# Patient Record
Sex: FEMALE | Race: WHITE | Employment: PART TIME | URBAN - METROPOLITAN AREA
[De-identification: names, ages, dates, MRNs, and addresses within clinical notes are randomized per-mention and may not be internally consistent; named-entity substitution may affect disease eponyms.]

---

## 2017-11-30 ENCOUNTER — OFFICE VISIT (OUTPATIENT)
Dept: INTERNAL MEDICINE CLINIC | Age: 22
End: 2017-11-30

## 2017-11-30 VITALS
OXYGEN SATURATION: 97 % | RESPIRATION RATE: 16 BRPM | HEART RATE: 65 BPM | WEIGHT: 160.7 LBS | BODY MASS INDEX: 30.34 KG/M2 | DIASTOLIC BLOOD PRESSURE: 69 MMHG | HEIGHT: 61 IN | TEMPERATURE: 97.7 F | SYSTOLIC BLOOD PRESSURE: 104 MMHG

## 2017-11-30 DIAGNOSIS — Z13.0 SCREENING, ANEMIA, DEFICIENCY, IRON: ICD-10-CM

## 2017-11-30 DIAGNOSIS — Z30.49 ENCOUNTER FOR INITIAL MANAGEMENT OF NUVARING: ICD-10-CM

## 2017-11-30 DIAGNOSIS — Z12.4 SCREENING FOR CERVICAL CANCER: ICD-10-CM

## 2017-11-30 DIAGNOSIS — Z23 ENCOUNTER FOR IMMUNIZATION: ICD-10-CM

## 2017-11-30 DIAGNOSIS — Z11.3 SCREEN FOR STD (SEXUALLY TRANSMITTED DISEASE): ICD-10-CM

## 2017-11-30 DIAGNOSIS — Z13.29 SCREENING FOR THYROID DISORDER: ICD-10-CM

## 2017-11-30 DIAGNOSIS — Z01.419 GYNECOLOGIC EXAM NORMAL: ICD-10-CM

## 2017-11-30 DIAGNOSIS — Z13.1 SCREENING FOR DIABETES MELLITUS: ICD-10-CM

## 2017-11-30 DIAGNOSIS — Z01.419 WELL WOMAN EXAM: Primary | ICD-10-CM

## 2017-11-30 DIAGNOSIS — L70.0 ACNE VULGARIS: ICD-10-CM

## 2017-11-30 DIAGNOSIS — Z98.82 HX OF BREAST IMPLANTS, BILATERAL: ICD-10-CM

## 2017-11-30 DIAGNOSIS — Z13.220 SCREENING FOR HYPERLIPIDEMIA: ICD-10-CM

## 2017-11-30 LAB
HCG URINE, QL. (POC): NEGATIVE
VALID INTERNAL CONTROL?: YES

## 2017-11-30 RX ORDER — ETONOGESTREL AND ETHINYL ESTRADIOL 11.7; 2.7 MG/1; MG/1
INSERT, EXTENDED RELEASE VAGINAL
Qty: 9 DEVICE | Refills: 4 | Status: SHIPPED | OUTPATIENT
Start: 2017-11-30 | End: 2017-12-04 | Stop reason: ALTCHOICE

## 2017-11-30 NOTE — MR AVS SNAPSHOT
Visit Information Date & Time Provider Department Dept. Phone Encounter #  
 11/30/2017  1:45 PM Searcy Koyanagi, MD [facility] Medicine and [place] [address] [id number] Follow-up Instructions Return in about 3 months (around 2/28/2018) for weight check,. Follow-up and Disposition History Upcoming Health Maintenance Date Due  
 HPV AGE 9Y-34Y (1 of 3 - Female 3 Dose Series) 10/11/2005 PAP AKA CERVICAL CYTOLOGY 10/11/2015 Influenza Age 5 to Adult 8/1/2017 DTaP/Tdap/Td series (2 - Td) 1/29/2026 Allergies as of 11/30/2017  Review Complete On: 11/30/2017 By: Dustin Kuo No Known Allergies Current Immunizations  Reviewed on 6/9/2016 Name Date Influenza Vaccine (Quad) PF 11/30/2017 Tdap 1/29/2016 Not reviewed this visit You Were Diagnosed With   
  
 Codes Comments Well woman exam    -  Primary ICD-10-CM: B79.000 ICD-9-CM: V72.31 Encounter for immunization     ICD-10-CM: V22 ICD-9-CM: V03.89 Gynecologic exam normal     ICD-10-CM: R54.181 ICD-9-CM: V72.31 Hx of breast implants, bilateral     ICD-10-CM: H87.38 
ICD-9-CM: V43.82 Screening, anemia, deficiency, iron     ICD-10-CM: Z13.0 ICD-9-CM: V78.0 Screening for diabetes mellitus     ICD-10-CM: Z13.1 ICD-9-CM: V77.1 Screening for thyroid disorder     ICD-10-CM: Z13.29 ICD-9-CM: V77.0 Screening for hyperlipidemia     ICD-10-CM: Z13.220 ICD-9-CM: V77.91 Encounter for initial management of nuvaring     ICD-10-CM: Z30.49 ICD-9-CM: V25.02 Acne vulgaris     ICD-10-CM: L70.0 ICD-9-CM: 706.1 Vitals BP Pulse Temp Resp Height(growth percentile) Weight(growth percentile) 104/69 (BP 1 Location: Left arm, BP Patient Position: Sitting) 65 97.7 °F (36.5 °C) (Oral) 16 5' 1\" (1.549 m) 160 lb 11.2 oz (72.9 kg) LMP SpO2 BMI OB Status Smoking Status 11/10/2017 (Approximate) 97% 30.36 kg/m2 Having regular periods Never Smoker BMI and BSA Data Body Mass Index Body Surface Area  
 30.36 kg/m 2 1.77 m 2 Preferred Pharmacy Pharmacy Name Phone [id number] [place] - [address] [phone] Your Updated Medication List  
  
   
This list is accurate as of: 11/30/17  2:35 PM.  Always use your most recent med list.  
  
  
  
  
 ethinyl estradiol-etonogestrel 0.12-0.015 mg/24 hr vaginal ring Commonly known as:  Aaron Danjuan Per instructions Prescriptions Sent to Pharmacy Refills  
 ethinyl estradiol-etonogestrel (NUVARING) 0.12-0.015 mg/24 hr vaginal ring 4 Sig: Per instructions Class: Normal  
 Pharmacy: [facility] [address] [facility] [address]  #: [phone] We Performed the Following AMB POC URINE PREGNANCY TEST, VISUAL COLOR COMPARISON [49484 CPT(R)] CBC WITH AUTOMATED DIFF [04413 CPT(R)] INFLUENZA VIRUS VAC QUAD,SPLIT,PRESV FREE SYRINGE IM [id number] CPT(R)] LIPID PANEL [92919 CPT(R)] METABOLIC PANEL, COMPREHENSIVE [01607 CPT(R)] MO COLLECTION VENOUS BLOOD,VENIPUNCTURE [id number] CPT(R)] MO IMMUNIZ ADMIN,1 SINGLE/COMB VAC/TOXOID [id number] CPT(R)] REFERRAL TO DERMATOLOGY [REF19 Custom] TSH 3RD GENERATION [87155 CPT(R)] VITAMIN D, 25 HYDROXY [id number] CPT(R)] Follow-up Instructions Return in about 3 months (around 2/28/2018) for weight check,. Referral Information Referral ID Referred By Referred To  
  
 [id number] [address] [facility], [place] [address] Visits Status Start Date End Date 1 New Request 11/30/17 11/30/18 If your referral has a status of pending review or denied, additional information will be sent to support the outcome of this decision. Introducing [facility] & HEALTH SERVICES!    
 Kimi Bean introduces [employer] patient portal. Now you can access parts of your medical record, email your doctor's office, and request medication refills online. 1. In your internet browser, go to [email or web address] pte 2. Click on the First Time User? Click Here link in the Sign In box. You will see the New Member Sign Up page. 3. Enter your [employer] Access Code exactly as it appears below. You will not need to use this code after youve completed the sign-up process. If you do not sign up before the expiration date, you must request a new code. · [employer] Access Code: [email or web address] Expires: 2/28/2018  2:35 PM 
 
4. Enter the last four digits of your Social Security Number (xxxx) and Date of Birth (mm/dd/yyyy) as indicated and click Submit. You will be taken to the next sign-up page. 5. Create a [employer] ID. This will be your [employer] login ID and cannot be changed, so think of one that is secure and easy to remember. 6. Create a [employer] password. You can change your password at any time. 7. Enter your Password Reset Question and Answer. This can be used at a later time if you forget your password. 8. Enter your e-mail address. You will receive e-mail notification when new information is available in [address] 9. Click Sign Up. You can now view and download portions of your medical record. 10. Click the Download Summary menu link to download a portable copy of your medical information. If you have questions, please visit the Frequently Asked Questions section of the [employer] website. Remember, [employer] is NOT to be used for urgent needs. For medical emergencies, dial 911. Now available from your iPhone and Android! Please provide this summary of care documentation to your next provider. Your primary care clinician is listed as Tylor Mendez. If you have any questions after today's visit, please call [phone].

## 2017-11-30 NOTE — PROGRESS NOTES
SUBJECTIVE:   20 y.o. female for annual routine Pap and checkup. Current Outpatient Prescriptions   Medication Sig Dispense Refill    ethinyl estradiol-etonogestrel (NUVARING) 0.12-0.015 mg/24 hr vaginal ring Per instructions 9 Device 4     Allergies: Review of patient's allergies indicates no known allergies. Patient's last menstrual period was 11/10/2017 (approximate). ROS:  Feeling well. No dyspnea or chest pain on exertion. No abdominal pain, change in bowel habits, black or bloody stools. No urinary tract symptoms. GYN ROS: normal menses, no abnormal bleeding, pelvic pain or discharge, no breast pain or new or enlarging lumps on self exam. No neurological complaints. OBJECTIVE:   The patient appears well, alert, oriented x 3, in no distress. Visit Vitals    /69 (BP 1 Location: Left arm, BP Patient Position: Sitting)    Pulse 65    Temp 97.7 °F (36.5 °C) (Oral)    Resp 16    Ht 5' 1\" (1.549 m)    Wt 160 lb 11.2 oz (72.9 kg)    LMP 11/10/2017 (Approximate)    SpO2 97%    BMI 30.36 kg/m2     ENT normal.  Neck supple. No adenopathy or thyromegaly. JAMEY. Lungs are clear, good air entry, no wheezes, rhonchi or rales. S1 and S2 normal, no murmurs, regular rate and rhythm. Abdomen soft without tenderness, guarding, mass or organomegaly. Extremities show no edema, normal peripheral pulses. Neurological is normal, no focal findings. BREAST EXAM: breasts appear normal, no suspicious masses, no skin or nipple changes or axillary nodes    PELVIC EXAM: normal external genitalia, vulva, vagina, cervix, uterus and adnexa    ASSESSMENT:   Normal exam  Overweight, on plan, hx of bulemia. Better body image now. well woman  no contraindication to begin hormonal therapy    PLAN:   pap smear  counseled on breast self exam, use and side effects of OCP's and family planning choices  return annually or prn    ICD-10-CM ICD-9-CM    1.  Well woman exam Z01.419 V72.31 ethinyl estradiol-etonogestrel (NUVARING) 0.12-0.015 mg/24 hr vaginal ring      CBC WITH AUTOMATED DIFF      LIPID PANEL      METABOLIC PANEL, COMPREHENSIVE      RI COLLECTION VENOUS BLOOD,VENIPUNCTURE      TSH 3RD GENERATION      VITAMIN D, 25 HYDROXY   2. Encounter for immunization Z23 V03.89 RI IMMUNIZ ADMIN,1 SINGLE/COMB VAC/TOXOID      INFLUENZA VIRUS VAC QUAD,SPLIT,PRESV FREE SYRINGE IM   3. Gynecologic exam normal Z01.419 V72.31    4. Hx of breast implants, bilateral Z98.82 V43.82    5. Screening, anemia, deficiency, iron Z13.0 V78.0 CBC WITH AUTOMATED DIFF   6. Screening for diabetes mellitus Z13.1 V77.1 LIPID PANEL   7. Screening for thyroid disorder Z13.29 V77.0 TSH 3RD GENERATION   8. Screening for hyperlipidemia Z13.220 V77.91 LIPID PANEL   9.  Encounter for initial management of nuvaring Z30.49 V25.02 ethinyl estradiol-etonogestrel (NUVARING) 0.12-0.015 mg/24 hr vaginal ring     Follow-up Disposition: Not on Marine MD Mark

## 2017-11-30 NOTE — PROGRESS NOTES
Chief Complaint   Patient presents with    Well Woman     rm 4 with pap and breast exam, patient would like birth control and acne medication      1. Have you been to the ER, urgent care clinic since your last visit? Hospitalized since your last visit? No    2. Have you seen or consulted any other health care providers outside of the [address] [place] [address] since your last visit? Include any pap smears or colon screening.  No

## 2017-12-01 LAB
25(OH)D3+25(OH)D2 SERPL-MCNC: 42.3 NG/ML (ref 30–100)
ALBUMIN SERPL-MCNC: 4.1 G/DL (ref 3.5–5.5)
ALBUMIN/GLOB SERPL: 1.6 {RATIO} (ref 1.2–2.2)
ALP SERPL-CCNC: 56 IU/L (ref 39–117)
ALT SERPL-CCNC: 12 IU/L (ref 0–32)
AST SERPL-CCNC: 17 IU/L (ref 0–40)
BASOPHILS # BLD AUTO: 0 X10E3/UL (ref 0–0.2)
BASOPHILS NFR BLD AUTO: 0 %
BILIRUB SERPL-MCNC: 0.4 MG/DL (ref 0–1.2)
BUN SERPL-MCNC: 14 MG/DL (ref 6–20)
BUN/CREAT SERPL: 17 (ref 9–23)
CALCIUM SERPL-MCNC: 9 MG/DL (ref 8.7–10.2)
CHLORIDE SERPL-SCNC: 102 MMOL/L (ref 96–106)
CHOLEST SERPL-MCNC: 117 MG/DL (ref 100–199)
CO2 SERPL-SCNC: 27 MMOL/L (ref 18–29)
CREAT SERPL-MCNC: 0.81 MG/DL (ref 0.57–1)
EOSINOPHIL # BLD AUTO: 0.1 X10E3/UL (ref 0–0.4)
EOSINOPHIL NFR BLD AUTO: 1 %
ERYTHROCYTE [DISTWIDTH] IN BLOOD BY AUTOMATED COUNT: 13 % (ref 12.3–15.4)
GFR SERPLBLD CREATININE-BSD FMLA CKD-EPI: 103 ML/MIN/1.73
GFR SERPLBLD CREATININE-BSD FMLA CKD-EPI: 118 ML/MIN/1.73
GLOBULIN SER CALC-MCNC: 2.6 G/DL (ref 1.5–4.5)
GLUCOSE SERPL-MCNC: 82 MG/DL (ref 65–99)
HCT VFR BLD AUTO: 39.2 % (ref 34–46.6)
HDLC SERPL-MCNC: 55 MG/DL
HGB BLD-MCNC: 12.9 G/DL (ref 11.1–15.9)
IMM GRANULOCYTES # BLD: 0 X10E3/UL (ref 0–0.1)
IMM GRANULOCYTES NFR BLD: 0 %
LDLC SERPL CALC-MCNC: 46 MG/DL (ref 0–99)
LYMPHOCYTES # BLD AUTO: 2.3 X10E3/UL (ref 0.7–3.1)
LYMPHOCYTES NFR BLD AUTO: 29 %
MCH RBC QN AUTO: 29.3 PG (ref 26.6–33)
MCHC RBC AUTO-ENTMCNC: 32.9 G/DL (ref 31.5–35.7)
MCV RBC AUTO: 89 FL (ref 79–97)
MONOCYTES # BLD AUTO: 0.6 X10E3/UL (ref 0.1–0.9)
MONOCYTES NFR BLD AUTO: 7 %
NEUTROPHILS # BLD AUTO: 5.1 X10E3/UL (ref 1.4–7)
NEUTROPHILS NFR BLD AUTO: 63 %
PLATELET # BLD AUTO: 272 X10E3/UL (ref 150–379)
POTASSIUM SERPL-SCNC: 4.1 MMOL/L (ref 3.5–5.2)
PROT SERPL-MCNC: 6.7 G/DL (ref 6–8.5)
RBC # BLD AUTO: 4.41 X10E6/UL (ref 3.77–5.28)
SODIUM SERPL-SCNC: 142 MMOL/L (ref 134–144)
TRIGL SERPL-MCNC: 81 MG/DL (ref 0–149)
TSH SERPL DL<=0.005 MIU/L-ACNC: 0.86 UIU/ML (ref 0.45–4.5)
VLDLC SERPL CALC-MCNC: 16 MG/DL (ref 5–40)
WBC # BLD AUTO: 8.1 X10E3/UL (ref 3.4–10.8)

## 2017-12-04 ENCOUNTER — OFFICE VISIT (OUTPATIENT)
Dept: INTERNAL MEDICINE CLINIC | Age: 22
End: 2017-12-04

## 2017-12-04 VITALS
RESPIRATION RATE: 18 BRPM | DIASTOLIC BLOOD PRESSURE: 78 MMHG | TEMPERATURE: 97.8 F | SYSTOLIC BLOOD PRESSURE: 114 MMHG | WEIGHT: 161.1 LBS | HEART RATE: 86 BPM | HEIGHT: 61 IN | BODY MASS INDEX: 30.41 KG/M2 | OXYGEN SATURATION: 98 %

## 2017-12-04 DIAGNOSIS — R14.0 BLOATING SYMPTOM: Primary | ICD-10-CM

## 2017-12-04 DIAGNOSIS — Z91.018 FOOD ALLERGY: ICD-10-CM

## 2017-12-04 DIAGNOSIS — Z30.09 FAMILY PLANNING INITIATION: ICD-10-CM

## 2017-12-04 NOTE — PROGRESS NOTES
Chief Complaint   Patient presents with    Allergies     rm 5 patient would like to have allergy testing specifically gluten, has been working out and has not seen any change      1. Have you been to the ER, urgent care clinic since your last visit? Hospitalized since your last visit? No    2. Have you seen or consulted any other health care providers outside of the [address] [place] [address] since your last visit? Include any pap smears or colon screening.  No

## 2017-12-04 NOTE — PROGRESS NOTES
Ms. Otoniel Bright is a 20y.o. year old female who had concerns including Allergies. HPI:  Chief Complaint   Patient presents with    Allergies     rm 5 patient would like to have allergy testing specifically gluten, has been working out and has not seen any change       Trouble with weight. Feels bloated and constipated, uncomfortable eating. nuvaring was $72 instead of free. Past Medical History:   Diagnosis Date    Anxiety     Borderline personality disorder     Degenerative disc disease at L5-S1 level     Depression      Current Outpatient Prescriptions   Medication Sig Dispense    [START ON 12/9/2017] norelgestromin-ethinyl estradiol (ORTHO EVRA) 150-35 mcg/24 hr 1 Patch by TransDERmal route Every Saturday. 12 Patch     No current facility-administered medications for this visit. Reviewed PmHx, RxHx, FmHx, SocHx, AllgHx and updated and dated in the chart. ROS: Negative except for BOLD  General: fever, chills, fatigue  Respiratory: cough, SOB, wheezing  Cardiovascular:  CP, palpitation, SAHU, edema   Gastrointestinal: N/V/D, bleeding  Genito-Urinary: dysuria, hematuria  Musculoskeletal: muscle weakness, pain, swelling    OBJECTIVE:   Visit Vitals    /78 (BP 1 Location: Right arm, BP Patient Position: Sitting)    Pulse 86    Temp 97.8 °F (36.6 °C) (Oral)    Resp 18    Ht 5' 1\" (1.549 m)    Wt 161 lb 1.6 oz (73.1 kg)    LMP 11/10/2017 (Approximate)    SpO2 98%    BMI 30.44 kg/m2     GEN: The patient appears well, alert, oriented x 3, in no distress. Lungs: clear bilaterally, good air entry, no wheezes, rhonchi or rales. Cardiovascular: regular rate and rhythm. S1 and S2 normal, no murmurs,  Abdomen: + BS, soft without tenderness, guarding, rebound, mass or organomegaly.        Results for orders placed or performed in visit on 11/30/17   CBC WITH AUTOMATED DIFF   Result Value Ref Range    WBC 8.1 3.4 - 10.8 x10E3/uL    RBC 4.41 3.77 - 5.28 x10E6/uL    HGB 12.9 11.1 - 15.9 g/dL    HCT 39.2 34.0 - 46.6 %    MCV 89 79 - 97 fL    MCH 29.3 26.6 - 33.0 pg    MCHC 32.9 31.5 - 35.7 g/dL    RDW 13.0 12.3 - 15.4 %    PLATELET 887 250 - 794 x10E3/uL    NEUTROPHILS 63 Not Estab. %    Lymphocytes 29 Not Estab. %    MONOCYTES 7 Not Estab. %    EOSINOPHILS 1 Not Estab. %    BASOPHILS 0 Not Estab. %    ABS. NEUTROPHILS 5.1 1.4 - 7.0 x10E3/uL    Abs Lymphocytes 2.3 0.7 - 3.1 x10E3/uL    ABS. MONOCYTES 0.6 0.1 - 0.9 x10E3/uL    ABS. EOSINOPHILS 0.1 0.0 - 0.4 x10E3/uL    ABS. BASOPHILS 0.0 0.0 - 0.2 x10E3/uL    IMMATURE GRANULOCYTES 0 Not Estab. %    ABS. IMM. GRANS. 0.0 0.0 - 0.1 x10E3/uL   LIPID PANEL   Result Value Ref Range    Cholesterol, total 117 100 - 199 mg/dL    Triglyceride 81 0 - 149 mg/dL    HDL Cholesterol 55 >39 mg/dL    VLDL, calculated 16 5 - 40 mg/dL    LDL, calculated 46 0 - 99 mg/dL   METABOLIC PANEL, COMPREHENSIVE   Result Value Ref Range    Glucose 82 65 - 99 mg/dL    BUN 14 6 - 20 mg/dL    Creatinine 0.81 0.57 - 1.00 mg/dL    GFR est non- >59 mL/min/1.73    GFR est  >59 mL/min/1.73    BUN/Creatinine ratio 17 9 - 23    Sodium 142 134 - 144 mmol/L    Potassium 4.1 3.5 - 5.2 mmol/L    Chloride 102 96 - 106 mmol/L    CO2 27 18 - 29 mmol/L    Calcium 9.0 8.7 - 10.2 mg/dL    Protein, total 6.7 6.0 - 8.5 g/dL    Albumin 4.1 3.5 - 5.5 g/dL    GLOBULIN, TOTAL 2.6 1.5 - 4.5 g/dL    A-G Ratio 1.6 1.2 - 2.2    Bilirubin, total 0.4 0.0 - 1.2 mg/dL    Alk.  phosphatase 56 39 - 117 IU/L    AST (SGOT) 17 0 - 40 IU/L    ALT (SGPT) 12 0 - 32 IU/L   TSH 3RD GENERATION   Result Value Ref Range    TSH 0.864 0.450 - 4.500 uIU/mL   VITAMIN D, 25 HYDROXY   Result Value Ref Range    VITAMIN D, 25-HYDROXY 42.3 30.0 - 100.0 ng/mL   AMB POC URINE PREGNANCY TEST, VISUAL COLOR COMPARISON   Result Value Ref Range    VALID INTERNAL CONTROL POC Yes     HCG urine, Ql. (POC) Negative Negative         Assessment/ Plan:       ICD-10-CM ICD-9-CM    1. Bloating symptom R14.0 787.3 ALLERGEN PANEL, FOOD, BASIC      ALLERGEN, GLUTEN AB, IGE, QT   2. Food allergy Z91.018 V15.05 ALLERGEN PANEL, FOOD, BASIC      ALLERGEN, GLUTEN AB, IGE, QT   3. Family planning initiation Z30.09 V25.09 norelgestromin-ethinyl estradiol (ORTHO EVRA) 150-35 mcg/24 hr       Screen for food allergies    I have discussed the diagnosis with the patient and the intended plan as seen in the above orders. The patient has received an after-visit summary and questions were answered concerning future plans. Medication Side Effects and Warnings were discussed with patient.     Follow-up Disposition: Not on R Radha Eisenberg MD

## 2017-12-04 NOTE — MR AVS SNAPSHOT
Visit Information Date & Time Provider Department Dept. Phone Encounter #  
 12/4/2017  3:00 PM Rita Cabello MD [facility] Medicine and [place] [address] [id number] Your Appointments 3/5/2018  1:30 PM  
Any with Rita Cabello MD  
[address] [place] [address] and Primary Care [facility]) Appt Note: 3 month f/u  
 [address] [facility]  
  
   
 [phone] Upcoming Health Maintenance Date Due  
 HPV AGE 9Y-34Y (1 of 3 - Female 3 Dose Series) 10/11/2005 PAP AKA CERVICAL CYTOLOGY 3/4/2018* DTaP/Tdap/Td series (2 - Td) 1/29/2026 *Topic was postponed. The date shown is not the original due date. Allergies as of 12/4/2017  Review Complete On: 12/4/2017 By: Rita Cabello MD  
 No Known Allergies Current Immunizations  Reviewed on 6/9/2016 Name Date Influenza Vaccine (Quad) PF 11/30/2017 Tdap 1/29/2016 Not reviewed this visit You Were Diagnosed With   
  
 Codes Comments Bloating symptom    -  Primary ICD-10-CM: R14.0 ICD-9-CM: 806. 3 Food allergy     ICD-10-CM: U70.810 
ICD-9-CM: V15.05 Family planning initiation     ICD-10-CM: Z30.09 
ICD-9-CM: V25.09 Vitals BP Pulse Temp Resp Height(growth percentile) Weight(growth percentile) 114/78 (BP 1 Location: Right arm, BP Patient Position: Sitting) 86 97.8 °F (36.6 °C) (Oral) 18 5' 1\" (1.549 m) 161 lb 1.6 oz (73.1 kg) LMP SpO2 BMI OB Status Smoking Status 11/10/2017 (Approximate) 98% 30.44 kg/m2 Having regular periods Never Smoker BMI and BSA Data Body Mass Index Body Surface Area  
 30.44 kg/m 2 1.77 m 2 Preferred Pharmacy Pharmacy Name Phone [address] [facility] [phone] Your Updated Medication List  
  
   
 This list is accurate as of: 17  4:13 PM.  Always use your most recent med list.  
  
  
  
  
 norelgestromin-ethinyl estradiol 150-35 mcg/24 hr  
Commonly known as:  ORTHO EVRA  
1 Patch by TransDERmal route Every Saturday. Start taking on:  2017 Prescriptions Sent to Pharmacy Refills  
 norelgestromin-ethinyl estradiol (ORTHO EVRA) 150-35 mcg/24 hr 4 Starting on: 2017 Si Patch by TransDERmal route Every Saturday. Class: Normal  
 Pharmacy: [address] [place] [address] RD AT [address] [facility] #: [phone] Route: TransDERmal  
  
We Performed the Following ALLERGEN PANEL, FOOD, BASIC [id number] CPT(R)] ALLERGEN, GLUTEN AB, IGE, QT [[id number] Custom] Patient Instructions Learning About Birth Control: The Patch What is the patch? The patch is used to prevent pregnancy. It looks like a bandage and is put on the skin of your belly, rear end (buttocks), upper arm, or upper body (but not on a breast). The patch releases a regular dose of the hormones estrogen and progestin. These hormones prevent pregnancy in three ways. They thicken the mucus in the cervix. This makes it hard for sperm to travel into the uterus. They thin the lining of the uterus, which makes it harder for a fertilized egg to attach to the uterus. The hormones also can stop the ovaries from releasing an egg each month (ovulation). The patch provides birth control for 1 month at a time. You change the patch once a week for 3 weeks and then go without a patch for 1 week. During this week, you have your period. Your period may be very light. How well does it work? In the first year of use: · When the patch is used exactly as directed, fewer than 1 woman out of 100 has an unplanned pregnancy. · When the patch is not used exactly as directed, 9 women out of 100 have an unplanned pregnancy. Be sure to tell your doctor about any health problems you have or medicines you take. He or she can help you choose the birth control method that is right for you. What are the advantages of the patch? · The patch is more effective for preventing pregnancy than barrier methods of birth control, such as the condom or diaphragm. · It may reduce acne, heavy bleeding and cramping, and symptoms of premenstrual syndrome. · It's convenient. You put it on only 3 times each month. You do not have to interrupt sex to protect against pregnancy. · It's easy to check to see if you forgot to put one on. What are the disadvantages of the patch? · The patch doesn't protect against sexually transmitted infections (STIs), such as herpes or HIV/AIDS. If you aren't sure if your sex partner might have an STI, use a condom to protect against disease. · The patch may cause changes in your period. You may have little bleeding, skipped periods, or spotting. · It may cause mood changes, less interest in sex, or weight gain. · The patch contains estrogen. It may not be right for you if you have certain health problems or concerns. · It may increase your risk of blood clots. · It may be less effective in women who are overweight. · You must remember to change the patch on schedule. Where can you learn more? Go to http://maureen-diana.info/. Enter C094 in the search box to learn more about \"Learning About Birth Control: The Patch. \" 
Current as of: March 16, 2017 Content Version: 11.4 © 8809-5079 Healthwise, Incorporated. Care instructions adapted under license by [employer] (which disclaims liability or warranty for this information). If you have questions about a medical condition or this instruction, always ask your healthcare professional. [address] any warranty or liability for your use of this information. Introducing hospitals & HEALTH SERVICES! [employer] introduces [employer] patient portal. Now you can access parts of your medical record, email your doctor's office, and request medication refills online. 1. In your internet browser, go to [email or web address] 2. Click on the First Time User? Click Here link in the Sign In box. You will see the New Member Sign Up page. 3. Enter your [employer] Access Code exactly as it appears below. You will not need to use this code after youve completed the sign-up process. If you do not sign up before the expiration date, you must request a new code. · [employer] Access Code: [email or web address] Expires: 2/28/2018  2:35 PM 
 
4. Enter the last four digits of your Social Security Number (xxxx) and Date of Birth (mm/dd/yyyy) as indicated and click Submit. You will be taken to the next sign-up page. 5. Create a [employer] ID. This will be your [employer] login ID and cannot be changed, so think of one that is secure and easy to remember. 6. Create a [employer] password. You can change your password at any time. 7. Enter your Password Reset Question and Answer. This can be used at a later time if you forget your password. 8. Enter your e-mail address. You will receive e-mail notification when new information is available in [address] 9. Click Sign Up. You can now view and download portions of your medical record. 10. Click the Download Summary menu link to download a portable copy of your medical information. If you have questions, please visit the Frequently Asked Questions section of the [employer] website. Remember, [employer] is NOT to be used for urgent needs. For medical emergencies, dial 911. Now available from your iPhone and Android! Please provide this summary of care documentation to your next provider. Your primary care clinician is listed as Michelle Soni. If you have any questions after today's visit, please call [phone].

## 2017-12-04 NOTE — PATIENT INSTRUCTIONS
Learning About Birth Control: The Patch  What is the patch? The patch is used to prevent pregnancy. It looks like a bandage and is put on the skin of your belly, rear end (buttocks), upper arm, or upper body (but not on a breast). The patch releases a regular dose of the hormones estrogen and progestin. These hormones prevent pregnancy in three ways. They thicken the mucus in the cervix. This makes it hard for sperm to travel into the uterus. They thin the lining of the uterus, which makes it harder for a fertilized egg to attach to the uterus. The hormones also can stop the ovaries from releasing an egg each month (ovulation). The patch provides birth control for 1 month at a time. You change the patch once a week for 3 weeks and then go without a patch for 1 week. During this week, you have your period. Your period may be very light. How well does it work? In the first year of use:  · When the patch is used exactly as directed, fewer than 1 woman out of 100 has an unplanned pregnancy. · When the patch is not used exactly as directed, 9 women out of 100 have an unplanned pregnancy. Be sure to tell your doctor about any health problems you have or medicines you take. He or she can help you choose the birth control method that is right for you. What are the advantages of the patch? · The patch is more effective for preventing pregnancy than barrier methods of birth control, such as the condom or diaphragm. · It may reduce acne, heavy bleeding and cramping, and symptoms of premenstrual syndrome. · It's convenient. You put it on only 3 times each month. You do not have to interrupt sex to protect against pregnancy. · It's easy to check to see if you forgot to put one on. What are the disadvantages of the patch? · The patch doesn't protect against sexually transmitted infections (STIs), such as herpes or HIV/AIDS.  If you aren't sure if your sex partner might have an STI, use a condom to protect against disease. · The patch may cause changes in your period. You may have little bleeding, skipped periods, or spotting. · It may cause mood changes, less interest in sex, or weight gain. · The patch contains estrogen. It may not be right for you if you have certain health problems or concerns. · It may increase your risk of blood clots. · It may be less effective in women who are overweight. · You must remember to change the patch on schedule. Where can you learn more? Go to http://maureen-diana.info/. Enter W982 in the search box to learn more about \"Learning About Birth Control: The Patch. \"  Current as of: March 16, 2017  Content Version: 11.4  © 4793-9242 [employer]. Care instructions adapted under license by [employer] (which disclaims liability or warranty for this information). If you have questions about a medical condition or this instruction, always ask your healthcare professional. [place] [address] any warranty or liability for your use of this information.

## 2017-12-05 LAB
A VAGINAE DNA VAG QL NAA+PROBE: NORMAL SCORE
BVAB2 DNA VAG QL NAA+PROBE: NORMAL SCORE
C ALBICANS DNA VAG QL NAA+PROBE: NEGATIVE
C GLABRATA DNA VAG QL NAA+PROBE: NEGATIVE
C TRACH RRNA SPEC QL NAA+PROBE: NEGATIVE
MEGA1 DNA VAG QL NAA+PROBE: NORMAL SCORE
N GONORRHOEA RRNA SPEC QL NAA+PROBE: NEGATIVE
T VAGINALIS RRNA SPEC QL NAA+PROBE: NEGATIVE

## 2017-12-07 LAB
CODFISH IGE QN: <0.1 KU/L
COW MILK IGE QN: <0.1 KU/L
CYTOLOGIST CVX/VAG CYTO: NORMAL
CYTOLOGY CVX/VAG DOC THIN PREP: NORMAL
DX ICD CODE: NORMAL
EGG WHITE IGE QN: <0.1 KU/L
GLUTEN IGE QN: <0.1 KU/L
LABCORP, 190119: NORMAL
Lab: NORMAL
OTHER STN SPEC: NORMAL
PATH REPORT.FINAL DX SPEC: NORMAL
PEANUT IGE QN: <0.1 KU/L
SOYBEAN IGE QN: <0.1 KU/L
STAT OF ADQ CVX/VAG CYTO-IMP: NORMAL
WHEAT IGE QN: <0.1 KU/L

## 2018-04-24 ENCOUNTER — OFFICE VISIT (OUTPATIENT)
Dept: INTERNAL MEDICINE CLINIC | Age: 23
End: 2018-04-24

## 2018-04-24 VITALS
HEIGHT: 61 IN | TEMPERATURE: 97.9 F | WEIGHT: 164.6 LBS | HEART RATE: 86 BPM | OXYGEN SATURATION: 97 % | DIASTOLIC BLOOD PRESSURE: 70 MMHG | BODY MASS INDEX: 31.08 KG/M2 | RESPIRATION RATE: 16 BRPM | SYSTOLIC BLOOD PRESSURE: 100 MMHG

## 2018-04-24 DIAGNOSIS — E28.2 PCOS (POLYCYSTIC OVARIAN SYNDROME): Primary | ICD-10-CM

## 2018-04-24 DIAGNOSIS — F60.3 BORDERLINE PERSONALITY DISORDER (HCC): ICD-10-CM

## 2018-04-24 RX ORDER — VENLAFAXINE 25 MG/1
TABLET ORAL
COMMUNITY
End: 2018-04-24

## 2018-04-24 RX ORDER — VENLAFAXINE HYDROCHLORIDE 37.5 MG/1
37.5 CAPSULE, EXTENDED RELEASE ORAL DAILY
Qty: 30 CAP | Refills: 0 | Status: SHIPPED | OUTPATIENT
Start: 2018-04-24 | End: 2019-04-29 | Stop reason: ALTCHOICE

## 2018-04-24 RX ORDER — NICOTINE POLACRILEX 2 MG
GUM BUCCAL
Status: ON HOLD | COMMUNITY
End: 2019-02-11 | Stop reason: CLARIF

## 2018-04-24 RX ORDER — METFORMIN HYDROCHLORIDE 500 MG/1
500 TABLET ORAL 2 TIMES DAILY WITH MEALS
Qty: 60 TAB | Refills: 1 | Status: ON HOLD | OUTPATIENT
Start: 2018-04-24 | End: 2019-02-11 | Stop reason: CLARIF

## 2018-04-24 NOTE — MR AVS SNAPSHOT
[address] [address] 
[phone] Patient: Jada Lui MRN: [id number] :1995 Visit Information Date & Time Provider Department Dept. Phone Encounter #  
 2018  8:00 AM Daria Johnson, [address] [facility] Internal Medicine [phone] [id number] Upcoming Health Maintenance Date Due  
 HPV Age 9Y-34Y (1 of 3 - Female 3 Dose Series) 10/11/2005 PAP AKA CERVICAL CYTOLOGY 2020 DTaP/Tdap/Td series (2 - Td) 2026 Allergies as of 2018  Review Complete On: 2018 By: Daria Johnson MD  
 No Known Allergies Current Immunizations  Reviewed on 2016 Name Date Influenza Vaccine (Quad) PF 2017 Tdap 2016 Not reviewed this visit You Were Diagnosed With   
  
 Codes Comments PCOS (polycystic ovarian syndrome)    -  Primary ICD-10-CM: E28.2 ICD-9-CM: 256.4 Borderline personality disorder     ICD-10-CM: F60.3 ICD-9-CM: 480.74 Vitals BP Pulse Temp Resp Height(growth percentile) Weight(growth percentile) 100/70 (BP 1 Location: Left arm, BP Patient Position: Sitting) 86 97.9 °F (36.6 °C) (Oral) 16 5' 1\" (1.549 m) 164 lb 9.6 oz (74.7 kg) LMP SpO2 BMI OB Status Smoking Status 04/15/2018 (Approximate) 97% 31.1 kg/m2 Having regular periods Never Smoker BMI and BSA Data Body Mass Index Body Surface Area  
 31.1 kg/m 2 1.79 m 2 Preferred Pharmacy Pharmacy Name Phone [address] [place] [address] [phone] Your Updated Medication List  
  
   
This list is accurate as of 18  9:04 AM.  Always use your most recent med list.  
  
  
  
  
 ADULT MULTIVITAMIN GUMMIES 200 mcg Chew Generic drug:  multivit with min-folic acid Take  by mouth.  
  
 biotin 1 mg Cap Take  by mouth.  
  
 metFORMIN 500 mg tablet Commonly known as:  GLUCOPHAGE  
 Take 1 Tab by mouth two (2) times daily (with meals). norelgestromin-ethinyl estradiol 150-35 mcg/24 hr  
Commonly known as:  ORTHO EVRA  
1 Patch by TransDERmal route Every Saturday. venlafaxine-SR 37.5 mg capsule Commonly known as:  EFFEXOR-XR Take 1 Cap by mouth daily. Prescriptions Sent to Pharmacy Refills  
 metFORMIN (GLUCOPHAGE) 500 mg tablet 1 Sig: Take 1 Tab by mouth two (2) times daily (with meals). Class: Normal  
 Pharmacy: [address] [place] [address], [address] [place] [address] Ph #: [phone] Route: Oral  
 venlafaxine-SR (EFFEXOR-XR) 37.5 mg capsule 0 Sig: Take 1 Cap by mouth daily. Class: Normal  
 Pharmacy: [address] [place] [address], [address] [place] [address] Ph #: [phone] Route: Oral  
  
Patient Instructions Polycystic Ovary Syndrome: Care Instructions Your Care Instructions Polycystic ovary syndrome, or PCOS, means a woman's hormones are out of balance. It can cause problems with your periods and make it hard to get pregnant. Doctors don't know for sure what causes PCOS, but it seems to run in families. It also seems to be linked to obesity and a risk for diabetes. If you have PCOS, your sisters and daughters have a higher chance of getting it too. You may have other symptoms. These include weight gain, acne, too much hair growth on the face or body, high blood pressure, and high blood sugar. Your ovaries may have cysts on them. These cysts are growths filled with fluid. Keep in mind that although you may not have regular periods, you can still get pregnant. Talk to your doctor about birth control if you do not want to get pregnant. Sometimes the hormone changes with PCOS can also make it hard for some women to get pregnant. If this is a concern, talk to your doctor about treatment for this problem. Women who have PCOS can go for months or longer with no period.  Your doctor may recommend medicines that can help get your cycles back to normal. 
Follow-up care is a key part of your treatment and safety. Be sure to make and go to all appointments, and call your doctor if you are having problems. It's also a good idea to know your test results and keep a list of the medicines you take. How can you care for yourself at home? · Take your medicines exactly as prescribed. Call your doctor if you think you are having a problem with your medicine. · Eat a healthy diet. Include fruits, vegetables, beans, and whole grains in your diet each day. · If you are overweight, losing weight can help with many of the symptoms of PCOS. Talk to your doctor about safe ways to lose weight. · Get at least 30 minutes of exercise on most days of the week. Walking is a good choice. Or you can run, swim, cycle, or play tennis or team sports. · For hair growth you don't want, try bleaching, plucking, electrolysis, or laser therapy. · Acne can be treated with over-the-counter medicines. Look for ones that have benzoyl peroxide or salicylic acid in them. When should you call for help? Call your doctor now or seek immediate medical care if: 
? · You have severe vaginal bleeding. ? · You have new or worse belly or pelvic pain. ? Watch closely for changes in your health, and be sure to contact your doctor if: 
? · You do not get better as expected. ? · You have unusual vaginal bleeding. Where can you learn more? Go to http://maureen-diana.info/. Enter R858 in the search box to learn more about \"Polycystic Ovary Syndrome: Care Instructions. \" Current as of: October 13, 2016 Content Version: 11.4 © 7059-5602 [employer]. Care instructions adapted under license by [employer] (which disclaims liability or warranty for this information).  If you have questions about a medical condition or this instruction, always ask your healthcare professional. [place] disclaims any warranty or liability for your use of this information. Introducing [facility] & HEALTH [facility]! Dear Nely Reading: Thank you for requesting a inEarth account. Our records indicate that you already have an active inEarth account. You can access your account anytime at [email or web address] Did you know that you can access your hospital and ER discharge instructions at any time in inEarth? You can also review all of your test results from your hospital stay or ER visit. Additional Information If you have questions, please visit the Frequently Asked Questions section of the inEarth website at [email or web address] Remember, inEarth is NOT to be used for urgent needs. For medical emergencies, dial 911. Now available from your iPhone and Android! Please provide this summary of care documentation to your next provider. Your primary care clinician is listed as [employer] If you have any questions after today's visit, please call [phone].

## 2018-04-24 NOTE — PROGRESS NOTES
Chief Complaint   Patient presents with    PCOS    Other     medication discussion     Patient states she is here for PCOS and would like to be started on Metformin because she is not ovulating.

## 2018-04-24 NOTE — PROGRESS NOTES
HPI:  Chris Bustamante is a 20y.o. year old female who is a new patient and is here to establish care. She was previous followed by Dr Anita King and most recently had lab work and evaluation at another clinic and will request records. The following sections were reviewed & updated as appropriate: PMH, PL, PSH, and SH. Will request records. Active medical concerns are as follows: She is on BCP and last menses 4/15/18. He states menses are regular on her birth control pill. She is requesting metformin for PCOS. She states recent lab testing showed elevated testosterone and she has acne and excess facial hair growth. She states she waxes her face to remove the facial hair. Her DHEA and 17 hydroxy were normal and testosterone was elevated she is also concerned about her weight and would like metformin to help her with weight loss. On review of recent lab work. She is working out 5x a week and is eating a healthy diet and has not felt able to lose wt. She has been on oral contraceptive since age 14 yr. She states she also has a borderline personality disorder. She just restarted her effexor xr that she had left over from when she was taking it previously a year ago. She had stopped it a year ago. She recently lost her job and felt she needed the Effexor XR but denies depression or anxiety this time. She is seeing a psychologist at [facility]. She also notes Hx of ovarian cysts as a teenager. Current Outpatient Prescriptions   Medication Sig Dispense Refill    venlafaxine (EFFEXOR) 25 mg tablet Take  by mouth.  multivit with min-folic acid (ADULT MULTIVITAMIN GUMMIES) 200 mcg chew Take  by mouth.  biotin 1 mg cap Take  by mouth.        No Known Allergies  Past Medical History:   Diagnosis Date    Anxiety     Borderline personality disorder     Degenerative disc disease at L5-S1 level     Depression      Past Surgical History:   Procedure Laterality Date    HX BREAST AUGMENTATION       Family History   Problem Relation Age of Onset    Cancer Mother      skin,thyroid,ovarian   [employer] Mother     Depression Mother     Anxiety Mother     Bipolar Disorder Mother     Other Father      prediabetes    No Known Problems Sister     Other Brother      jayne     Social History   Substance Use Topics    Smoking status: Never Smoker    Smokeless tobacco: Never Used    Alcohol use No      Comment: socially             Review of Systems   Constitutional: Negative for chills, fever and malaise/fatigue. HENT: Negative for congestion and sore throat. Respiratory: Negative for shortness of breath. Cardiovascular: Negative for chest pain and leg swelling. Gastrointestinal: Negative for abdominal pain. Physical Exam   Constitutional: She appears well-developed. No distress. /70 (BP 1 Location: Left arm, BP Patient Position: Sitting)  Pulse 86  Temp 97.9 °F (36.6 °C) (Oral)   Resp 16  Ht 5' 1\" (1.549 m)  Wt 164 lb 9.6 oz (74.7 kg)  LMP 04/15/2018 (Approximate)  SpO2 97%  BMI 31.1 kg/m2   HENT:   Head: Normocephalic and atraumatic. Nose: Nose normal.   Eyes: Conjunctivae and EOM are normal. Pupils are equal, round, and reactive to light. Neck: No thyromegaly present. Cardiovascular: Normal rate, regular rhythm, normal heart sounds and intact distal pulses. No murmur heard. Pulmonary/Chest: Effort normal and breath sounds normal. She has no wheezes. Abdominal: Soft. Bowel sounds are normal. She exhibits no distension and no mass. There is no tenderness. Musculoskeletal: She exhibits no edema. Skin:   Acne on face. No significant facial hair today   Psychiatric: She has a normal mood and affect. Vitals reviewed. Assessment & Plan:    ICD-10-CM ICD-9-CM    1. PCOS (polycystic ovarian syndrome)  After reviewing lab work and history it is likely that she has PCO S. Will try metformin 500 mg twice daily which may also help with her obesity.    E28.2 256.4    2. Borderline personality disorder  She will continue seeing her counselor. Will restart a low-dose of Effexor XR 37.5 mg daily as she feels this is helped her personality disorder in the past and she states she is unable to see psychiatry due to loss of her insurance. She is counseled regarding side effects of medication and not to discontinue it abruptly and risk of withdrawal side effects. She is counseled if she develops any depression or suicidal thoughts to contact me or go to the emergency room immediately. F60.3 301.83    3. Obesity:     Counseled regarding elevated BMI and current weight goals. Reviewed weight loss strategies including dietary changes and exercise. Follow-up Disposition:  Return in about 4 weeks (around 5/22/2018). Advised her to call back or return to office if symptoms worsen/change/persist.  Discussed expected course/resolution/complications of diagnosis in detail with patient. Medication risks/benefits/costs/interactions/alternatives discussed with patient. She was given an after visit summary which includes diagnoses, current medications, & vitals. She expressed understanding with the diagnosis and plan.

## 2018-04-24 NOTE — PATIENT INSTRUCTIONS
Polycystic Ovary Syndrome: Care Instructions  Your Care Instructions  Polycystic ovary syndrome, or PCOS, means a woman's hormones are out of balance. It can cause problems with your periods and make it hard to get pregnant. Doctors don't know for sure what causes PCOS, but it seems to run in families. It also seems to be linked to obesity and a risk for diabetes. If you have PCOS, your sisters and daughters have a higher chance of getting it too. You may have other symptoms. These include weight gain, acne, too much hair growth on the face or body, high blood pressure, and high blood sugar. Your ovaries may have cysts on them. These cysts are growths filled with fluid. Keep in mind that although you may not have regular periods, you can still get pregnant. Talk to your doctor about birth control if you do not want to get pregnant. Sometimes the hormone changes with PCOS can also make it hard for some women to get pregnant. If this is a concern, talk to your doctor about treatment for this problem. Women who have PCOS can go for months or longer with no period. Your doctor may recommend medicines that can help get your cycles back to normal.  Follow-up care is a key part of your treatment and safety. Be sure to make and go to all appointments, and call your doctor if you are having problems. It's also a good idea to know your test results and keep a list of the medicines you take. How can you care for yourself at home? · Take your medicines exactly as prescribed. Call your doctor if you think you are having a problem with your medicine. · Eat a healthy diet. Include fruits, vegetables, beans, and whole grains in your diet each day. · If you are overweight, losing weight can help with many of the symptoms of PCOS. Talk to your doctor about safe ways to lose weight. · Get at least 30 minutes of exercise on most days of the week. Walking is a good choice.  Or you can run, swim, cycle, or play tennis or team sports. · For hair growth you don't want, try bleaching, plucking, electrolysis, or laser therapy. · Acne can be treated with over-the-counter medicines. Look for ones that have benzoyl peroxide or salicylic acid in them. When should you call for help? Call your doctor now or seek immediate medical care if:  ? · You have severe vaginal bleeding. ? · You have new or worse belly or pelvic pain. ? Watch closely for changes in your health, and be sure to contact your doctor if:  ? · You do not get better as expected. ? · You have unusual vaginal bleeding. Where can you learn more? Go to http://maureen-diana.info/. Enter J766 in the search box to learn more about \"Polycystic Ovary Syndrome: Care Instructions. \"  Current as of: October 13, 2016  Content Version: 11.4  © 2551-5405 [employer]. Care instructions adapted under license by [employer] (which disclaims liability or warranty for this information). If you have questions about a medical condition or this instruction, always ask your healthcare professional. [address] any warranty or liability for your use of this information.

## 2018-04-25 PROBLEM — E28.2 PCOS (POLYCYSTIC OVARIAN SYNDROME): Status: ACTIVE | Noted: 2018-04-25

## 2018-05-14 ENCOUNTER — TELEPHONE (OUTPATIENT)
Dept: INTERNAL MEDICINE CLINIC | Age: 23
End: 2018-05-14

## 2018-07-31 LAB
CHLAMYDIA, EXTERNAL: NEGATIVE
N. GONORRHEA, EXTERNAL: NEGATIVE

## 2018-08-15 LAB
ANTIBODY SCREEN, EXTERNAL: NEGATIVE
HBSAG, EXTERNAL: NEGATIVE
HCT, EXTERNAL: 40
HGB EVAL, EXTERNAL: NORMAL
HGB, EXTERNAL: 13.4
HIV, EXTERNAL: NON REACTIVE
PLATELET CNT,   EXTERNAL: 207
RPR, EXTERNAL: NON REACTIVE
RUBELLA, EXTERNAL: NORMAL
TYPE, ABO & RH, EXTERNAL: NORMAL

## 2018-09-05 LAB — AFPT, MATERNAL, EXTERNAL: NEGATIVE

## 2018-10-26 ENCOUNTER — OFFICE VISIT (OUTPATIENT)
Dept: OBGYN CLINIC | Age: 23
End: 2018-10-26

## 2018-10-26 VITALS
HEIGHT: 61 IN | WEIGHT: 192 LBS | DIASTOLIC BLOOD PRESSURE: 76 MMHG | SYSTOLIC BLOOD PRESSURE: 114 MMHG | BODY MASS INDEX: 36.25 KG/M2

## 2018-10-26 DIAGNOSIS — Z34.90 PREGNANCY, UNSPECIFIED GESTATIONAL AGE: ICD-10-CM
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
## 2018-10-26 NOTE — PATIENT INSTRUCTIONS
Weeks 22 to 26 of Your Pregnancy: Care Instructions Your Care Instructions As you enter your 7th month of pregnancy at week 26, your baby's lungs are growing stronger and getting ready to breathe. You may notice that your baby responds to the sound of your or your partner's voice. You may also notice that your baby does less turning and twisting and more squirming or jerking. Jerking often means that your baby has the hiccups. Hiccups are perfectly normal and are only temporary. You may want to think about attending a childbirth preparation class. This is also a good time to start thinking about whether you want to have pain medicine during labor. Most pregnant women are tested for gestational diabetes between weeks 25 and 28. Gestational diabetes occurs when your blood sugar level gets too high when you're pregnant. The test is important, because you can have gestational diabetes and not know it. But the condition can cause problems for your baby. Follow-up care is a key part of your treatment and safety. Be sure to make and go to all appointments, and call your doctor if you are having problems. It's also a good idea to know your test results and keep a list of the medicines you take. How can you care for yourself at home? Ease discomfort from your baby's kicking · Change your position. Sometimes this will cause your baby to change position too. · Take a deep breath while you raise your arm over your head. Then breathe out while you drop your arm. Do Kegel exercises to prevent urine from leaking · You can do Kegel exercises while you stand or sit. ? Squeeze the same muscles you would use to stop your urine. Your belly and thighs should not move. ? Hold the squeeze for 3 seconds, and then relax for 3 seconds. ? Start with 3 seconds. Then add 1 second each week until you are able to squeeze for 10 seconds. ? Repeat the exercise 10 to 15 times for each session.  Do three or more sessions each day. Ease or reduce swelling in your feet, ankles, hands, and fingers · If your fingers are puffy, take off your rings. · Do not eat high-salt foods, such as potato chips. · Prop up your feet on a stool or couch as much as possible. Sleep with pillows under your feet. · Do not stand for long periods of time or wear tight shoes. · Wear support stockings. Where can you learn more? Go to http://maureen-diana.info/. Enter G264 in the search box to learn more about \"Weeks 22 to 26 of Your Pregnancy: Care Instructions. \" Current as of: November 21, 2017 Content Version: 11.8 © 7306-1298 Healthwise, Incorporated. Care instructions adapted under license by [employer] (which disclaims liability or warranty for this information). If you have questions about a medical condition or this instruction, always ask your healthcare professional. [address] any warranty or liability for your use of this information.

## 2018-11-01 ENCOUNTER — OFFICE VISIT (OUTPATIENT)
Dept: INTERNAL MEDICINE CLINIC | Age: 23
End: 2018-11-01

## 2018-11-01 VITALS
TEMPERATURE: 98.5 F | HEART RATE: 108 BPM | DIASTOLIC BLOOD PRESSURE: 76 MMHG | BODY MASS INDEX: 36.38 KG/M2 | OXYGEN SATURATION: 98 % | SYSTOLIC BLOOD PRESSURE: 108 MMHG | WEIGHT: 192.7 LBS | HEIGHT: 61 IN | RESPIRATION RATE: 16 BRPM

## 2018-11-01 DIAGNOSIS — Z23 ENCOUNTER FOR IMMUNIZATION: ICD-10-CM

## 2018-11-01 DIAGNOSIS — Z34.90 PREGNANCY, UNSPECIFIED GESTATIONAL AGE: Primary | ICD-10-CM

## 2018-11-01 PROBLEM — E66.01 SEVERE OBESITY (HCC): Status: ACTIVE | Noted: 2018-11-01

## 2018-11-01 NOTE — PROGRESS NOTES
Chief Complaint Patient presents with  Routine Prenatal Visit  
 
she is a 24y.o. year old female who presents for evaluation of needing a referral for obgyn. Patient's insurance is requiring a referral for ob. She has been to several of her gynecologist.  Starting off with [facility], then [facility] and currently with [facility]. She would like to stick with them and needs a referral currently. Denies any current nausea vomiting and/or pain. Reviewed and agree with Nurse Note and duplicated in this note. Reviewed PmHx, RxHx, FmHx, SocHx, AllgHx and updated and dated in the chart. Family History Problem Relation Age of Onset  Cancer Mother   
     skin,thyroid,ovarian  Migraines Mother  Depression Mother  Anxiety Mother  Bipolar Disorder Mother  Other Father   
     prediabetes  Heart Disease Father  Hypertension Father  No Known Problems Sister  Other Brother   
     esbergers  Diabetes Paternal Uncle Past Medical History:  
Diagnosis Date  Anxiety  Borderline personality disorder ([facility] [address]  Degenerative disc disease at L5-S1 level  Depression  PCOS (polycystic ovarian syndrome) Social History Socioeconomic History  Marital status:  Spouse name: Not on file  Number of children: Not on file  Years of education: Not on file  Highest education level: Not on file Social Needs  Financial resource strain: Not on file  Food insecurity - worry: Not on file  Food insecurity - inability: Not on file  Transportation needs - medical: Not on file  Transportation needs - non-medical: Not on file Occupational History  Not on file Tobacco Use  Smoking status: Never Smoker  Smokeless tobacco: Never Used Substance and Sexual Activity  Alcohol use: No  
  Alcohol/week: 0.0 oz  
  Comment: socially  Drug use: Yes Types: Marijuana  Sexual activity: Yes  
  Partners: Male Birth control/protection: None Other Topics Concern  Not on file Social History Narrative Partner - Nicholas Carroll Review of Systems - negative except as listed above Objective:  
 
Vitals:  
 11/01/18 1547 BP: 108/76 Pulse: (!) 108 Resp: 16 Temp: 98.5 °F (36.9 °C) TempSrc: Oral  
SpO2: 98% Weight: 192 lb 11.2 oz (87.4 kg) Height: 5' 1\" (1.549 m) Physical Examination: General appearance - alert, well appearing, and in no distress Eyes - pupils equal and reactive, extraocular eye movements intact Ears - bilateral TM's and external ear canals normal 
Nose - normal and patent, no erythema, discharge or polyps Mouth - mucous membranes moist, pharynx normal without lesions Neck - supple, no significant adenopathy Chest - clear to auscultation, no wheezes, rales or rhonchi, symmetric air entry Heart - normal rate, regular rhythm, normal S1, S2, no murmurs, rubs, clicks or gallops Abdomen - soft, nontender, nondistended, no masses or organomegaly Musculoskeletal - no joint tenderness, deformity or swelling Extremities - peripheral pulses normal, no pedal edema, no clubbing or cyanosis Skin - normal coloration and turgor, no rashes, no suspicious skin lesions noted Assessment/ Plan:  
Diagnoses and all orders for this visit: 1. Pregnancy, unspecified gestational age 2. Encounter for immunization 
-     INFLUENZA VIRUS VAC QUAD,SPLIT,PRESV FREE SYRINGE IM 
-     CO IMMUNIZ ADMIN,1 SINGLE/COMB VAC/TOXOID Follow-up Disposition: Not on File I have discussed the diagnosis with the patient and the intended plan as seen in the above orders. The patient has received an after-visit summary and questions were answered concerning future plans. Medication Side Effects and Warnings were discussed with patient, Patient Labs were reviewed and or requested, and 
Patient Past Records were reviewed and or requested  yes Pt agrees to call or return to clinic and/or go to closest ER with any worsening of symptoms. This may include, but not limited to increased fever (>100.4) with NSAIDS or Tylenol, increased edema, confusion, rash, worsening of presenting symptoms.

## 2018-11-21 ENCOUNTER — ROUTINE PRENATAL (OUTPATIENT)
Dept: OBGYN CLINIC | Age: 23
End: 2018-11-21

## 2018-11-21 VITALS — DIASTOLIC BLOOD PRESSURE: 72 MMHG | SYSTOLIC BLOOD PRESSURE: 112 MMHG | WEIGHT: 197.4 LBS | BODY MASS INDEX: 37.3 KG/M2

## 2018-11-21 DIAGNOSIS — Z23 ENCOUNTER FOR IMMUNIZATION: ICD-10-CM

## 2018-11-21 DIAGNOSIS — Z3A.28 28 WEEKS GESTATION OF PREGNANCY: Primary | ICD-10-CM

## 2018-11-21 LAB
GTT, 1 HR, GLUCOLA, EXTERNAL: 87
HCT, EXTERNAL: 33
HGB, EXTERNAL: 10.7
HIV, EXTERNAL: NON REACTIVE
PLATELET CNT,   EXTERNAL: 258
T. PALLIDUM, EXTERNAL: NEGATIVE

## 2018-11-21 NOTE — PROGRESS NOTES
LIMITED OB SCAN- TRANSFER PATIENT  A SINGLE VERTEX 28W4D IUP IS SEEN. FETAL CARDIAC MOTION OBSERVED. LIMITED ANATOMY WAS VISUALIZED AND APPEARS WNL. APPROPRIATE GROWTH 53%ile MEASURED. SIZE = DATES. TENNILLE AND PLACENTA APPEAR WITHIN NORMAL LIMITS. Tdap vaccine, Lot [id number], Exp 4/3/21, given IM in right deltoid without complication per MD order. Consent signed. Pt with some epigastric discomfort after eating large meals. Takes Tums prn for GERD. Feels like she is running out of space. Advised small frequent meals, avoidance of spicy greasy foods, with Pepcid prn. Otherwise doing well. Good Fm, no LOF, no VB.      Tdap and 28 wk labs today    Records again requested from [facility]    RTC: 2 wks

## 2018-11-21 NOTE — PATIENT INSTRUCTIONS

## 2018-11-23 LAB
ERYTHROCYTE [DISTWIDTH] IN BLOOD BY AUTOMATED COUNT: 13 % (ref 12.3–15.4)
GLUCOSE 1H P 50 G GLC PO SERPL-MCNC: 87 MG/DL (ref 65–129)
HCT VFR BLD AUTO: 33 % (ref 34–46.6)
HGB BLD-MCNC: 10.7 G/DL (ref 11.1–15.9)
HIV 1+2 AB+HIV1 P24 AG SERPL QL IA: NON REACTIVE
MCH RBC QN AUTO: 28.1 PG (ref 26.6–33)
MCHC RBC AUTO-ENTMCNC: 32.4 G/DL (ref 31.5–35.7)
MCV RBC AUTO: 87 FL (ref 79–97)
PLATELET # BLD AUTO: 258 X10E3/UL (ref 150–379)
RBC # BLD AUTO: 3.81 X10E6/UL (ref 3.77–5.28)
T PALLIDUM AB SER QL IA: NEGATIVE
WBC # BLD AUTO: 9.9 X10E3/UL (ref 3.4–10.8)

## 2018-11-23 NOTE — PROGRESS NOTES
kellie 86    28 wk labs wnl with exception of mild anemia, Hgb 10.7. Please inform patient, advise iron rich diet, and iron supplementation with ferrous sulfate 325mg daily. Thanks!

## 2018-11-25 ENCOUNTER — TELEPHONE (OUTPATIENT)
Dept: INTERNAL MEDICINE CLINIC | Age: 23
End: 2018-11-25

## 2018-11-25 RX ORDER — OSELTAMIVIR PHOSPHATE 75 MG/1
75 CAPSULE ORAL 2 TIMES DAILY
Qty: 10 CAP | Refills: 0 | Status: SHIPPED | OUTPATIENT
Start: 2018-11-25 | End: 2018-11-30

## 2018-11-25 NOTE — TELEPHONE ENCOUNTER
Patients calls with her  diagnosed with influenza A today. Both she and he have had vaccinations. She is 7 months pregnant. She has fever to 99.9 as well as aches and cough with clear sputum. I advised her to call her OB and check on the ability to use tamiflu. I also reviewed \"Up to Date\" and the consensus is that benefit out weighs risk of the treatment with tamiflu. She called me back and the OB concurred. RX called in and she will use tylenol as needed for fever.

## 2018-12-05 ENCOUNTER — ROUTINE PRENATAL (OUTPATIENT)
Dept: OBGYN CLINIC | Age: 23
End: 2018-12-05

## 2018-12-05 VITALS — BODY MASS INDEX: 37.64 KG/M2 | SYSTOLIC BLOOD PRESSURE: 122 MMHG | DIASTOLIC BLOOD PRESSURE: 78 MMHG | WEIGHT: 199.2 LBS

## 2018-12-05 DIAGNOSIS — Z3A.30 30 WEEKS GESTATION OF PREGNANCY: Primary | ICD-10-CM

## 2018-12-05 NOTE — PATIENT INSTRUCTIONS

## 2018-12-05 NOTE — PROGRESS NOTES
Flu last week. Feeling better now. Good fetal movement. Denies vaginal bleeding, leaking fluid, contractions, swelling. Mood is stable off meds, open to counseling and medication postpartum  Started pregnancy classes. Reviewed 28 wk labs - wnl.     RTC: 2 weeks

## 2018-12-19 ENCOUNTER — ROUTINE PRENATAL (OUTPATIENT)
Dept: OBGYN CLINIC | Age: 23
End: 2018-12-19

## 2018-12-19 VITALS — BODY MASS INDEX: 38.36 KG/M2 | SYSTOLIC BLOOD PRESSURE: 112 MMHG | WEIGHT: 203 LBS | DIASTOLIC BLOOD PRESSURE: 72 MMHG

## 2018-12-19 DIAGNOSIS — Z3A.32 32 WEEKS GESTATION OF PREGNANCY: Primary | ICD-10-CM

## 2018-12-19 DIAGNOSIS — O36.8130 DECREASED FETAL MOVEMENTS IN THIRD TRIMESTER, SINGLE OR UNSPECIFIED FETUS: ICD-10-CM

## 2018-12-19 NOTE — PROGRESS NOTES
Doing well.    No ctx, no LOF, no VB  Reports decreased fetal movement, significantly less movement for the last 1-2 weeks, reports she only feels baby move 3-4 times per day --> NST/BPP today  Reviewed birth plan, discussed doulas          ADDENDUM:  NST: cat I tracing  Indication: decreased FM  FHTs: baseline 130s, moderate variability, +accels, no decels  [place]: no ctx  Time: 20 min    Yunier Navarro MD  12/19/2018  10:58 AM

## 2018-12-19 NOTE — PATIENT INSTRUCTIONS
Weeks 32 to 34 of Your Pregnancy: Care Instructions  Your Care Instructions    During the last few weeks of your pregnancy, you may have more aches and pains. It's important to rest when you can. Your growing baby is putting more pressure on your bladder. So you may need to urinate more often. Hemorrhoids are also common. These are painful, itchy veins in the rectal area. In the 36th week, most women have a test for group B streptococcus (GBS). GBS is a common bacteria that can live in the vagina and rectum. It can make your baby sick after birth. If you test positive, you will get antibiotics during labor. These will keep your baby from getting the bacteria. You may want to talk with your doctor about banking your baby's umbilical cord blood. This is the blood left in the cord after birth. If you want to save this blood, you must arrange it ahead of time. You can't decide at the last minute. If you haven't already had the Tdap shot during this pregnancy, talk to your doctor about getting it. It will help protect your  against pertussis infection. Follow-up care is a key part of your treatment and safety. Be sure to make and go to all appointments, and call your doctor if you are having problems. It's also a good idea to know your test results and keep a list of the medicines you take. How can you care for yourself at home? Ease hemorrhoids  · Get more liquids, fruits, vegetables, and fiber in your diet. This will help keep your stools soft. · Avoid sitting for too long. Lie on your left side several times a day. · Clean yourself with soft, moist toilet paper. Or you can use witch hazel pads or personal hygiene pads. · If you are uncomfortable, try ice packs. Or you can sit in a warm sitz bath. Do these for 20 minutes at a time, as needed. · Use hydrocortisone cream for pain and itching. Two examples are Anusol and Preparation H Hydrocortisone.   · Ask your doctor about taking an over-the-counter stool softener. Consider breastfeeding  · Experts recommend that women breastfeed for 1 year or longer. Breast milk is the perfect food for babies. · Breast milk is easier for babies to digest than formula. And it is always available, just the right temperature, and free. · Breast milk may help protect your child from some health problems.  babies are less likely than formula-fed babies to:  ? Get ear infections, colds, diarrhea, and pneumonia. ? Be obese or get diabetes later in life. · Women who breastfeed have less bleeding after the birth. Their uteruses also shrink back faster. · Some women who breastfeed lose weight faster. Making milk burns calories. · Breastfeeding can lower your risk of breast cancer, ovarian cancer, and osteoporosis. Decide about circumcision for boys  · As you make this decision, it may help to think about your personal, Christian, and family traditions. You get to decide if you will keep your son's penis natural or if he will be circumcised. · If you decide that you would like to have your baby circumcised, talk with your doctor. You can share your concerns about pain. And you can discuss your preferences for anesthesia. Where can you learn more? Go to http://maureen-diana.info/. Enter S105 in the search box to learn more about \"Weeks 32 to 34 of Your Pregnancy: Care Instructions. \"  Current as of: November 21, 2017  Content Version: 11.8  © 1538-2049 Healthwise, Incorporated. Care instructions adapted under license by [employer] (which disclaims liability or warranty for this information). If you have questions about a medical condition or this instruction, always ask your healthcare professional. [place] [address] any warranty or liability for your use of this information.

## 2019-01-02 ENCOUNTER — ROUTINE PRENATAL (OUTPATIENT)
Dept: OBGYN CLINIC | Age: 24
End: 2019-01-02

## 2019-01-02 VITALS — SYSTOLIC BLOOD PRESSURE: 110 MMHG | BODY MASS INDEX: 38.66 KG/M2 | WEIGHT: 204.6 LBS | DIASTOLIC BLOOD PRESSURE: 72 MMHG

## 2019-01-02 DIAGNOSIS — Z34.90 PREGNANCY, UNSPECIFIED GESTATIONAL AGE: ICD-10-CM

## 2019-01-02 NOTE — PATIENT INSTRUCTIONS
Weeks 34 to 36 of Your Pregnancy: Care Instructions  Your Care Instructions    By now, your baby and your belly have grown quite large. It is almost time to give birth. A full-term pregnancy can deliver between 37 and 42 weeks. Your baby's lungs are almost ready to breathe air. The bones in your baby's head are now firm enough to protect it, but soft enough to move down through the birth canal.  You may feel excited, happy, anxious, or scared. You may wonder how you will know if you are in labor or what to expect during labor. Try to be flexible in your expectations of the birth. Because each birth is different, there is no way to know exactly what childbirth will be like for you. This care sheet will help you know what to expect and how to prepare. This may make your childbirth easier. If you haven't already had the Tdap shot during this pregnancy, talk to your doctor about getting it. It will help protect your  against pertussis infection. In the 36th week, most women have a test for group B streptococcus (GBS). GBS is a common bacteria that can live in the vagina and rectum. It can make your baby sick after birth. If you test positive, you will get antibiotics during labor. The medicine will keep your baby from getting the bacteria. Follow-up care is a key part of your treatment and safety. Be sure to make and go to all appointments, and call your doctor if you are having problems. It's also a good idea to know your test results and keep a list of the medicines you take. How can you care for yourself at home? Learn about pain relief choices  · Pain is different for every woman. Talk with your doctor about your feelings about pain. · You can choose from several types of pain relief. These include medicine or breathing techniques, as well as comfort measures. You can use more than one option. · If you choose to have pain medicine during labor, talk to your doctor about your options.  Some medicines lower anxiety and help with some of the pain. Others make your lower body numb so that you won't feel pain. · Be sure to tell your doctor about your pain medicine choice before you start labor or very early in your labor. You may be able to change your mind as labor progresses. · Rarely, a woman is put to sleep by medicine given through a mask or an IV. Labor and delivery  · The first stage of labor has three parts: early, active, and transition. ? Most women have early labor at home. You can stay busy or rest, eat light snacks, drink clear fluids, and start counting contractions. ? When talking during a contraction gets hard, you may be moving to active labor. During active labor, you should head for the hospital if you are not there already. ? You are in active labor when contractions come every 3 to 4 minutes and last about 60 seconds. Your cervix is opening more rapidly. ? If your water breaks, contractions will come faster and stronger. ? During transition, your cervix is stretching, and contractions are coming more rapidly. ? You may want to push, but your cervix might not be ready. Your doctor will tell you when to push. · The second stage starts when your cervix is completely opened and you are ready to push. ? Contractions are very strong to push the baby down the birth canal.  ? You will feel the urge to push. You may feel like you need to have a bowel movement. ? You may be coached to push with contractions. These contractions will be very strong, but you will not have them as often. You can get a little rest between contractions. ? You may be emotional and irritable. You may not be aware of what is going on around you.  ? One last push, and your baby is born. · The third stage is when a few more contractions push out the placenta. This may take 30 minutes or less. · The fourth stage is the welcome recovery. You may feel overwhelmed with emotions and exhausted but alert.  This is a good time to start breastfeeding. Where can you learn more? Go to http://maureen-diana.info/. Enter M041 in the search box to learn more about \"Weeks 34 to 36 of Your Pregnancy: Care Instructions. \"  Current as of: November 21, 2017  Content Version: 11.8  © 2107-3470 Healthwise, [employer]. Care instructions adapted under license by [employer] (which disclaims liability or warranty for this information). If you have questions about a medical condition or this instruction, always ask your healthcare professional. [address] any warranty or liability for your use of this information.

## 2019-01-02 NOTE — PROGRESS NOTES
Doing well. Good fetal movement. No LOF, VB, or ctx. No other concerns today.   Discussed plans for depression management postpartum, will consider restarting SSRI in immediate postpartum period, but currently pt reports mood has overall been very good    RTC: 2 weeks --> GBS and check at that visit

## 2019-01-16 ENCOUNTER — ROUTINE PRENATAL (OUTPATIENT)
Dept: OBGYN CLINIC | Age: 24
End: 2019-01-16

## 2019-01-16 VITALS — WEIGHT: 202.6 LBS | DIASTOLIC BLOOD PRESSURE: 74 MMHG | SYSTOLIC BLOOD PRESSURE: 112 MMHG | BODY MASS INDEX: 38.28 KG/M2

## 2019-01-16 DIAGNOSIS — Z34.90 PREGNANCY, UNSPECIFIED GESTATIONAL AGE: Primary | ICD-10-CM

## 2019-01-16 LAB — GRBS, EXTERNAL: NEGATIVE

## 2019-01-16 NOTE — PATIENT INSTRUCTIONS
Weeks 34 to 36 of Your Pregnancy: Care Instructions  Your Care Instructions    By now, your baby and your belly have grown quite large. It is almost time to give birth. A full-term pregnancy can deliver between 37 and 42 weeks. Your baby's lungs are almost ready to breathe air. The bones in your baby's head are now firm enough to protect it, but soft enough to move down through the birth canal.  You may feel excited, happy, anxious, or scared. You may wonder how you will know if you are in labor or what to expect during labor. Try to be flexible in your expectations of the birth. Because each birth is different, there is no way to know exactly what childbirth will be like for you. This care sheet will help you know what to expect and how to prepare. This may make your childbirth easier. If you haven't already had the Tdap shot during this pregnancy, talk to your doctor about getting it. It will help protect your  against pertussis infection. In the 36th week, most women have a test for group B streptococcus (GBS). GBS is a common bacteria that can live in the vagina and rectum. It can make your baby sick after birth. If you test positive, you will get antibiotics during labor. The medicine will keep your baby from getting the bacteria. Follow-up care is a key part of your treatment and safety. Be sure to make and go to all appointments, and call your doctor if you are having problems. It's also a good idea to know your test results and keep a list of the medicines you take. How can you care for yourself at home? Learn about pain relief choices  · Pain is different for every woman. Talk with your doctor about your feelings about pain. · You can choose from several types of pain relief. These include medicine or breathing techniques, as well as comfort measures. You can use more than one option. · If you choose to have pain medicine during labor, talk to your doctor about your options.  Some medicines lower anxiety and help with some of the pain. Others make your lower body numb so that you won't feel pain. · Be sure to tell your doctor about your pain medicine choice before you start labor or very early in your labor. You may be able to change your mind as labor progresses. · Rarely, a woman is put to sleep by medicine given through a mask or an IV. Labor and delivery  · The first stage of labor has three parts: early, active, and transition. ? Most women have early labor at home. You can stay busy or rest, eat light snacks, drink clear fluids, and start counting contractions. ? When talking during a contraction gets hard, you may be moving to active labor. During active labor, you should head for the hospital if you are not there already. ? You are in active labor when contractions come every 3 to 4 minutes and last about 60 seconds. Your cervix is opening more rapidly. ? If your water breaks, contractions will come faster and stronger. ? During transition, your cervix is stretching, and contractions are coming more rapidly. ? You may want to push, but your cervix might not be ready. Your doctor will tell you when to push. · The second stage starts when your cervix is completely opened and you are ready to push. ? Contractions are very strong to push the baby down the birth canal.  ? You will feel the urge to push. You may feel like you need to have a bowel movement. ? You may be coached to push with contractions. These contractions will be very strong, but you will not have them as often. You can get a little rest between contractions. ? You may be emotional and irritable. You may not be aware of what is going on around you.  ? One last push, and your baby is born. · The third stage is when a few more contractions push out the placenta. This may take 30 minutes or less. · The fourth stage is the welcome recovery. You may feel overwhelmed with emotions and exhausted but alert.  This is a good time to start breastfeeding. Where can you learn more? Go to http://maureen-diana.info/. Enter F744 in the search box to learn more about \"Weeks 34 to 36 of Your Pregnancy: Care Instructions. \"  Current as of: November 21, 2017  Content Version: 11.8  © 9718-2166 Healthwise, [employer]. Care instructions adapted under license by [employer] (which disclaims liability or warranty for this information). If you have questions about a medical condition or this instruction, always ask your healthcare professional. [address] any warranty or liability for your use of this information.

## 2019-01-16 NOTE — PROGRESS NOTES
GBS today  Occasional nausea. Few bhc's. Good fetal movement. Denies vaginal bleeding or leaking fluid.      Anton Boss MD  1/16/2019  10:47 AM

## 2019-01-20 LAB — B-HEM STREP SPEC QL CULT: NEGATIVE

## 2019-01-23 ENCOUNTER — ROUTINE PRENATAL (OUTPATIENT)
Dept: OBGYN CLINIC | Age: 24
End: 2019-01-23

## 2019-01-23 VITALS — WEIGHT: 204.8 LBS | SYSTOLIC BLOOD PRESSURE: 112 MMHG | BODY MASS INDEX: 38.7 KG/M2 | DIASTOLIC BLOOD PRESSURE: 74 MMHG

## 2019-01-23 DIAGNOSIS — Z34.90 PREGNANCY, UNSPECIFIED GESTATIONAL AGE: ICD-10-CM

## 2019-01-23 NOTE — PROGRESS NOTES
Right side pelvic pain and pressure. Denies vaginal bleeding, leaking, or contractions. Good FM.   Cervix 1.5/50/-3, soft, mid-position  Reviewed labor precautions  Discussed pediatricians, and postpartum plans    RTC 1 wk

## 2019-01-23 NOTE — PATIENT INSTRUCTIONS

## 2019-01-30 ENCOUNTER — ROUTINE PRENATAL (OUTPATIENT)
Dept: OBGYN CLINIC | Age: 24
End: 2019-01-30

## 2019-01-30 VITALS — BODY MASS INDEX: 39.15 KG/M2 | SYSTOLIC BLOOD PRESSURE: 118 MMHG | WEIGHT: 207.2 LBS | DIASTOLIC BLOOD PRESSURE: 80 MMHG

## 2019-01-30 DIAGNOSIS — Z3A.38 38 WEEKS GESTATION OF PREGNANCY: Primary | ICD-10-CM

## 2019-01-30 RX ORDER — CALCIUM CARBONATE 200(500)MG
1 TABLET,CHEWABLE ORAL DAILY
COMMUNITY
End: 2021-06-22 | Stop reason: ALTCHOICE

## 2019-01-30 NOTE — PROGRESS NOTES
Doing well. Good fetal movement. Denies vaginal bleeding, leaking fluid, or contractions.    Cervix 2cm dilated, desires membrane sweep next week    RTC: 1 week

## 2019-01-30 NOTE — PATIENT INSTRUCTIONS

## 2019-02-06 ENCOUNTER — ROUTINE PRENATAL (OUTPATIENT)
Dept: OBGYN CLINIC | Age: 24
End: 2019-02-06

## 2019-02-06 VITALS — DIASTOLIC BLOOD PRESSURE: 80 MMHG | BODY MASS INDEX: 39.87 KG/M2 | WEIGHT: 211 LBS | SYSTOLIC BLOOD PRESSURE: 120 MMHG

## 2019-02-06 DIAGNOSIS — Z3A.39 39 WEEKS GESTATION OF PREGNANCY: Primary | ICD-10-CM

## 2019-02-06 NOTE — PROGRESS NOTES
Nausea returning. Chest pain last night, denies shortness of breath. [address] Good fetal movement. Cervix 2-3cm/50/-3, membrane sweep  Labor precautions.     RTC: 1 wk with CNMs

## 2019-02-06 NOTE — PATIENT INSTRUCTIONS
Week 39 of Your Pregnancy: Care Instructions  Your Care Instructions    During these final weeks, you may feel anxious to see your new baby. Vaughn babies often look different from what you see in pictures or movies. Right after birth, their heads may have a strange shape. Their eyes may be puffy. And their genitals may be swollen. They may also have very dry skin, or red marks on the eyelids, nose, or neck. Still, most parents think their babies are beautiful. Follow-up care is a key part of your treatment and safety. Be sure to make and go to all appointments, and call your doctor if you are having problems. It's also a good idea to know your test results and keep a list of the medicines you take. How can you care for yourself at home? Prepare to breastfeed  · If you are breastfeeding, continue to eat healthy foods. · If your health care provider recommends it, keep taking your prenatal vitamins. · Talk to your doctor before you take any medicine or supplement. That's because some medicines can affect your breast milk. · You can help prevent sore nipples if you feed your baby in the correct position. Nurses will help you learn to do this. · Your  will need to be fed about every 1 to 3 hours. Choose the right birth control after your baby is born  · Women who are breastfeeding can still get pregnant. Use birth control if you don't want to get pregnant. · Intrauterine devices (IUDs) work for women who want to wait at least 2 years before getting pregnant again. They are safe to use while you are breastfeeding. · Depo-Provera can be used while you are breastfeeding. It is a shot you get every 3 months. · Birth control pills work well. But you need a different kind of pill while you are breastfeeding. And when you start taking these pills, you need to make sure to use another type of birth control until you start your second pack.   · Diaphragms, cervical caps, tubal implants, and condoms with spermicide work less well after birth. If you have a diaphragm or cervical cap, you will need to have it refitted. · Tubal ligation (tying your tubes) and vasectomy are both permanent. These are good options if you are sure you are done having children. Where can you learn more? Go to http://maureen-diana.info/. Enter P974 in the search box to learn more about \"Week 39 of Your Pregnancy: Care Instructions. \"  Current as of: September 5, 2018  Content Version: 11.9  © 6573-2066 Healthwise, [employer]. Care instructions adapted under license by [employer] (which disclaims liability or warranty for this information). If you have questions about a medical condition or this instruction, always ask your healthcare professional. [address] any warranty or liability for your use of this information.

## 2019-02-10 ENCOUNTER — HOSPITAL ENCOUNTER (EMERGENCY)
Age: 24
Discharge: HOME | End: 2019-02-11
Attending: OBSTETRICS & GYNECOLOGY | Admitting: OBSTETRICS & GYNECOLOGY
Payer: OTHER GOVERNMENT

## 2019-02-11 ENCOUNTER — ROUTINE PRENATAL (OUTPATIENT)
Dept: OBGYN CLINIC | Age: 24
End: 2019-02-11

## 2019-02-11 VITALS
TEMPERATURE: 98.4 F | WEIGHT: 211 LBS | BODY MASS INDEX: 39.84 KG/M2 | HEIGHT: 61 IN | HEART RATE: 85 BPM | SYSTOLIC BLOOD PRESSURE: 126 MMHG | RESPIRATION RATE: 16 BRPM | DIASTOLIC BLOOD PRESSURE: 79 MMHG

## 2019-02-11 VITALS
BODY MASS INDEX: 40.48 KG/M2 | SYSTOLIC BLOOD PRESSURE: 130 MMHG | DIASTOLIC BLOOD PRESSURE: 86 MMHG | HEIGHT: 61 IN | WEIGHT: 214.4 LBS

## 2019-02-11 DIAGNOSIS — Z34.03 ENCOUNTER FOR SUPERVISION OF NORMAL FIRST PREGNANCY IN THIRD TRIMESTER: Primary | ICD-10-CM

## 2019-02-11 PROCEDURE — 99283 EMERGENCY DEPT VISIT LOW MDM: CPT

## 2019-02-11 RX ORDER — UREA 10 %
1 LOTION (ML) TOPICAL
COMMUNITY
End: 2021-06-22 | Stop reason: ALTCHOICE
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
## 2019-02-11 NOTE — DISCHARGE SUMMARY
Antepartum  Discharge Summary     Patient ID:  Román German  [id number]  81 y.o.  1995    Admit date: 2/10/2019    Discharge date: 2/11/2019    Admission Diagnoses:    Patient Active Problem List   Diagnosis Code    Anxiety F41.9    Borderline personality disorder ([facility] [address] F60.3    Recurrent UTI N39.0    Hx of breast implants, bilateral Z98.82    PCOS (polycystic ovarian syndrome) E28.2    Pregnancy Z34.90    Severe obesity ([facility] [address] E66.01     Uterine Contractions  Discharge Diagnoses: Hiro Bhatia Contractions  Patient Active Problem List   Diagnosis Code    Anxiety F41.9    Borderline personality disorder ([facility] [address] F60.3    Recurrent UTI N39.0    Hx of breast implants, bilateral Z98.82    PCOS (polycystic ovarian syndrome) E28.2    Pregnancy Z34.90    Severe obesity ([facility] [address] E66.01       Procedures for this admission: NST    Hospital Course:   Uneventful, REactive NST    Disposition: Home or self care    Discharged Condition: stable    Patient plans to return for changes in her condition or the condition of the baby or for delivery of the baby. Patient Instructions:   Current Discharge Medication List      CONTINUE these medications which have NOT CHANGED    Details   melatonin 1 mg tablet Take 1 mg by mouth.      calcium carbonate (TUMS) 200 mg calcium (500 mg) chew Take 1 Tab by mouth daily. ascorbate calcium (VITAMIN C PO) Take  by mouth.      multivit with min-folic acid (ADULT MULTIVITAMIN GUMMIES) 200 mcg chew Take  by mouth. venlafaxine-SR (EFFEXOR-XR) 37.5 mg capsule Take 1 Cap by mouth daily. Qty: 30 Cap, Refills: 0           Activity: Activity as tolerated  Diet: Regular Diet    Follow-up with   Follow-up Appointments   Procedures    FOLLOW UP VISIT Appointment in: Other ([facility]) Tuesday regularly scheduled appt     Tuesday regularly scheduled appt     Standing Status:   Standing     Number of Occurrences:   1     Order Specific Question:   Appointment in     Answer:    Other (Specify)        Signed:  Violeta Christine NP  2/11/2019  12:44 AM

## 2019-02-11 NOTE — DISCHARGE INSTRUCTIONS
You came to the hospital because you thought you were in labor. This information may help you decide when you need to call your provider and return to the hospital.     Am I in Labor? ?  While each woman may feel contractions differently, these facts may help you decide if you are in true labor. A contraction is a painful tightening of the uterus. It may feel like the baby is sitting up, a bad backache or severe menstrual cramps. Sometimes women have contractions that do not cause cervical change- this is often called \"false labor. \"    In TRUE LABOR contractions: In FALSE LABOR contractions:   * Occur regularly every 5 min or less * Occur irregularly   * Feel stronger and hurt more * Stay the same or space out   * Become stronger with walking * Strength stays the same or they hurt less  when walking   * Pinkish mucus or spotting maybe present          Call your provider if:  Regular, painful contractions every 5 minutes or less for one hour. Time your contractions   * You have a gush of fluid or blood from your vaginal (it is normal to have spotting after a vaginal exam or intercourse). * Your baby is not moving as much as usual- at least 4 fetal movements in 1 hour after drinking and resting on your side for 1 hour. Report one or more of the following: SWELLING (Edema)    Fever 101 or above orally   Avoid standing for long periods of time, keep your legs up when you can.    Vaginal bleeding (bright red, like a period)  {OB ACT/PIH:[id number]}   Uterine Contractions (4 to 6 in 1 hours time) Limit the amount of salty foods you eat   Suspected leakage from your bag of water Try to wear support hose   Decreased fetal movment                      SIGNS AND SYMPTOMS OF LABOR  * Uterine cramping * Low abdominal pressure (pelvic pressure)   * Uterine contractions every 10-15 minutes or more * Dull low backache (intermittent or constant)   * Abdominal cramping with or without diarrhea * Feeling like your baby is pushing down   * Increase or change in vaginal discharge      When these symptoms are experienced. .. STOP what you are doing, lie on your side, drink two or three glasses of water or juice, and wait one hour. If the symptoms worsen call your care provider. If the symptoms go away inform your care provider at the next visit that this happened.

## 2019-02-11 NOTE — PATIENT INSTRUCTIONS
Week 40 of Your Pregnancy: Care Instructions  Your Care Instructions    By week 40, you have reached your due date. Your baby could be coming any day. But it's a good idea to think ahead to the next few weeks and what might happen. If this is your first time having a baby, try not to worry. If you don't start labor on your own by 41 or 42 weeks, your doctor may recommend giving you medicines to start labor. This care sheet gives you information about how labor can be started. It also gives you some ideas about breathing exercises you can do if you start to feel anxious or if you are trying to relax. Follow-up care is a key part of your treatment and safety. Be sure to make and go to all appointments, and call your doctor if you are having problems. It's also a good idea to know your test results and keep a list of the medicines you take. How can you care for yourself at home? Learn how labor can be started  · If you and your baby are both healthy and ready, and if your cervix has started to open, your doctor may \"break your water\" (rupture the amniotic sac). This often starts labor. · If your cervix is not quite ready, you may get a medicine called Pitocin through an IV to start contractions. · If your cervix is still very firm, you may have prostaglandin tablets (misoprostol) placed in your vagina to soften the cervix. Try guided imagery to help you relax  · Find a comfortable place to sit or lie down. Close your eyes. · Start by just taking a few deep breaths to help you relax. · Picture a setting that is calm and peaceful. This could be a beach, a mountain setting, a meadow, or a scene that you choose. · Imagine your scene, and try to add some detail. For example, is there a breeze? What does the meek look like? Is it clear, or are there clouds? · It often helps to add a path to your scene.  For example, as you enter the meadow, imagine a path leading you through the meadow to the trees on the other side. As you follow the path farther into the [facility] you feel more and more relaxed. · When you are deep into your scene and are feeling relaxed, take a few minutes to breathe slowly and feel the calm. · When you are ready, slowly take yourself out of the scene back to the present. Tell yourself that you will feel relaxed and refreshed and will bring that sense of calm with you. · Count to 3, and open your eyes. Where can you learn more? Go to http://maureen-diana.info/. Enter L226 in the search box to learn more about \"Week 40 of Your Pregnancy: Care Instructions. \"  Current as of: September 5, 2018  Content Version: 11.9  © 5499-3772 [employer]. Care instructions adapted under license by [employer] (which disclaims liability or warranty for this information). If you have questions about a medical condition or this instruction, always ask your healthcare professional. [address] any warranty or liability for your use of this information.

## 2019-02-11 NOTE — PROGRESS NOTES
~2340: The patient arrived by wheelchair with reports of back discomfort (pt confirms chronic back pain), hip pain, ? Contractions, and chest heaviness. The patient denies leaking of fluid or vaginal bleeding and reports active fetal movement. The patient is escorted to L&D 7. 
[email or web address]@[email or web address]: Cora Vazquez is called and made aware of the patient's arrival.  She states that she will come to the bedside for an assessment. 
~0029: Cora Vazquez is at the bedside speaking with the patient and her . SVE is unchanged. Verbal order is received to discharge the patient. 
~0055: Verbal and a copy of the discharge instructions are given to the patient for rule out labor. The patient verbalizes understanding. All belongings are collected and the patient is discharged ambulatory with her .

## 2019-02-11 NOTE — PROGRESS NOTES
Seen on L&D yesterday for rule out labor  BRADFORD's yesterday and vision changes in the shower    Pt doing well would like to have IOL by 41 weeks if not delivered, reviewed SOL, [address], IOL versus natural labor - pt and partner verbalized understanding will send request to L&D for IOL

## 2019-02-13 ENCOUNTER — ROUTINE PRENATAL (OUTPATIENT)
Dept: OBGYN CLINIC | Age: 24
End: 2019-02-13

## 2019-02-13 ENCOUNTER — TELEPHONE (OUTPATIENT)
Dept: OBGYN CLINIC | Age: 24
End: 2019-02-13

## 2019-02-13 VITALS
HEIGHT: 61 IN | WEIGHT: 212.4 LBS | BODY MASS INDEX: 40.1 KG/M2 | DIASTOLIC BLOOD PRESSURE: 80 MMHG | SYSTOLIC BLOOD PRESSURE: 120 MMHG

## 2019-02-13 DIAGNOSIS — Z3A.40 40 WEEKS GESTATION OF PREGNANCY: ICD-10-CM

## 2019-02-13 DIAGNOSIS — Z34.03 ENCOUNTER FOR SUPERVISION OF NORMAL FIRST PREGNANCY IN THIRD TRIMESTER: Primary | ICD-10-CM

## 2019-02-13 NOTE — TELEPHONE ENCOUNTER
Returned call to patient. Requesting earlier induction than Friday. Pt states she is uncomfortable, having trouble breathing and trouble sleeping. Pt states that she was told by Elena Joseph, L&D scheduling, that all we needed to do was send an updated for requesting an earlier induction. Advised patient she needs to see a provider in office to discuss this. The patient verbalized understanding and was placed on the schedule for today @ 1030.

## 2019-02-13 NOTE — TELEPHONE ENCOUNTER
02/13/19 9:14 patient is requesting on nurse voice mail to speak directly with Louie Lovell, no details left.

## 2019-02-13 NOTE — TELEPHONE ENCOUNTER
Left message to call office. Calling to advise patient of induction and PAT details. IOL scheduled 2/15 @ 0600; PAT scheduled 2/14 @ 0900. A [employer] message was sent to the patient yesterday advising of these details.

## 2019-02-13 NOTE — PATIENT INSTRUCTIONS
Week 40 of Your Pregnancy: Care Instructions  Your Care Instructions    By week 40, you have reached your due date. Your baby could be coming any day. But it's a good idea to think ahead to the next few weeks and what might happen. If this is your first time having a baby, try not to worry. If you don't start labor on your own by 41 or 42 weeks, your doctor may recommend giving you medicines to start labor. This care sheet gives you information about how labor can be started. It also gives you some ideas about breathing exercises you can do if you start to feel anxious or if you are trying to relax. Follow-up care is a key part of your treatment and safety. Be sure to make and go to all appointments, and call your doctor if you are having problems. It's also a good idea to know your test results and keep a list of the medicines you take. How can you care for yourself at home? Learn how labor can be started  · If you and your baby are both healthy and ready, and if your cervix has started to open, your doctor may \"break your water\" (rupture the amniotic sac). This often starts labor. · If your cervix is not quite ready, you may get a medicine called Pitocin through an IV to start contractions. · If your cervix is still very firm, you may have prostaglandin tablets (misoprostol) placed in your vagina to soften the cervix. Try guided imagery to help you relax  · Find a comfortable place to sit or lie down. Close your eyes. · Start by just taking a few deep breaths to help you relax. · Picture a setting that is calm and peaceful. This could be a beach, a mountain setting, a meadow, or a scene that you choose. · Imagine your scene, and try to add some detail. For example, is there a breeze? What does the meek look like? Is it clear, or are there clouds? · It often helps to add a path to your scene.  For example, as you enter the meadow, imagine a path leading you through the meadow to the trees on the other side. As you follow the path farther into the [facility] you feel more and more relaxed. · When you are deep into your scene and are feeling relaxed, take a few minutes to breathe slowly and feel the calm. · When you are ready, slowly take yourself out of the scene back to the present. Tell yourself that you will feel relaxed and refreshed and will bring that sense of calm with you. · Count to 3, and open your eyes. Where can you learn more? Go to http://maureen-diana.info/. Enter V300 in the search box to learn more about \"Week 40 of Your Pregnancy: Care Instructions. \"  Current as of: September 5, 2018  Content Version: 11.9  © 0077-5822 [employer]. Care instructions adapted under license by [employer] (which disclaims liability or warranty for this information). If you have questions about a medical condition or this instruction, always ask your healthcare professional. [address] any warranty or liability for your use of this information.

## 2019-02-14 ENCOUNTER — HOSPITAL ENCOUNTER (INPATIENT)
Age: 24
LOS: 3 days | Discharge: HOME | End: 2019-02-17
Attending: OBSTETRICS & GYNECOLOGY | Admitting: OBSTETRICS & GYNECOLOGY
Payer: OTHER GOVERNMENT

## 2019-02-14 ENCOUNTER — ANESTHESIA EVENT (OUTPATIENT)
Dept: LABOR AND DELIVERY | Age: 24
End: 2019-02-14
Payer: OTHER GOVERNMENT

## 2019-02-14 ENCOUNTER — ANESTHESIA (OUTPATIENT)
Dept: LABOR AND DELIVERY | Age: 24
End: 2019-02-14
Payer: OTHER GOVERNMENT

## 2019-02-14 LAB
ERYTHROCYTE [DISTWIDTH] IN BLOOD BY AUTOMATED COUNT: 14.4 % (ref 11.5–14.5)
HCT VFR BLD AUTO: 32.8 % (ref 35–47)
HGB BLD-MCNC: 10.2 G/DL (ref 11.5–16)
MCH RBC QN AUTO: 24.9 PG (ref 26–34)
MCHC RBC AUTO-ENTMCNC: 31.1 G/DL (ref 30–36.5)
MCV RBC AUTO: 80 FL (ref 80–99)
NRBC # BLD: 0 K/UL (ref 0–0.01)
NRBC BLD-RTO: 0 PER 100 WBC
PLATELET # BLD AUTO: 259 K/UL (ref 150–400)
PMV BLD AUTO: 11.4 FL (ref 8.9–12.9)
RBC # BLD AUTO: 4.1 M/UL (ref 3.8–5.2)
WBC # BLD AUTO: 13.6 K/UL (ref 3.6–11)

## 2019-02-14 PROCEDURE — 36415 COLL VENOUS BLD VENIPUNCTURE: CPT

## 2019-02-14 PROCEDURE — 65270000029 HC RM PRIVATE

## 2019-02-14 PROCEDURE — A4300 CATH IMPL VASC ACCESS PORTAL: HCPCS

## 2019-02-14 PROCEDURE — 85027 COMPLETE CBC AUTOMATED: CPT

## 2019-02-14 PROCEDURE — 77030014125 HC TY EPDRL BBMI -B: Performed by: ANESTHESIOLOGY

## 2019-02-14 PROCEDURE — 74011250636 HC RX REV CODE- 250/636: Performed by: ADVANCED PRACTICE MIDWIFE

## 2019-02-14 PROCEDURE — 77030011943

## 2019-02-14 PROCEDURE — 75410000002 HC LABOR FEE PER 1 HR

## 2019-02-14 PROCEDURE — 74011000250 HC RX REV CODE- 250: Performed by: ANESTHESIOLOGY

## 2019-02-14 PROCEDURE — 74011250636 HC RX REV CODE- 250/636: Performed by: ANESTHESIOLOGY

## 2019-02-14 PROCEDURE — 3E033VJ INTRODUCTION OF OTHER HORMONE INTO PERIPHERAL VEIN, PERCUTANEOUS APPROACH: ICD-10-PCS | Performed by: OBSTETRICS & GYNECOLOGY

## 2019-02-14 PROCEDURE — 74011000250 HC RX REV CODE- 250

## 2019-02-14 PROCEDURE — 3E0R3BZ INTRODUCTION OF ANESTHETIC AGENT INTO SPINAL CANAL, PERCUTANEOUS APPROACH: ICD-10-PCS | Performed by: ANESTHESIOLOGY

## 2019-02-14 RX ORDER — NALOXONE HYDROCHLORIDE 0.4 MG/ML
0.4 INJECTION, SOLUTION INTRAMUSCULAR; INTRAVENOUS; SUBCUTANEOUS AS NEEDED
Status: DISCONTINUED | OUTPATIENT
Start: 2019-02-14 | End: 2019-02-15

## 2019-02-14 RX ORDER — LIDOCAINE HYDROCHLORIDE AND EPINEPHRINE 15; 5 MG/ML; UG/ML
INJECTION, SOLUTION EPIDURAL AS NEEDED
Status: DISCONTINUED | OUTPATIENT
Start: 2019-02-14 | End: 2019-02-15 | Stop reason: HOSPADM

## 2019-02-14 RX ORDER — BUPIVACAINE HYDROCHLORIDE 2.5 MG/ML
INJECTION, SOLUTION EPIDURAL; INFILTRATION; INTRACAUDAL AS NEEDED
Status: DISCONTINUED | OUTPATIENT
Start: 2019-02-14 | End: 2019-02-15 | Stop reason: HOSPADM

## 2019-02-14 RX ORDER — BUPIVACAINE HYDROCHLORIDE 5 MG/ML
30 INJECTION, SOLUTION EPIDURAL; INTRACAUDAL AS NEEDED
Status: DISCONTINUED | OUTPATIENT
Start: 2019-02-14 | End: 2019-02-15

## 2019-02-14 RX ORDER — FENTANYL/BUPIVACAINE/NS/PF 2-1250MCG
1-16 PREFILLED PUMP RESERVOIR EPIDURAL CONTINUOUS
Status: DISCONTINUED | OUTPATIENT
Start: 2019-02-14 | End: 2019-02-15

## 2019-02-14 RX ORDER — ONDANSETRON 2 MG/ML
4 INJECTION INTRAMUSCULAR; INTRAVENOUS
Status: DISCONTINUED | OUTPATIENT
Start: 2019-02-14 | End: 2019-02-15

## 2019-02-14 RX ORDER — FENTANYL CITRATE 50 UG/ML
100 INJECTION, SOLUTION INTRAMUSCULAR; INTRAVENOUS ONCE
Status: COMPLETED | OUTPATIENT
Start: 2019-02-14 | End: 2019-02-15

## 2019-02-14 RX ORDER — SODIUM CHLORIDE, SODIUM LACTATE, POTASSIUM CHLORIDE, CALCIUM CHLORIDE 600; 310; 30; 20 MG/100ML; MG/100ML; MG/100ML; MG/100ML
125 INJECTION, SOLUTION INTRAVENOUS CONTINUOUS
Status: DISCONTINUED | OUTPATIENT
Start: 2019-02-14 | End: 2019-02-15

## 2019-02-14 RX ORDER — SODIUM CHLORIDE 0.9 % (FLUSH) 0.9 %
5-40 SYRINGE (ML) INJECTION EVERY 8 HOURS
Status: DISCONTINUED | OUTPATIENT
Start: 2019-02-14 | End: 2019-02-15

## 2019-02-14 RX ORDER — FENTANYL CITRATE 50 UG/ML
INJECTION, SOLUTION INTRAMUSCULAR; INTRAVENOUS AS NEEDED
Status: DISCONTINUED | OUTPATIENT
Start: 2019-02-14 | End: 2019-02-15 | Stop reason: HOSPADM

## 2019-02-14 RX ORDER — TERBUTALINE SULFATE 1 MG/ML
0.25 INJECTION SUBCUTANEOUS AS NEEDED
Status: DISCONTINUED | OUTPATIENT
Start: 2019-02-14 | End: 2019-02-15

## 2019-02-14 RX ORDER — OXYTOCIN/0.9 % SODIUM CHLORIDE 30/500 ML
0-25 PLASTIC BAG, INJECTION (ML) INTRAVENOUS
Status: DISCONTINUED | OUTPATIENT
Start: 2019-02-14 | End: 2019-02-15

## 2019-02-14 RX ORDER — EPHEDRINE SULFATE 50 MG/ML
12.5 INJECTION, SOLUTION INTRAVENOUS
Status: COMPLETED | OUTPATIENT
Start: 2019-02-14 | End: 2019-02-14

## 2019-02-14 RX ORDER — NALBUPHINE HYDROCHLORIDE 10 MG/ML
10 INJECTION, SOLUTION INTRAMUSCULAR; INTRAVENOUS; SUBCUTANEOUS
Status: DISCONTINUED | OUTPATIENT
Start: 2019-02-14 | End: 2019-02-15

## 2019-02-14 RX ORDER — SODIUM CHLORIDE 0.9 % (FLUSH) 0.9 %
5-40 SYRINGE (ML) INJECTION AS NEEDED
Status: DISCONTINUED | OUTPATIENT
Start: 2019-02-14 | End: 2019-02-15

## 2019-02-14 RX ADMIN — OXYTOCIN-SODIUM CHLORIDE 0.9% IV SOLN 30 UNIT/500ML 12 MILLI-UNITS/MIN: 30-0.9/5 SOLUTION at 14:11

## 2019-02-14 RX ADMIN — SODIUM CHLORIDE, SODIUM LACTATE, POTASSIUM CHLORIDE, AND CALCIUM CHLORIDE 500 ML: 600; 310; 30; 20 INJECTION, SOLUTION INTRAVENOUS at 21:57

## 2019-02-14 RX ADMIN — SODIUM CHLORIDE, SODIUM LACTATE, POTASSIUM CHLORIDE, AND CALCIUM CHLORIDE 1000 ML: 600; 310; 30; 20 INJECTION, SOLUTION INTRAVENOUS at 17:00

## 2019-02-14 RX ADMIN — EPHEDRINE SULFATE 12.5 MG: 50 INJECTION, SOLUTION INTRAVENOUS at 19:07

## 2019-02-14 RX ADMIN — Medication 10 ML/HR: at 22:42

## 2019-02-14 RX ADMIN — BUPIVACAINE HYDROCHLORIDE 2 ML: 2.5 INJECTION, SOLUTION EPIDURAL; INFILTRATION; INTRACAUDAL at 17:24

## 2019-02-14 RX ADMIN — SODIUM CHLORIDE, SODIUM LACTATE, POTASSIUM CHLORIDE, AND CALCIUM CHLORIDE 125 ML/HR: 600; 310; 30; 20 INJECTION, SOLUTION INTRAVENOUS at 07:19

## 2019-02-14 RX ADMIN — OXYTOCIN-SODIUM CHLORIDE 0.9% IV SOLN 30 UNIT/500ML 14 MILLI-UNITS/MIN: 30-0.9/5 SOLUTION at 14:54

## 2019-02-14 RX ADMIN — BUPIVACAINE HYDROCHLORIDE 2 ML: 2.5 INJECTION, SOLUTION EPIDURAL; INFILTRATION; INTRACAUDAL at 17:25

## 2019-02-14 RX ADMIN — FENTANYL CITRATE 100 MCG: 50 INJECTION, SOLUTION INTRAMUSCULAR; INTRAVENOUS at 17:22

## 2019-02-14 RX ADMIN — SODIUM CHLORIDE, SODIUM LACTATE, POTASSIUM CHLORIDE, AND CALCIUM CHLORIDE 125 ML/HR: 600; 310; 30; 20 INJECTION, SOLUTION INTRAVENOUS at 12:59

## 2019-02-14 RX ADMIN — LIDOCAINE HYDROCHLORIDE AND EPINEPHRINE 3 ML: 15; 5 INJECTION, SOLUTION EPIDURAL at 17:23

## 2019-02-14 RX ADMIN — OXYTOCIN-SODIUM CHLORIDE 0.9% IV SOLN 30 UNIT/500ML 1 MILLI-UNITS/MIN: 30-0.9/5 SOLUTION at 07:16

## 2019-02-14 RX ADMIN — LIDOCAINE HYDROCHLORIDE AND EPINEPHRINE 2 ML: 15; 5 INJECTION, SOLUTION EPIDURAL at 17:21

## 2019-02-14 RX ADMIN — Medication 10 ML/HR: at 17:42

## 2019-02-14 RX ADMIN — SODIUM CHLORIDE, SODIUM LACTATE, POTASSIUM CHLORIDE, AND CALCIUM CHLORIDE 125 ML/HR: 600; 310; 30; 20 INJECTION, SOLUTION INTRAVENOUS at 17:45

## 2019-02-14 NOTE — ANESTHESIA PREPROCEDURE EVALUATION
Anesthetic History No history of anesthetic complications Review of Systems / Medical History Patient summary reviewed, nursing notes reviewed and pertinent labs reviewed Pulmonary Within defined limits Neuro/Psych Within defined limits Cardiovascular Within defined limits GI/Hepatic/Renal 
Within defined limits Endo/Other Morbid obesity and arthritis Other Findings Physical Exam 
 
Airway Mallampati: II 
TM Distance: > 6 cm Neck ROM: normal range of motion Mouth opening: Normal 
 
 Cardiovascular Regular rate and rhythm,  S1 and S2 normal,  no murmur, click, rub, or gallop Dental 
No notable dental hx Pulmonary Breath sounds clear to auscultation Abdominal 
GI exam deferred Other Findings Anesthetic Plan ASA: 3 Anesthesia type: epidural 
 
 
 
 
Induction: Intravenous Anesthetic plan and risks discussed with: Patient

## 2019-02-14 NOTE — ROUTINE PROCESS
1555: Bedside shift change report given to [address] [place] [address] Temple (oncoming nurse) by Jos Bryan. Emmy Kee RN (offgoing nurse). Report included the following information SBAR, Intake/Output, MAR and Recent Results. 1610: patient swaying at side of bed using nitrous and coping well; difficult to monitor. 1640: MAGEN Musa CNM at bedside, SVE 5-6/80/-2. Patient placed in knee-chest. 
 
1700: patient requesting epidural at this time. IVF bolus started. 1732: Dr. Galvan at bedside for epidural placement. 1743: spinning babies L side. 1803: spinning babies R side.

## 2019-02-14 NOTE — PROGRESS NOTES
Labor Progress Note Patient seen, fetal heart rate and contraction pattern evaluated, patient examined. Visit Vitals /89 Pulse 85 Temp 97.9 °F (36.6 °C) Resp 16 Ht 5' 1\" (1.549 m) Wt 212 lb (96.2 kg) LMP 05/16/2018 (Exact Date) Breastfeeding? No  
BMI 40.06 kg/m² Physical Exam: 
Cervical Exam:  5/80/-2 Membranes:  clear fluid Uterine Activity: every 2-4 Fetal Heart Rate: category one Assessment/Plan: 
Reassuring fetal status, Labor  Progressing normally, Continue plan for vaginal delivery
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
## 2019-02-14 NOTE — PROGRESS NOTES
6250:  Bedside report received from Delores Winchester RN. Will assume care. 0800:  Discussed with mother her plan for feeding her baby. Reviewed the benefits and management of exclusive breast milk feeding as well as the importance of latching within the first hour after delivery. Instruction provided on how to recognize hunger cues and initiate breast feeding in response to those cues. Patient confirms breast milk feed exclusively as intended feeding method at this time. 0848: Patient plans for placenta encapsulation. Release of placenta form signed. 0935: Encouraged patient to walk, labor upright and use birthing ball. Demonstrated upright labor positions, gentle stretch exercises and birthing ball use. Patient verbalized understanding. 1130: Andriy Gongora at bedside to assess and discuss continued plan of care. 1232:  Andriy Gongora at bedside. SVE: 4/80/-2. AROM: large, clear fluid, no odor. Patient using nitrous through AROM, pt. and baby tolerated well. 1545: Bedside and Verbal shift change report given to LAILA Dickinson RN (oncoming nurse) by SEBASTIAN Ingram (offgoing nurse). Report included the following information SBAR, MAR and Recent Results.

## 2019-02-14 NOTE — PROGRESS NOTES
2/14/2019  6:11 AM Patient arrived here from home. Here for scheduled induction for dates. @4537 Patient would like to start using nitrous oxide. Asked if patient was having any pain, she denies but states she has lots of anxiety and would like to use it. Compromise reached to use for IV start and then stop and restart when she needs help coping with contractions. Patient agreeable with this POC. @4471 Nitrous oxide initiated

## 2019-02-14 NOTE — PROGRESS NOTES
Labor Progress Note Patient seen, fetal heart rate and contraction pattern evaluated, patient examined. Patient requesting exam and SROM Visit Vitals /75 (BP 1 Location: Left arm, BP Patient Position: At rest) Pulse 86 Temp 98 °F (36.7 °C) Resp 16 Ht 5' 1\" (1.549 m) Wt 212 lb (96.2 kg) LMP 05/16/2018 (Exact Date) Breastfeeding? No  
BMI 40.06 kg/m² Physical Exam: 
Cervical Exam:  4/80/-2 Membranes:  Artificial Rupture of Membranes; Amniotic Fluid: large amount of clear fluid Uterine Activity: every 2-4 Fetal Heart Rate: category one Assessment/Plan: 
Reassuring fetal status, Labor  Progressing normally Continue expectant management, Continue plan for vaginal delivery

## 2019-02-15 PROBLEM — Z34.90 ENCOUNTER FOR ELECTIVE INDUCTION OF LABOR: Status: ACTIVE | Noted: 2019-02-15

## 2019-02-15 PROCEDURE — 74011000258 HC RX REV CODE- 258: Performed by: ADVANCED PRACTICE MIDWIFE

## 2019-02-15 PROCEDURE — 77030034849

## 2019-02-15 PROCEDURE — 74011250636 HC RX REV CODE- 250/636

## 2019-02-15 PROCEDURE — 65410000002 HC RM PRIVATE OB

## 2019-02-15 PROCEDURE — 75410000000 HC DELIVERY VAGINAL/SINGLE

## 2019-02-15 PROCEDURE — 87186 SC STD MICRODIL/AGAR DIL: CPT

## 2019-02-15 PROCEDURE — 74011250636 HC RX REV CODE- 250/636: Performed by: ADVANCED PRACTICE MIDWIFE

## 2019-02-15 PROCEDURE — 75410000003 HC RECOV DEL/VAG/CSECN EA 0.5 HR

## 2019-02-15 PROCEDURE — 87077 CULTURE AEROBIC IDENTIFY: CPT

## 2019-02-15 PROCEDURE — 75410000002 HC LABOR FEE PER 1 HR

## 2019-02-15 PROCEDURE — 87205 SMEAR GRAM STAIN: CPT

## 2019-02-15 PROCEDURE — 74011250637 HC RX REV CODE- 250/637: Performed by: ADVANCED PRACTICE MIDWIFE

## 2019-02-15 PROCEDURE — 74011000250 HC RX REV CODE- 250: Performed by: ANESTHESIOLOGY

## 2019-02-15 PROCEDURE — 76060000078 HC EPIDURAL ANESTHESIA

## 2019-02-15 PROCEDURE — 0HQ9XZZ REPAIR PERINEUM SKIN, EXTERNAL APPROACH: ICD-10-PCS | Performed by: ADVANCED PRACTICE MIDWIFE

## 2019-02-15 RX ORDER — SODIUM CHLORIDE 0.9 % (FLUSH) 0.9 %
5-40 SYRINGE (ML) INJECTION AS NEEDED
Status: DISCONTINUED | OUTPATIENT
Start: 2019-02-15 | End: 2019-02-17 | Stop reason: HOSPADM

## 2019-02-15 RX ORDER — ZOLPIDEM TARTRATE 5 MG/1
5 TABLET ORAL
Status: DISCONTINUED | OUTPATIENT
Start: 2019-02-15 | End: 2019-02-17 | Stop reason: HOSPADM

## 2019-02-15 RX ORDER — ACETAMINOPHEN 10 MG/ML
1000 INJECTION, SOLUTION INTRAVENOUS
Status: COMPLETED | OUTPATIENT
Start: 2019-02-15 | End: 2019-02-15

## 2019-02-15 RX ORDER — OXYTOCIN/0.9 % SODIUM CHLORIDE 30/500 ML
PLASTIC BAG, INJECTION (ML) INTRAVENOUS
Status: DISCONTINUED
Start: 2019-02-15 | End: 2019-02-15

## 2019-02-15 RX ORDER — GENTAMICIN SULFATE 100 MG/50ML
100 INJECTION, SOLUTION INTRAVENOUS ONCE
Status: COMPLETED | OUTPATIENT
Start: 2019-02-15 | End: 2019-02-15

## 2019-02-15 RX ORDER — HYDROCORTISONE 1 %
CREAM (GRAM) TOPICAL AS NEEDED
Status: DISCONTINUED | OUTPATIENT
Start: 2019-02-15 | End: 2019-02-17 | Stop reason: HOSPADM

## 2019-02-15 RX ORDER — FENTANYL CITRATE 50 UG/ML
INJECTION, SOLUTION INTRAMUSCULAR; INTRAVENOUS
Status: COMPLETED
Start: 2019-02-15 | End: 2019-02-15

## 2019-02-15 RX ORDER — HYDROCORTISONE ACETATE PRAMOXINE HCL 2.5; 1 G/100G; G/100G
CREAM TOPICAL AS NEEDED
Status: DISCONTINUED | OUTPATIENT
Start: 2019-02-15 | End: 2019-02-17 | Stop reason: HOSPADM

## 2019-02-15 RX ORDER — IBUPROFEN 400 MG/1
800 TABLET ORAL EVERY 8 HOURS
Status: DISCONTINUED | OUTPATIENT
Start: 2019-02-15 | End: 2019-02-17 | Stop reason: HOSPADM

## 2019-02-15 RX ORDER — SIMETHICONE 80 MG
80 TABLET,CHEWABLE ORAL
Status: DISCONTINUED | OUTPATIENT
Start: 2019-02-15 | End: 2019-02-17 | Stop reason: HOSPADM

## 2019-02-15 RX ORDER — DIPHENHYDRAMINE HYDROCHLORIDE 50 MG/ML
25 INJECTION, SOLUTION INTRAMUSCULAR; INTRAVENOUS ONCE
Status: COMPLETED | OUTPATIENT
Start: 2019-02-15 | End: 2019-02-15

## 2019-02-15 RX ORDER — SODIUM CHLORIDE 0.9 % (FLUSH) 0.9 %
5-40 SYRINGE (ML) INJECTION EVERY 8 HOURS
Status: DISCONTINUED | OUTPATIENT
Start: 2019-02-15 | End: 2019-02-17 | Stop reason: HOSPADM

## 2019-02-15 RX ORDER — FENTANYL CITRATE 50 UG/ML
50 INJECTION, SOLUTION INTRAMUSCULAR; INTRAVENOUS
Status: DISCONTINUED | OUTPATIENT
Start: 2019-02-15 | End: 2019-02-15

## 2019-02-15 RX ORDER — OXYTOCIN/RINGER'S LACTATE 20/1000 ML
999 PLASTIC BAG, INJECTION (ML) INTRAVENOUS AS NEEDED
Status: DISCONTINUED | OUTPATIENT
Start: 2019-02-15 | End: 2019-02-17 | Stop reason: HOSPADM

## 2019-02-15 RX ORDER — AMMONIA 15 % (W/V)
1 AMPUL (EA) INHALATION AS NEEDED
Status: DISCONTINUED | OUTPATIENT
Start: 2019-02-15 | End: 2019-02-17 | Stop reason: HOSPADM

## 2019-02-15 RX ORDER — ACETAMINOPHEN 325 MG/1
650 TABLET ORAL
Status: DISCONTINUED | OUTPATIENT
Start: 2019-02-15 | End: 2019-02-17 | Stop reason: HOSPADM

## 2019-02-15 RX ORDER — DOCUSATE SODIUM 100 MG/1
100 CAPSULE, LIQUID FILLED ORAL
Status: DISCONTINUED | OUTPATIENT
Start: 2019-02-15 | End: 2019-02-17 | Stop reason: HOSPADM

## 2019-02-15 RX ORDER — SODIUM CHLORIDE, SODIUM LACTATE, POTASSIUM CHLORIDE, CALCIUM CHLORIDE 600; 310; 30; 20 MG/100ML; MG/100ML; MG/100ML; MG/100ML
125 INJECTION, SOLUTION INTRAVENOUS CONTINUOUS
Status: DISCONTINUED | OUTPATIENT
Start: 2019-02-15 | End: 2019-02-15

## 2019-02-15 RX ADMIN — ACETAMINOPHEN 1000 MG: 10 INJECTION, SOLUTION INTRAVENOUS at 06:08

## 2019-02-15 RX ADMIN — Medication 10 ML: at 10:45

## 2019-02-15 RX ADMIN — AMPICILLIN 2 G: 2 INJECTION, POWDER, FOR SOLUTION INTRAVENOUS at 01:15

## 2019-02-15 RX ADMIN — FENTANYL CITRATE 100 MCG: 50 INJECTION, SOLUTION INTRAMUSCULAR; INTRAVENOUS at 01:14

## 2019-02-15 RX ADMIN — DIPHENHYDRAMINE HYDROCHLORIDE 25 MG: 50 INJECTION, SOLUTION INTRAMUSCULAR; INTRAVENOUS at 01:15

## 2019-02-15 RX ADMIN — GENTAMICIN SULFATE 100 MG: 100 INJECTION, SOLUTION INTRAVENOUS at 06:46

## 2019-02-15 RX ADMIN — SODIUM CHLORIDE 1 G: 900 INJECTION, SOLUTION INTRAVENOUS at 09:24

## 2019-02-15 RX ADMIN — AMPICILLIN SODIUM 1 G: 1 INJECTION, POWDER, FOR SOLUTION INTRAMUSCULAR; INTRAVENOUS at 05:15

## 2019-02-15 RX ADMIN — Medication 10 ML/HR: at 03:44

## 2019-02-15 RX ADMIN — IBUPROFEN 800 MG: 400 TABLET ORAL at 19:03

## 2019-02-15 RX ADMIN — IBUPROFEN 800 MG: 400 TABLET ORAL at 10:34

## 2019-02-15 NOTE — PROGRESS NOTES
Labor Progress Note Patient seen, fetal heart rate and contraction pattern evaluated. Visit Vitals /71 Pulse 85 Temp 98.4 °F (36.9 °C) Resp 14 Ht 5' 1\" (1.549 m) Wt 212 lb (96.2 kg) LMP 05/16/2018 (Exact Date) Breastfeeding? No  
BMI 40.06 kg/m² Physical Exam: 
Cervical Exam: deferred until feeling pressures Membranes:  ROM Uterine Activity: 2-4 Fetal Heart Rate: Cat 1 Assessment/Plan: 
straight cath at 2200 and TONE Peraza CNM

## 2019-02-15 NOTE — L&D DELIVERY NOTE
Delivery Note    Obstetrician:  Derek Obregon CNM    Assistant: none    Pre-Delivery Diagnosis: Term pregnancy    Post-Delivery Diagnosis: Living  infant(s) and Female    Intrapartum Event: None    Procedure: Spontaneous vaginal delivery    Epidural: YES    Monitor:  Fetal Heart Tones - External and Uterine Contractions - External    Indications for instrumental delivery: none    Estimated Blood Loss:     Episiotomy: none    Laceration(s):  1st degree, good hemostasis     Laceration(s) repair: YES    Presentation: Cephalic    Fetal Description: meadows    Fetal Position: Right Occiput Anterior    Birth Weight:     Birth Length:     Apgar - One Minute: 8    Apgar - Five Minutes: 9    Umbilical Cord: 3 vessels present    Specimens: None           Complications:  none           Cord Blood Results:   Information for the patient's :  Lebron Chung [[id number]]   No results found for: PCTABR, PCTDIG, BILI, ABORH    Prenatal Labs:     Lab Results   Component Value Date/Time    HBsAg, External Negative 08/15/2018    HIV, External Non Reactive 2018    Rubella, External Non Immune 08/15/2018    RPR, External Non Reactive 08/15/2018    Gonorrhea, External Negative 2018    Chlamydia, External Negative 2018    GrBStrep, External Negative 2019        Attending Attestation: I was present and scrubbed for the entire procedure   live female over IP. vtx delivered in NIMISHA position. Infant placed on maternal abdomen with spontaneous vigorous crying. 3 vessel cord clamped x 2 by DIONM then cut by FOB after cessations of pulsations. Spontaneous delivery of intact placenta.   ml       Signed By:  Derek Obregon CNM     February 15, 2019

## 2019-02-15 NOTE — PROGRESS NOTES
1935 Bedside shift change report given to MARISSA Stroud RN (oncoming nurse) by Russ Reyes RN (offgoing nurse). Report included the following information SBAR, Kardex, Intake/Output, MAR and Recent Results. 2030 Patient turned on right side. 2200 SVE 6-7/90/-2. Straight cath'd for 100cc of urine. Patient turned on left side. 2245 Patient turned on right side. 2350 Patient feeling contractions again. Bolus button pressed multiple times. Edward DOBBS at bedside. SVE unchange with cervix now edematous. Patient on left side. 0361 Dr. Emre Caruso at bedside for epidural dose. 0100 Urban catheter placed. Patient on right side. 1668 Patient on left side. 0320 SVE 8-9/90/0. Patient on right side. 0410 SVE ant lip/100/0. Patient on left side. 0500 Edward ASHLEYM at bedside. SVE 10/100/0. 
 
8504 Start pushing. 0530 Nurse continuously at bedside pushing with patient, , occasional variable noted with contractions that resolves spontaneously. 0600 IV tylenol given for rising temp. Edward at bedside. Nurse continuously at bedside pushing with patient, , occasional variable noted with contractions that resolves spontaneously. 2960 Patient states she \"can't do this\". Has stopped pushing at this time and is becoming visibly anxious and overwhelmed. Edward called to bedside. 0630 Nurse continuously at bedside pushing with patient, , occasional variable noted with contractions that resolves spontaneously. 0700 Nurse continuously at bedside pushing with patient, , occasional variable noted with contractions that resolves spontaneously. 
 
0702 Pushing with squat bar. 26 Dr. Kam Davis at bedside evaluating pushing. 0730 Nurse continuously at bedside pushing with patient, , occasional variable noted with contractions that resolves spontaneously.  
 
0740 Bedside and Verbal shift change report given to Aramis Diaz RN ([facility] nurse) by Jose D Wilkes RN (offgoing nurse). Report included the following information SBAR, Kardex, Intake/Output, MAR and Recent Results.

## 2019-02-15 NOTE — PROGRESS NOTES
Labor Progress Note Patient seen, fetal heart rate and contraction pattern evaluated, patient examined. Visit Vitals /69 Pulse 86 Temp 98.4 °F (36.9 °C) Resp 14 Ht 5' 1\" (1.549 m) Wt 212 lb (96.2 kg) LMP 2018 (Exact Date) Breastfeeding? No  
BMI 40.06 kg/m² Physical Exam: 
Cervical Exam:  6-7/edematous/-2 Membranes:  clear Uterine Activity: readjusted toco, 2-3 by palpaiton Fetal Heart Rate: Cat 1 - reactive 1 hour ago Pitocin 20 miliuntis Assessment/Plan: 
pt may have redose of epidural   
Antibiotics for multiple checks prophlactically, pt restless and anxious may have IV benadryl 25mg to help rest, position changes to facilitate cervical change. Discussed lack of cervical change at this time, and plan to rest pt to decrease anxiety and help her to relax and position changes to facilitate change. If no cervical change with next exam  seems likely, pt aware and in agreement. [facility] aware of [facility] and potential need for section in the furture Frannie Peck CNM \

## 2019-02-15 NOTE — ROUTINE PROCESS
TRANSFER - IN REPORT: 
 
Verbal report received from Gibson Cheung RN(name) on Rommel Ambriz  being received from L&D(unit) for routine progression of care Report consisted of patients Situation, Background, Assessment and  
Recommendations(SBAR). Information from the following report(s) SBAR, Kardex, Procedure Summary, Intake/Output, MAR and Recent Results was reviewed with the receiving nurse. Opportunity for questions and clarification was provided. Assessment completed upon patients arrival to unit and care assumed.

## 2019-02-15 NOTE — PROGRESS NOTES
Labor Progress Note Patient seen at [facility], fetal heart rate and contraction pattern evaluated, patient examined. Visit Vitals /79 Pulse (!) 110 Temp (!) 102 °F (38.9 °C) Resp 14 Ht 5' 1\" (1.549 m) Wt 212 lb (96.2 kg) LMP 05/16/2018 (Exact Date) SpO2 98% Breastfeeding? No  
BMI 40.06 kg/m² Physical Exam: 
Cervical Exam:  Lip at 415 per RN, now feeling pressure and complete Membranes:  clear fluid without odor Uterine Activity: 2-3 Fetal Heart Rate: increasing baseline due to elevations in temp Assessment/Plan: 
pt able to move fetal head with push, plan for IV tylenol and continue pushing temp 101- Temp at 0610- IV tylenol just arriving from pharmacy- temp 102 with maternal pushing efforts 's with variabiliy, caput forming with pushing fetal skull not decending well, pt has strong urge to push and much more effective with directed pushing. IV Pinkie Leely ordered to be started to due fever- will reassess after IV tylenol for temp and fetal decent. [facility] at desk and aware. Glernoy Alexandra CNM

## 2019-02-15 NOTE — ROUTINE PROCESS
TRANSFER - IN REPORT: 
 
Verbal report received from Reyes Thakur RN(name) on Forbes Riedel  being received from L&D(unit) for routine progression of care  Infant report received from Leatha Serrato. Report consisted of patients Situation, Background, Assessment and  
Recommendations(SBAR). Information from the following report(s) SBAR, Kardex, Procedure Summary, Intake/Output, MAR and Recent Results was reviewed with the receiving nurse. Opportunity for questions and clarification was provided. Assessment completed upon patients arrival to unit and care assumed.

## 2019-02-15 NOTE — PROGRESS NOTES
6684:  Bedside and Verbal shift change report given to LEONARDO Kohler RN (oncoming nurse) by MAURICIO Stroud RN (offgoing nurse). Report included the following information SBAR, Kardex, Intake/Output, MAR and Recent Results. 5570:  Dr. Cristóbal Purdy at bedside as second set of eyes with [facility] as pt is pushing with UC's. Nurse and provider are at the bedside continuously with pushing and assessing FHR pattern. 7480:  Delivered viable female infant  per [facility], no episiotomy, two 1st degree perineal lacerations, [facility] states no repair needed. 0830:  Discussed with mother her plan for feeding her baby. Reviewed the benefits and management of exclusive breast milk feeding as well as the importance of latching within the first hour after delivery. Instruction provided on how to recognize hunger cues and initiate breast feeding in response to those cues. Patient confirms breast milk feed exclusively as intended feeding method at this time. 0915:  Clarified with [facility] antibiotic orders. 1045:  IV saline locked and flushed. 1100:  Transferred to [address] [place] [address] via wheelchair accompanied by  and sister. Mother holding infant on transfer. 1110:  TRANSFER - OUT REPORT: 
 
Verbal report given to Ugo Benedict RN(name) on Irma Barth  being transferred to [place] [address](unit) for routine progression of care Report consisted of patients Situation, Background, Assessment and  
Recommendations(SBAR). Information from the following report(s) SBAR, Kardex, Intake/Output, MAR and Recent Results was reviewed with the receiving nurse. Lines:  
Peripheral IV 19 Anterior; Left Hand (Active) Site Assessment Clean, dry, & intact 2/15/2019  2:45 AM  
Phlebitis Assessment 0 2/15/2019  2:45 AM  
Infiltration Assessment 0 2/15/2019  2:45 AM  
Dressing Status Clean, dry, & intact 2/15/2019  2:45 AM  
Dressing Type Tape;Transparent 2/15/2019  2:45 AM  
 Hub Color/Line Status Pink; Infusing 2/14/2019  3:58 PM  
  
 
Opportunity for questions and clarification was provided. Patient transported with: 
 Registered Nurse

## 2019-02-15 NOTE — ANESTHESIA POSTPROCEDURE EVALUATION
Post-Anesthesia Evaluation and Assessment Patient: Austen Chapin MRN: [id number]  SSN: [id number] YOB: 1995  Age: 24 y.o. Sex: female I have evaluated the patient and they are stable and ready for discharge from the PACU. Cardiovascular Function/Vital Signs Visit Vitals /64 (BP 1 Location: Right arm, BP Patient Position: At rest) Pulse 88 Temp 36.7 °C (98 °F) Resp 20 Ht 5' 1\" (1.549 m) Wt 96.2 kg (212 lb) SpO2 (!) 84% Breastfeeding? Unknown BMI 40.06 kg/m² Patient is status post * No anesthesia type entered * anesthesia for * No procedures listed *. Nausea/Vomiting: None Postoperative hydration reviewed and adequate. Pain: 
Pain Scale 1: Numeric (0 - 10) (02/15/19 0916) Pain Intensity 1: 0 (02/15/19 0916) Managed Neurological Status:  
Neuro (WDL): Within Defined Limits (02/15/19 0818) At baseline Mental Status, Level of Consciousness: Alert and  oriented to person, place, and time Pulmonary Status:  
O2 Device: Room air (02/15/19 0818) Adequate oxygenation and airway patent Complications related to anesthesia: None Post-anesthesia assessment completed. No concerns Signed By: Willard Morton MD   
 February 15, 2019 * No procedures listed *. 
 
<BSHSIANPOST> Visit Vitals /64 (BP 1 Location: Right arm, BP Patient Position: At rest) Pulse 88 Temp 36.7 °C (98 °F) Resp 20 Ht 5' 1\" (1.549 m) Wt 96.2 kg (212 lb) SpO2 (!) 84% Breastfeeding? Unknown BMI 40.06 kg/m²

## 2019-02-16 PROCEDURE — 74011250637 HC RX REV CODE- 250/637: Performed by: ADVANCED PRACTICE MIDWIFE

## 2019-02-16 PROCEDURE — 65410000002 HC RM PRIVATE OB

## 2019-02-16 RX ORDER — SERTRALINE HYDROCHLORIDE 50 MG/1
50 TABLET, FILM COATED ORAL DAILY
Status: DISCONTINUED | OUTPATIENT
Start: 2019-02-16 | End: 2019-02-17 | Stop reason: HOSPADM

## 2019-02-16 RX ADMIN — SERTRALINE 50 MG: 50 TABLET, FILM COATED ORAL at 15:59

## 2019-02-16 RX ADMIN — IBUPROFEN 800 MG: 400 TABLET ORAL at 11:31

## 2019-02-16 RX ADMIN — IBUPROFEN 800 MG: 400 TABLET ORAL at 20:00

## 2019-02-16 RX ADMIN — IBUPROFEN 800 MG: 400 TABLET ORAL at 02:44

## 2019-02-16 NOTE — LACTATION NOTE
This note was copied from a baby's chart. Baby nursing well today,  deep latch obtained, mother is comfortable, baby feeding vigorously with rhythmic suck, swallow, breathe pattern, audible swallowing, and evident milk transfer, both breasts offered, baby is asleep following feeding. Mom has no questions for the lactation consultant today. Will call out for assistance if needed.

## 2019-02-16 NOTE — CONSULTS
PSYCHIATRIC PROGRESS NOTE         Patient Name  Otoniel Bright   Date of Birth 1995   [facility] [id number]   Medical Record Number  [id number]      Age  24 y.o. PCP None   Admit date:  2/14/2019    Room Number  345/01  @ [facility]   Date of Service  2/16/2019             E & M PROGRESS NOTE:         HISTORY       CC/HISTORY OF PRESENT ILLNESS/INTERVAL HISTORY:  (reviewed/updated 2/16/2019). per initial evaluation:       Otoniel Bright presents/reports/evidences the following emotional symptoms today, 2/16/2019:  depression. The above symptoms have been present for few weeks . These symptoms are of mild  severity. The symptoms are intermittent/ fleeting in nature. Additional symptomatology include depression worse and difficulty sleeping. She stated she used to be on Effexor and she was off that , she has history of borderline personality and she received DBT before in the past , she has been evaluated for [facility] therapy for depression and she is interested to continue her Zoloft , she denied suicidal or homicidal ideation and denied psychotic features       SIDE EFFECTS: (reviewed/updated 2/16/2019)  None reported or admitted to. No noted toxicity with use of Depakote/Tegretol/lithium/Clozaril/TCAs   ALLERGIES:(reviewed/updated 2/16/2019)  No Known Allergies   MEDICATIONS PRIOR TO ADMISSION:(reviewed/updated 2/16/2019)  Medications Prior to Admission   Medication Sig    melatonin 1 mg tablet Take 1 mg by mouth.  calcium carbonate (TUMS) 200 mg calcium (500 mg) chew Take 1 Tab by mouth daily.  ascorbate calcium (VITAMIN C PO) Take  by mouth.  multivit with min-folic acid (ADULT MULTIVITAMIN GUMMIES) 200 mcg chew Take  by mouth.  venlafaxine-SR (EFFEXOR-XR) 37.5 mg capsule Take 1 Cap by mouth daily.       PAST MEDICAL HISTORY: Past medical history from the initial psychiatric evaluation has been reviewed (reviewed/updated 2/16/2019) with no additional updates (I asked patient and no additional past medical history provided). Past Medical History:   Diagnosis Date    Anxiety     Borderline personality disorder ([address]     Degenerative disc disease at L5-S1 level     Depression     PCOS (polycystic ovarian syndrome)     Polycystic disease, ovaries      Past Surgical History:   Procedure Laterality Date    BREAST SURGERY PROCEDURE UNLISTED  2017    Augmentation    HX BREAST AUGMENTATION  07/2017      SOCIAL HISTORY: Social history from the initial psychiatric evaluation has been reviewed (reviewed/updated 2/16/2019) with no additional updates (I asked patient and no additional social history provided).    Social History     Socioeconomic History    Marital status:      Spouse name: Not on file    Number of children: Not on file    Years of education: Not on file    Highest education level: Not on file   Social Needs    Financial resource strain: Not on file    Food insecurity - worry: Not on file    Food insecurity - inability: Not on file   [employer] needs - medical: Not on file   [employer] needs - non-medical: Not on file   Occupational History    Not on file   Tobacco Use    Smoking status: Never Smoker    Smokeless tobacco: Never Used   Substance and Sexual Activity    Alcohol use: No     Alcohol/week: 0.0 oz     Comment: socially    Drug use: Yes     Types: Marijuana     Comment: couple of months ago    Sexual activity: Yes     Partners: Male     Birth control/protection: None   Other Topics Concern     Service Not Asked    Blood Transfusions Not Asked    Caffeine Concern Not Asked    Occupational Exposure Not Asked    Hobby Hazards Not Asked    Sleep Concern Not Asked    Stress Concern Not Asked    Weight Concern Not Asked    Special Diet Not Asked    Back Care Not Asked    Exercise Not Asked    Bike Helmet Not Asked    Seat Belt Not Asked    Self-Exams Not Asked   Social History Narrative    Partner - Steve      FAMILY HISTORY: Family history from the initial psychiatric evaluation has been reviewed (reviewed/updated 2/16/2019) with no additional updates (I asked patient and no additional family history provided). Family History   Problem Relation Age of Onset    Cancer Mother         skin,thyroid,ovarian   [facility] Migraines Mother     Depression Mother     Anxiety Mother     Bipolar Disorder Mother     Other Father         prediabetes    Heart Disease Father     Hypertension Father     No Known Problems Sister     Other Brother         esbergers    Diabetes Paternal Uncle        REVIEW OF SYSTEMS: (reviewed/updated 2/16/2019)  Appetite:improved   Sleep: improved   All other Review of Systems:      History obtained from the patient         [facility] (MSE):    MSE FINDINGS ARE WITHIN NORMAL LIMITS (WNL) UNLESS OTHERWISE STATED BELOW. Orientation oriented to time, place and person   Vital Signs (BP,Pulse, Temp) See below (reviewed 2/16/2019); vital signs are WNL if not listed below.    Gait and Station Stable/steady, no ataxia   Abnormal Muscular Movements, Tone, and Behavior No EPS, no Tardive Dyskinesia, no abnormal muscular movements; wnl tone   Relations cooperative   General Appearance:  age appropriate   Language No aphasia or dysarthria   Speech:  normal pitch and normal volume   Thought Processes logical, wnl rate of thoughts, good abstract reasoning and computation   Thought Associations logical   Thought Content free of delusions and free of hallucinations   Suicidal Ideations no plan  and no intention   Homicidal Ideations no plan  and no intention   Mood:  depressed   Affect:  full range   Memory recent  adequate   Memory remote:  adequate   Concentration/Attention:  adequate   Fund of Knowledge average   Insight:  good   Reliability fair   Judgment:  good        VITALS:     Patient Vitals for the past 24 hrs:   Temp Pulse Resp BP   02/16/19 1722 97.9 °F (36.6 °C) 74 16 122/83 02/16/19 0934 97.7 °F (36.5 °C) 87 16 126/77   02/16/19 0230 98.2 °F (36.8 °C) 81 14 112/74   02/15/19 2120 98.3 °F (36.8 °C) 73 14 98/63            DATA     LABORATORY DATA:(reviewed/updated 2/16/2019)  No results found for this or any previous visit (from the past 24 hour(s)).   No results found for: VALF2, VALAC, VALP, VALPR, DS6, CRBAM, CRBAMP, CARB2, XCRBAM  No results found for: LITHM       MEDICATIONS     ALL MEDICATIONS:   Current Facility-Administered Medications   Medication Dose Route Frequency    sertraline (ZOLOFT) tablet 50 mg  50 mg Oral DAILY    sodium chloride (NS) flush 5-40 mL  5-40 mL IntraVENous Q8H    sodium chloride (NS) flush 5-40 mL  5-40 mL IntraVENous PRN    oxytocin (PITOCIN) 20 units/1000 ml LR  999 mL/hr IntraVENous PRN    oxytocin (PITOCIN) 20 units/1000 ml LR  999 mL/hr IntraVENous PRN    docusate sodium (COLACE) capsule 100 mg  100 mg Oral DAILY PRN    aluminum-magnesium hydroxide (MAALOX) oral suspension 30 mL  30 mL Oral PRN    simethicone (MYLICON) tablet 80 mg  80 mg Oral QID PRN    zolpidem (AMBIEN) tablet 5 mg  5 mg Oral QHS PRN    witch hazel-glycerin (TUCKS) 12.5-50 % pads 1 Pad  1 Pad PeriANAL PRN    hydrocortisone (CORTAID) 1 % cream   Topical PRN    modified lanolin (LANSINOH) ointment   Topical PRN    hydrocortisone-pramoxine (ANALPRAM-HC) 2.5-1 % (4g) cream   Topical PRN    pramoxine-hydrocortisone (ANALPRAM HC) cream   Topical PRN    ammonia aromatic 15% (W/V) ampule for inhalation  1 Ampule Inhalation PRN    ibuprofen (MOTRIN) tablet 800 mg  800 mg Oral Q8H    acetaminophen (TYLENOL) tablet 650 mg  650 mg Oral Q4H PRN    ceFAZolin (ANCEF) 1 g in 0.9% sodium chloride (MBP/ADV) 50 mL  1 g IntraVENous Q8H      SCHEDULED MEDICATIONS:   Current Facility-Administered Medications   Medication Dose Route Frequency    sertraline (ZOLOFT) tablet 50 mg  50 mg Oral DAILY    sodium chloride (NS) flush 5-40 mL  5-40 mL IntraVENous Q8H    ibuprofen (MOTRIN) tablet 800 mg  800 mg Oral Q8H    ceFAZolin (ANCEF) 1 g in 0.9% sodium chloride (MBP/ADV) 50 mL  1 g IntraVENous Q8H            ASSESSMENT & PLAN     The patient, Gildardo Guillory, is a 24 y.o.  female who presents at this time for treatment of the following diagnoses: (reviewed/updated 2/16/2019)  Patient C/Donavan Lopez 1106 Problem List:   Major depressive disorder recurrent mild severity   Continue Zoloft 50 mg po daily   Will benefit from [address] [place] [address] \" Transcranial Magnetic Stimulation \" for her depression   Need outpatient psychiatry follow up         I will continue to follow up with patient as deemed appropriate. I will continue to monitor blood levels (Depakote, Tegretol, lithium, clozapine---a drug with a narrow therapeutic index= NTI) and associated labs for drug therapy implemented that require intense monitoring for toxicity as deemed appropriate base on current medication side effects and pharmacodynamically determined drug 1/2 lives. I will continue to order blood tests/labs and diagnostic tests as deemed appropriate and review results as they become available (see orders for details and above listed lab/test results). I will order psychiatric records from previous [facility] hospitals to further elucidate the nature of patient's psychopathology and review once available. I will gather additional collateral information from friends, family and o/p treatment team to further elucidate the nature of patient's psychopathology and baselline level of psychiatric functioning.     Complete current electronic health record for patient has been reviewed today including consultant notes, ancillary staff notes, nurses and psychiatric tech notes        Signed By:   Lizett Cali MD  2/16/2019

## 2019-02-16 NOTE — ROUTINE PROCESS
Bedside shift change report given to [address] [place] [address] (oncoming nurse) by LEONARD Lopez RN (offgoing nurse). Report included the following information SBAR, Procedure Summary, Intake/Output, MAR and Recent Results.

## 2019-02-16 NOTE — PROGRESS NOTES
Post-Partum Day Number 1 Progress Note Javi Goodman Assessment: Doing well, post partum day 1, pt seems to be doing well while speaking to CNM. EPDS score of 20- pt indicated on EPDS that she has had thoughts of self harm. Pt does not have a plan nor does she verbalize these intents at this time, she states that in the last week of pregnancy she had thoughts of harm due to being in pain with pregnancy. Pt has psych history with and verbalized that she has a borderline personality disorder, has been on medication in the past. Requesting to start medication at this time, reviewed medications save with breastfeeding will place order for zoloft. Pt comfortable with taking med and following up closely in office. Psych consult to be placed to visit pt prior to discharge to evaluate and give resources for follow up psych care. Plan: 1. Continue routine postpartum and perineal care as well as maternal education. Information for the patient's :  Colby Spencer [[id number]] Vaginal, Spontaneous Patient doing well without significant complaint. Voiding without difficulty, normal lochia. Vitals: 
Visit Vitals /77 (BP 1 Location: Left arm, BP Patient Position: At rest;Supine) Pulse 87 Temp 97.7 °F (36.5 °C) Resp 16 Ht 5' 1\" (1.549 m) Wt 212 lb (96.2 kg) LMP 2018 (Exact Date) SpO2 (!) 84% Breastfeeding? Unknown BMI 40.06 kg/m² Temp (24hrs), Av.1 °F (36.7 °C), Min:97.7 °F (36.5 °C), Max:98.3 °F (36.8 °C) Exam:   Patient without distress. Abdomen soft, fundus firm, nontender Perineum with normal lochia noted. Lower extremities are negative for swelling, cords or tenderness. Labs:  
 
Lab Results Component Value Date/Time  WBC 13.6 (H) 2019 07:03 AM  
 WBC 9.9 2018 11:05 AM  
 WBC 8.1 2017 02:21 PM  
 HGB 10.2 (L) 2019 07:03 AM  
 HGB 10.7 (L) 2018 11:05 AM  
 HGB 12.9 11/30/2017 02:21 PM  
 HCT 32.8 (L) 02/14/2019 07:03 AM  
 HCT 33.0 (L) 11/21/2018 11:05 AM  
 HCT 39.2 11/30/2017 02:21 PM  
 PLATELET 536 21/81/7591 07:03 AM  
 PLATELET 461 14/67/2086 11:05 AM  
 PLATELET 277 25/37/3141 02:21 PM  
 Hgb, External 10.7 11/21/2018 Hgb, External 13.4 08/15/2018 Hct, External 33.0 11/21/2018 Hct, External 40.0 08/15/2018 Platelet cnt., External 258 11/21/2018 Platelet cnt., External 207 08/15/2018 No results found for this or any previous visit (from the past 24 hour(s)).

## 2019-02-16 NOTE — ROUTINE PROCESS
0730: Bedside shift change report given to JOSUÉ Quesada RN (oncoming nurse) by Mera Barraza RN (offgoing nurse). Report included the following information SBAR. 1230: Notified Joel Reyes CNM of pt's EPDS score of 20. Pt admits to thoughts of harming herself in the past 7 days, stating she was \"tired of being pregnant,\" but denies suicidal thoughts since delivery. EPDS resources given to pt. She will order a psych consult and begin Zoloft. 1350: Spoke with Mel Graham in psych and she will call consult into Dr. Fernando Varghese, who will come see pt today.

## 2019-02-16 NOTE — LACTATION NOTE
This note was copied from a baby's chart. Initial Lactation Consultation - Baby born vaginally this morning to a  mom at 36 6/7 weeks gestation. Mom says the baby has been latching and nursing. I did not see the baby at the breast.  
 
Mom has a history of PCOS and was taking Metformin. She noticed breast changes during her pregnancy and has seen colostrum. Feeding Plan: Mother will keep baby skin to skin as often as possible, feed on demand, respond to feeding cues, obtain latch, listen for audible swallowing, be aware of signs of oxytocin release/ cramping, thirst and sleepiness while breastfeeding. Mom will not limit the time the baby is at the breast. She will allow the baby to completely finish one breast and then offer the second breast at each feeding.

## 2019-02-17 VITALS
SYSTOLIC BLOOD PRESSURE: 128 MMHG | WEIGHT: 212 LBS | TEMPERATURE: 97.6 F | HEART RATE: 80 BPM | RESPIRATION RATE: 16 BRPM | DIASTOLIC BLOOD PRESSURE: 82 MMHG | HEIGHT: 61 IN | OXYGEN SATURATION: 84 % | BODY MASS INDEX: 40.02 KG/M2

## 2019-02-17 PROCEDURE — 74011250637 HC RX REV CODE- 250/637: Performed by: ADVANCED PRACTICE MIDWIFE

## 2019-02-17 PROCEDURE — 90707 MMR VACCINE SC: CPT | Performed by: OBSTETRICS & GYNECOLOGY

## 2019-02-17 PROCEDURE — 74011250636 HC RX REV CODE- 250/636: Performed by: OBSTETRICS & GYNECOLOGY

## 2019-02-17 RX ORDER — IBUPROFEN 800 MG/1
800 TABLET ORAL EVERY 8 HOURS
Qty: 90 TAB | Refills: 1 | Status: SHIPPED | OUTPATIENT
Start: 2019-02-17 | End: 2021-06-22 | Stop reason: ALTCHOICE

## 2019-02-17 RX ORDER — SERTRALINE HYDROCHLORIDE 50 MG/1
50 TABLET, FILM COATED ORAL DAILY
Qty: 30 TAB | Refills: 1 | Status: SHIPPED | OUTPATIENT
Start: 2019-02-17 | End: 2019-05-23 | Stop reason: SDUPTHER

## 2019-02-17 RX ADMIN — IBUPROFEN 800 MG: 400 TABLET ORAL at 13:22

## 2019-02-17 RX ADMIN — IBUPROFEN 800 MG: 400 TABLET ORAL at 04:37

## 2019-02-17 RX ADMIN — SERTRALINE 50 MG: 50 TABLET, FILM COATED ORAL at 10:11

## 2019-02-17 RX ADMIN — MEASLES, MUMPS, AND RUBELLA VIRUS VACCINE LIVE 0.5 ML: 1000; 12500; 1000 INJECTION, POWDER, LYOPHILIZED, FOR SUSPENSION SUBCUTANEOUS at 13:06

## 2019-02-17 RX ADMIN — ACETAMINOPHEN 650 MG: 325 TABLET ORAL at 10:11
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
## 2019-02-17 NOTE — PROGRESS NOTES
Post-Partum Day Number 2 Progress Note Javi Goodman Assessment: Doing well, post partum day 2 Plan: 1. Discharge home today 2. Follow up in office in 6 weeks with Caren Powers CNM , Kae Lu CNM, Talisha Branch CNM 3. Post partum activity advised, diet as tolerated 4. Discharge Medications: ibuprofen, zoloft and medications prior to admission Information for the patient's :  Colby Reichon [[id number]] Vaginal, Spontaneous Patient doing well without significant complaint. Voiding without difficulty, normal lochia. Vitals: 
Visit Vitals /69 (BP 1 Location: Left arm, BP Patient Position: At rest;Head of bed elevated (Comment degrees)) Pulse 73 Temp 98.3 °F (36.8 °C) Resp 14 Ht 5' 1\" (1.549 m) Wt 212 lb (96.2 kg) LMP 2018 (Exact Date) SpO2 (!) 84% Breastfeeding? Unknown BMI 40.06 kg/m² Temp (24hrs), Av °F (36.7 °C), Min:97.7 °F (36.5 °C), Max:98.3 °F (36.8 °C) Exam:    
    Patient without distress. Abdomen soft, fundus firm, nontender Lower extremities are negative for swelling, cords or tenderness. Labs:  
 
Lab Results Component Value Date/Time WBC 13.6 (H) 2019 07:03 AM  
 WBC 9.9 2018 11:05 AM  
 WBC 8.1 2017 02:21 PM  
 HGB 10.2 (L) 2019 07:03 AM  
 HGB 10.7 (L) 2018 11:05 AM  
 HGB 12.9 2017 02:21 PM  
 HCT 32.8 (L) 2019 07:03 AM  
 HCT 33.0 (L) 2018 11:05 AM  
 HCT 39.2 2017 02:21 PM  
 PLATELET 036  07:03 AM  
 PLATELET 545  11:05 AM  
 PLATELET 786  02:21 PM  
 Hgb, External 10.7 2018 Hgb, External 13.4 08/15/2018 Hct, External 33.0 2018 Hct, External 40.0 08/15/2018 Platelet cnt., External 258 2018 Platelet cnt., External 207 08/15/2018 No results found for this or any previous visit (from the past 24 hour(s)).

## 2019-02-17 NOTE — PROGRESS NOTES
Bedside and Verbal shift change report given to MAGEN Everett RN (oncoming nurse) by RUSS Carter RN (offgoing nurse). Report included the following information SBAR.  
 
9909: I have reviewed discharge instructions with the patient. The patient verbalized understanding.

## 2019-02-17 NOTE — DISCHARGE SUMMARY
Obstetrical Discharge Summary     Name: Lesly Calixto MRN: [id number]  SSN: [id number]    YOB: 1995  Age: 24 y.o. Sex: female      Allergies: Patient has no known allergies. Admit Date: 2019    Discharge Date: 2019     Admitting Provider: Pete Steen CNM     Attending Provider:  Guillermina Mike CNM     * Admission Diagnoses: Encounter for elective induction of labor [Z34.90]    * Discharge Diagnoses:   Information for the patient's :  Lebron Chung [[id number]]   Delivery of a 7 lb 8.5 oz (3.415 kg) female infant via Vaginal, Spontaneous on 2/15/2019 at 7:57 AM  by . Apgars were 7 and 9.        Additional Diagnoses:   Hospital Problems as of 2019 Date Reviewed: 2019          Codes Class Noted - Resolved POA    Encounter for elective induction of labor ICD-10-CM: Z34.90  ICD-9-CM: V22.1  2/15/2019 - Present Unknown             Lab Results   Component Value Date/Time    Rubella, External Non Immune 08/15/2018    GrBStrep, External Negative 2019    ABO,Rh AB Positive 08/15/2018      Immunization History   Administered Date(s) Administered    Influenza Vaccine (Quad) PF 2017, 2018    Tdap 2016, 2018       * Procedures:   * No surgery found *      [place]  Depression Scale  I have been able to laugh and see the funny side of things: Not quite so much now  I have looked forward with enjoyment to things: Definitely less than I used to  I have blamed myself unnecessarily when things went wrong: Yes, most of the time  I have been anxious or worried for no good reason: Yes, sometimes  I have felt scared or panicky for no very good reason: Yes, sometimes  Things have been getting on top of me: No, most of the time I have coped quite well  I have been so unhappy that I have had difficulty sleeping: Yes, most of the time  I have felt sad or miserable: Yes, quite often  I have been so unhappy that I have been crying: Yes, most of the time  The thought of harming myself has occurred to me: Hardly ever(pt denies since delivery)  Total Score: 20    * Discharge Condition: [facility] Course: Normal hospital course following the delivery. * Disposition: Home    Discharge Medications:   Current Discharge Medication List          * Follow-up Care/Patient Instructions:   Activity: Activity as tolerated  Diet: Regular Diet  Wound Care: None needed    Follow-up Information    None          Signed By:  Barb Luz CNM     February 17, 2019

## 2019-02-17 NOTE — ROUTINE PROCESS
Bedside shift change report given to [address] [place] [address] (oncoming nurse) by Javon Quesada RN (offgoing nurse). Report included the following information SBAR, Procedure Summary, Intake/Output, MAR and Recent Results.

## 2019-02-19 LAB
BACTERIA SPEC CULT: ABNORMAL
BACTERIA SPEC CULT: ABNORMAL
GRAM STN SPEC: ABNORMAL
SERVICE CMNT-IMP: ABNORMAL

## 2019-02-20 NOTE — PROGRESS NOTES
Pt was treated with IV ancef in hospital post delivery- I spoke to the lab with initial results while pt was in house.

## 2019-03-04 ENCOUNTER — OFFICE VISIT (OUTPATIENT)
Dept: OBGYN CLINIC | Age: 24
End: 2019-03-04

## 2019-03-04 VITALS
DIASTOLIC BLOOD PRESSURE: 70 MMHG | WEIGHT: 187 LBS | BODY MASS INDEX: 35.3 KG/M2 | HEIGHT: 61 IN | SYSTOLIC BLOOD PRESSURE: 108 MMHG

## 2019-03-04 NOTE — PROGRESS NOTES
Chief Complaint   Post-Partum Care      HPI  Isabelle Garcia is a 24 y.o. female who presents for postpartum mood check. No LMP recorded. Delivery of a 7 lb 8.5 oz (3.415 kg) female infant via Vaginal, Spontaneous on 2/15/2019. 'Collin Cannon'  Breastfeeding  EPDS 20 in hospital    Past Medical History:   Diagnosis Date    Anxiety     Borderline personality disorder ([address]     Degenerative disc disease at L5-S1 level     Depression     PCOS (polycystic ovarian syndrome)     Polycystic disease, ovaries      Past Surgical History:   Procedure Laterality Date    BREAST SURGERY PROCEDURE UNLISTED  2017    Augmentation    HX BREAST AUGMENTATION  07/2017     Social History     Occupational History    Not on file   Tobacco Use    Smoking status: Never Smoker    Smokeless tobacco: Never Used   Substance and Sexual Activity    Alcohol use: No     Alcohol/week: 0.0 oz     Comment: socially    Drug use: Yes     Types: Marijuana     Comment: couple of months ago    Sexual activity: Yes     Partners: Male     Birth control/protection: None     Family History   Problem Relation Age of Onset    Cancer Mother         skin,thyroid,ovarian   Wagner Migraines Mother     Depression Mother     Anxiety Mother     Bipolar Disorder Mother     Other Father         prediabetes    Heart Disease Father     Hypertension Father     No Known Problems Sister     Other Brother         esbergers    Diabetes Paternal Uncle        No Known Allergies  Prior to Admission medications    Medication Sig Start Date End Date Taking? Authorizing Provider   ibuprofen (MOTRIN) 800 mg tablet Take 1 Tab by mouth every eight (8) hours. 2/17/19   Eugena Bolk, CNM   sertraline (ZOLOFT) 50 mg tablet Take 1 Tab by mouth daily. 2/17/19   Eugena Bolk, CNM   melatonin 1 mg tablet Take 1 mg by mouth. Provider, Historical   calcium carbonate (TUMS) 200 mg calcium (500 mg) chew Take 1 Tab by mouth daily.     Provider, Historical ascorbate calcium (VITAMIN C PO) Take  by mouth. Provider, Historical   multivit with min-folic acid (ADULT MULTIVITAMIN GUMMIES) 200 mcg chew Take  by mouth. Provider, Historical   venlafaxine-SR (EFFEXOR-XR) 37.5 mg capsule Take 1 Cap by mouth daily. 4/24/18   Leatha Ramos MD        Review of Systems: History obtained from the patient  Constitutional: negative for weight loss, fever, night sweats  Breast: negative for breast lumps, nipple discharge, galactorrhea  GI: negative for change in bowel habits, abdominal pain, black or bloody stools  : negative for frequency, dysuria, hematuria, vaginal discharge  MSK: negative for back pain, joint pain, muscle pain  Skin: negative for itching, rash, hives  Psych: negative for anxiety, depression, change in mood      Objective:  Visit Vitals  Ht 5' 1\" (1.549 m)   Wt 187 lb (84.8 kg)   Breastfeeding? Yes   BMI 35.33 kg/m²       Physical Exam:   PHYSICAL EXAMINATION    Constitutional  · Appearance: well-nourished, well developed, alert, in no acute distress    Gastrointestinal  · declines    Genitourinary  · declines    Skin  · General Inspection: no rash, no lesions identified    Neurologic/Psychiatric  · Mental Status:  · Orientation: grossly oriented to person, place and time  · Mood and Affect: mood normal, affect appropriate  · EPDS- 5     Assessment:   Doing well - states she had baby blues but now feels much better - EPDS much improved. Pt to continue taking zoloft daily -     Plan:   Follow up for 6 week visit    Brooklyn Sanchez CNM        RTO prn if symptoms persist or worsen. Instructions given to pt. Handouts given to pt.

## 2019-03-04 NOTE — PATIENT INSTRUCTIONS
Postpartum: Care Instructions  Your Care Instructions  After childbirth (postpartum period), your body goes through many changes. Some of these changes happen over several weeks. In the hours after delivery, your body will begin to recover from childbirth while it prepares to breastfeed your . You may feel emotional during this time. Your hormones can shift your mood without warning for no clear reason. In the first couple of weeks after childbirth, many women have emotions that change from happy to sad. You may find it hard to sleep. You may cry a lot. This is called the \"baby blues. \" These overwhelming emotions often go away within a couple of days or weeks. But it's important to discuss your feelings with your doctor. It is easy to get too tired and overwhelmed during the first weeks after childbirth. Don't try to do too much. Get rest whenever you can, accept help from others, and eat well and drink plenty of fluids. About 4 to 6 weeks after your baby's birth, you will have a follow-up visit with your doctor. This visit is your time to talk to your doctor about anything you are concerned or curious about. Follow-up care is a key part of your treatment and safety. Be sure to make and go to all appointments, and call your doctor if you are having problems. It's also a good idea to know your test results and keep a list of the medicines you take. How can you care for yourself at home? · Sleep or rest when your baby sleeps. · Get help with household chores from family or friends, if you can. Do not try to do it all yourself. · If you have hemorrhoids or swelling or pain around the opening of your vagina, try using cold and heat. You can put ice or a cold pack on the area for 10 to 20 minutes at a time. Put a thin cloth between the ice and your skin. Also try sitting in a few inches of warm water (sitz bath) 3 times a day and after bowel movements. · Take pain medicines exactly as directed. ?  If the doctor gave you a prescription medicine for pain, take it as prescribed. ? If you are not taking a prescription pain medicine, ask your doctor if you can take an over-the-counter medicine. · Eat more fiber to avoid constipation. Include foods such as whole-grain breads and cereals, raw vegetables, raw and dried fruits, and beans. · Drink plenty of fluids, enough so that your urine is light yellow or clear like water. If you have kidney, heart, or liver disease and have to limit fluids, talk with your doctor before you increase the amount of fluids you drink. · Do not rinse inside your vagina with fluids (douche). · If you have stitches, keep the area clean by pouring or spraying warm water over the area outside your vagina and anus after you use the toilet. · Keep a list of questions to bring to your postpartum visit. Your questions might be about:  ? Changes in your breasts, such as lumps or soreness. ? When to expect your menstrual period to start again. ? What form of birth control is best for you. ? Weight you have put on during the pregnancy. ? Exercise options. ? What foods and drinks are best for you, especially if you are breastfeeding. ? Problems you might be having with breastfeeding. ? When you can have sex. Some women may want to talk about lubricants for the vagina. ? Any feelings of sadness or restlessness that you are having. When should you call for help? Call 911 anytime you think you may need emergency care.  For example, call if:    · You have thoughts of harming yourself, your baby, or another person.     · You passed out (lost consciousness).    Call your doctor now or seek immediate medical care if:    · Your vaginal bleeding seems to be getting heavier.     · You are dizzy or lightheaded, or you feel like you may faint.     · You have a fever.    Watch closely for changes in your health, and be sure to contact your doctor if:    · You have new or worse vaginal discharge.     · You feel sad or depressed.     · You are having problems with your breasts or breastfeeding. Where can you learn more? Go to http://maureen-diana.info/. Enter P482 in the search box to learn more about \"Postpartum: Care Instructions. \"  Current as of: September 5, 2018  Content Version: 11.9  © 1293-8361 [employer]. Care instructions adapted under license by [employer] (which disclaims liability or warranty for this information). If you have questions about a medical condition or this instruction, always ask your healthcare professional. [address] any warranty or liability for your use of this information.

## 2019-03-26 ENCOUNTER — OFFICE VISIT (OUTPATIENT)
Dept: OBGYN CLINIC | Age: 24
End: 2019-03-26

## 2019-03-26 VITALS
HEIGHT: 61 IN | DIASTOLIC BLOOD PRESSURE: 78 MMHG | WEIGHT: 197 LBS | BODY MASS INDEX: 37.19 KG/M2 | SYSTOLIC BLOOD PRESSURE: 120 MMHG

## 2019-03-26 DIAGNOSIS — Z30.430 ENCOUNTER FOR IUD INSERTION: ICD-10-CM

## 2019-03-26 RX ORDER — SERTRALINE HYDROCHLORIDE 50 MG/1
75 TABLET, FILM COATED ORAL DAILY
Qty: 31 TAB | Refills: 1 | Status: SHIPPED | OUTPATIENT
Start: 2019-03-26 | End: 2019-04-26

## 2019-03-26 NOTE — PROGRESS NOTES
-----------------------------------IUD INSERTION----------------------------------------- Indications:  Edgardo Wilson is a ,  24 y.o. female [facility] whose No LMP recorded. was on  and was normal in duration and amount of flow. who presents for insertion of an IUD, requestimg at her PP visit. The risks, benefits and alternatives of IUD insertion were discussed in detail at last visit, to include potential for spotting, increase in depressive symptoms as pt has a mental health hx and currently taking zoloft, decrease in milk supply, and uterine perforation. She also has reviewed [employer] information. She has elected to proceed with the insertion today and she states she has no further questions. No components found for: SPEP, UPEP    Procedure: The pelvic exam revealed normal external genitalia. On bimanual exam the uterus was retroverted and normal in size with no tenderness present. A speculum was inserted into the vagina and the cervix was visualized. The cervix was prepped with betadine solution. The anterior lip of the cervix was grasped with an leo clamp. The uterus was sounded with a Whipple sound to 7 centimeters. A kyleena was then inserted without difficulty. The string was cut to 3 centimeters. She experienced a mild  amount of cramping. Post Procedure Status: She tolerated the procedure with mild discomfort. The patient was observed for 5 minutes after the insertion. There were no complications. Patient was discharged in stable condition. The patient received [employer] lot number [id number].     [facility] [address] [place] [address]  OFFICE PROCEDURE PROGRESS NOTE        Chart reviewed for the following:   Mera MANJARREZ LPN, have reviewed the History, Physical and updated the Allergic reactions for [address] performed immediately prior to start of procedure:   Stalin Palmer LPN, have performed the following reviews on Edgardo Wilson prior to the start of the procedure:            * Patient was identified by name and date of birth   * Agreement on procedure being performed was verified  * Risks and Benefits explained to the patient  * Procedure site verified and marked as necessary  * Patient was positioned for comfort  * Consent was signed and verified     Time: 1140      Date of procedure: 3/26/2019    Procedure performed by:  Arian Serrano CNM    How tolerated by patient: tolerated the procedure well with no complications    Post Procedural Pain Scale: 2 - Hurts Little Bit    Comments: none

## 2019-03-26 NOTE — PATIENT INSTRUCTIONS
Postpartum: Care Instructions  Your Care Instructions  After childbirth (postpartum period), your body goes through many changes. Some of these changes happen over several weeks. In the hours after delivery, your body will begin to recover from childbirth while it prepares to breastfeed your . You may feel emotional during this time. Your hormones can shift your mood without warning for no clear reason. In the first couple of weeks after childbirth, many women have emotions that change from happy to sad. You may find it hard to sleep. You may cry a lot. This is called the \"baby blues. \" These overwhelming emotions often go away within a couple of days or weeks. But it's important to discuss your feelings with your doctor. It is easy to get too tired and overwhelmed during the first weeks after childbirth. Don't try to do too much. Get rest whenever you can, accept help from others, and eat well and drink plenty of fluids. About 4 to 6 weeks after your baby's birth, you will have a follow-up visit with your doctor. This visit is your time to talk to your doctor about anything you are concerned or curious about. Follow-up care is a key part of your treatment and safety. Be sure to make and go to all appointments, and call your doctor if you are having problems. It's also a good idea to know your test results and keep a list of the medicines you take. How can you care for yourself at home? · Sleep or rest when your baby sleeps. · Get help with household chores from family or friends, if you can. Do not try to do it all yourself. · If you have hemorrhoids or swelling or pain around the opening of your vagina, try using cold and heat. You can put ice or a cold pack on the area for 10 to 20 minutes at a time. Put a thin cloth between the ice and your skin. Also try sitting in a few inches of warm water (sitz bath) 3 times a day and after bowel movements. · Take pain medicines exactly as directed. ?  If the doctor gave you a prescription medicine for pain, take it as prescribed. ? If you are not taking a prescription pain medicine, ask your doctor if you can take an over-the-counter medicine. · Eat more fiber to avoid constipation. Include foods such as whole-grain breads and cereals, raw vegetables, raw and dried fruits, and beans. · Drink plenty of fluids, enough so that your urine is light yellow or clear like water. If you have kidney, heart, or liver disease and have to limit fluids, talk with your doctor before you increase the amount of fluids you drink. · Do not rinse inside your vagina with fluids (douche). · If you have stitches, keep the area clean by pouring or spraying warm water over the area outside your vagina and anus after you use the toilet. · Keep a list of questions to bring to your postpartum visit. Your questions might be about:  ? Changes in your breasts, such as lumps or soreness. ? When to expect your menstrual period to start again. ? What form of birth control is best for you. ? Weight you have put on during the pregnancy. ? Exercise options. ? What foods and drinks are best for you, especially if you are breastfeeding. ? Problems you might be having with breastfeeding. ? When you can have sex. Some women may want to talk about lubricants for the vagina. ? Any feelings of sadness or restlessness that you are having. When should you call for help? Call 911 anytime you think you may need emergency care.  For example, call if:    · You have thoughts of harming yourself, your baby, or another person.     · You passed out (lost consciousness).    Call your doctor now or seek immediate medical care if:    · Your vaginal bleeding seems to be getting heavier.     · You are dizzy or lightheaded, or you feel like you may faint.     · You have a fever.    Watch closely for changes in your health, and be sure to contact your doctor if:    · You have new or worse vaginal discharge.     · You feel sad or depressed.     · You are having problems with your breasts or breastfeeding. Where can you learn more? Go to http://maureen-diana.info/. Enter H818 in the search box to learn more about \"Postpartum: Care Instructions. \"  Current as of: September 5, 2018  Content Version: 11.9  © 9104-6777 [employer]. Care instructions adapted under license by [employer] (which disclaims liability or warranty for this information). If you have questions about a medical condition or this instruction, always ask your healthcare professional. [address] any warranty or liability for your use of this information.

## 2019-03-26 NOTE — PROGRESS NOTES
Postpartum evaluation    Wilman Duran is a 24 y.o. female who presents for a postpartum exam.     Delivery of a 7 lb 8.5 oz (3.415 kg) female infant via Vaginal, Spontaneous on 2/15/2019. 'Rosa Ram'    Her baby is doing well. She has had no menses since delivery. She has had the following significant problems since her delivery: none    The patient is breast feeding without difficulty. The patient would like to use IUD for birth control- requesting placement today. She is currently taking: pnv and zoloft 50 mg - feeling better however still has \"tough days\" would like to increase slightly- has history of taking 100 mg in the past, pt would like to try 75mg if possible. She is due for her next AE in 3-6 months. Visit Vitals  /78   Ht 5' 1\" (1.549 m)   Wt 197 lb (89.4 kg)   Breastfeeding?  Yes   BMI 37.22 kg/m²       PHYSICAL EXAMINATION    Constitutional  · Appearance: well-nourished, well developed, alert, in no acute distress    HENT  · Head and Face: appears normal    Neck  · declines    Breasts  · Declines    Gastrointestinal  · Abdominal Examination: abdomen non-tender to palpation, no masses present  · Liver and spleen: no hepatomegaly present, spleen not palpable  · Hernias: no hernias identified    Genitourinary  · External Genitalia: normal appearance for age, no discharge present, no tenderness present, no inflammatory lesions present, no masses present, no atrophy present  · Vagina: normal vaginal vault without central or paravaginal defects, no discharge present, no inflammatory lesions present, no masses present  · Bladder: non-tender to palpation  · Urethra: appears normal  · Cervix: normal   · Uterus: normal size, shape and consistency  · Adnexa: no adnexal tenderness present, no adnexal masses present  · Perineum: perineum within normal limits, no evidence of trauma, no rashes or skin lesions present  · Anus: anus within normal limits, no hemorrhoids present  · Inguinal Lymph Nodes: no lymphadenopathy present    Skin  · General Inspection: no rash, no lesions identified    Neurologic/Psychiatric  · Mental Status:  · Orientation: grossly oriented to person, place and time  · Mood and Affect: mood normal, affect appropriate    Assessment:  Normal postpartum check- may resume normal pre pregnant activities  IUD placement after lengthy discussion on R/B/A- see placement note  Post partum depression- EPDS 7 - pt is much improved from previous EPDS scores- per pt request increased zoloft to 75 mg-     Plan:  Follow up in 1 month for IUD check and medication evaluation  Safety contract with pt- verbal- to call or go to ER if feelings of self harm or harming baby, pt has good home support and SO states she is doing much better.     Corrie Sanchez CNM

## 2019-04-29 ENCOUNTER — OFFICE VISIT (OUTPATIENT)
Dept: OBGYN CLINIC | Age: 24
End: 2019-04-29

## 2019-04-29 VITALS — BODY MASS INDEX: 37.22 KG/M2 | HEIGHT: 61 IN

## 2019-04-29 DIAGNOSIS — Z30.431 IUD CHECK UP: Primary | ICD-10-CM

## 2019-04-29 DIAGNOSIS — N94.10 DYSPAREUNIA, FEMALE: ICD-10-CM

## 2019-04-29 NOTE — PROGRESS NOTES
Chief Complaint   Checkup IUD      HPI  Román German is a 24 y.o. female who presents for IUD check up. No LMP recorded. Kyleena inserted 3/26  Breastfeeding  Painful intercourse    Past Medical History:   Diagnosis Date    Anxiety     Borderline personality disorder ([address]     Degenerative disc disease at L5-S1 level     Depression     PCOS (polycystic ovarian syndrome)     Polycystic disease, ovaries      Past Surgical History:   Procedure Laterality Date    BREAST SURGERY PROCEDURE UNLISTED  2017    Augmentation    HX BREAST AUGMENTATION  07/2017     Social History     Occupational History    Not on file   Tobacco Use    Smoking status: Never Smoker    Smokeless tobacco: Never Used   Substance and Sexual Activity    Alcohol use: No     Alcohol/week: 0.0 oz     Comment: socially    Drug use: Yes     Types: Marijuana     Comment: couple of months ago    Sexual activity: Yes     Partners: Male     Birth control/protection: None     Family History   Problem Relation Age of Onset    Cancer Mother         skin,thyroid,ovarian   Arian Shield Migraines Mother     Depression Mother     Anxiety Mother     Bipolar Disorder Mother     Other Father         prediabetes    Heart Disease Father     Hypertension Father     No Known Problems Sister     Other Brother         esbergers    Diabetes Paternal Uncle        No Known Allergies  Prior to Admission medications    Medication Sig Start Date End Date Taking? Authorizing Provider   levonorgestrel (KYLEENA) 17.5 mcg/24 hrs (5 yrs) 19.5 mg IUD IUD 1 Each by IntraUTERine route once. Yes Provider, Historical   sertraline (ZOLOFT) 50 mg tablet Take 1 Tab by mouth daily. 2/17/19  Yes Mag Riggins CNM   multivit with min-folic acid (ADULT MULTIVITAMIN GUMMIES) 200 mcg chew Take  by mouth. Yes Provider, Historical   ibuprofen (MOTRIN) 800 mg tablet Take 1 Tab by mouth every eight (8) hours.  2/17/19   Mag Riggins CNM   melatonin 1 mg tablet Take 1 mg by mouth. Provider, Historical   calcium carbonate (TUMS) 200 mg calcium (500 mg) chew Take 1 Tab by mouth daily. Provider, Historical   ascorbate calcium (VITAMIN C PO) Take  by mouth. Provider, Historical        Review of Systems: History obtained from the patient  Constitutional: negative for weight loss, fever, night sweats  Breast: negative for breast lumps, nipple discharge, galactorrhea  GI: negative for change in bowel habits, abdominal pain, black or bloody stools  : negative for frequency, dysuria, hematuria, vaginal discharge  MSK: negative for back pain, joint pain, muscle pain  Skin: negative for itching, rash, hives  Psych: negative for anxiety, depression, change in mood      Objective:  Visit Vitals   5' 1\" (1.549 m)   Breastfeeding?  Yes   BMI 37.22 kg/m²       Physical Exam:   PHYSICAL EXAMINATION    Constitutional  · Appearance: well-nourished, well developed, alert, in no acute distress    Gastrointestinal  · Not indicated    Genitourinary  · External Genitalia: normal appearance for age, no discharge present, no tenderness present, no inflammatory lesions present, no masses present, no atrophy present  · Vagina: normal vaginal vault without central or paravaginal defects, no discharge present, no inflammatory lesions present, no masses present  · Bladder: non-tender to palpation  · Urethra: appears normal  · Cervix: normal - IUD string center or cervical os   · Uterus: normal size, shape and consistency  · Adnexa: no adnexal tenderness present, no adnexal masses present  · Perineum: perineum within normal limits, no evidence of trauma, no rashes or skin lesions present  · Anus: anus within normal limits, no hemorrhoids present  · Inguinal Lymph Nodes: no lymphadenopathy present    Skin  · General Inspection: no rash, no lesions identified    Neurologic/Psychiatric  · Mental Status:  · Orientation: grossly oriented to person, place and time  · Mood and Affect: mood normal, affect appropriate    Assessment/Plan:  IUD check  Dyspareunia- breast feeding, reviewed normal versus abnormal, reviewed lubricants, reviewed options of PFPT and small amount estrogen based cream     Pt has tried lubricants however has been unable to engage in intercourse for and length of time without severe pain. Small amount Premarin cream to introitus and  Every other day x 2 weeks then x2 weekly and PFPT consult sent for eval- pt to follow up in 2 months for AE and revaluation    RTO prn if symptoms persist or worsen. Instructions given to pt.     Angela Herbert CNM

## 2019-04-29 NOTE — PATIENT INSTRUCTIONS
Levonorgestrel (Into the uterus)   Levonorgestrel ([id number]-nie-EMANI-trel)  Prevents pregnancy and treats heavy menstrual bleeding. This is an intrauterine device (IUD), which is a reversible form of birth control. This IUD slowly releases levonorgestrel, a hormone. Brand Name(s): Sudeep Keely, Mirena, [place]   There may be other brand names for this medicine. When This Medicine Should Not Be Used: This device is not right for everyone. Do not use it if you had an allergic reaction to levonorgestrel, or if you are pregnant. How to Use This Medicine:   Device  · A nurse or other trained health professional will give you this medicine. · The IUD is usually inserted by your doctor during your monthly period. You will need to see your doctor 4 to 6 weeks after the IUD is placed and then once a year. · Your IUD has a string or \"tail\" that is made of plastic thread. About one or two inches of this string hangs into your vagina. You cannot see this string, and it will not cause problems when you have sex. Check your IUD after each monthly period. You may not be protected against pregnancy if you cannot feel the string or if you feel plastic. Do the following to check the placement of your IUD:  [facility] [employer] your hands with soap and warm water. Dry them with a clean towel. ¨ Bend your knees and squat low to the ground. ¨ Gently put your index finger high inside your vagina. The cervix is at the top of the vagina. Find the IUD string coming from your cervix. Never pull on the string. You should not be able to feel the plastic of the IUD itself. Wash your hands after you are done checking your IUD string. · Your doctor will need to remove your IUD after 3 years for [place], after 4 years for [place], or after 5 years for [facility] or [facility]. You will also need to have it replaced if it comes out of your uterus. If you are using Mirena® or Liletta® and want to stop, your doctor can remove it at any time.  However, you may become pregnant as soon as Mirena® is removed or if you have intercourse the week before Rickey Urdu is removed. Use another form of birth control or have a new IUD inserted to keep from getting pregnant. Drugs and Foods to Avoid:   Ask your doctor or pharmacist before using any other medicine, including over-the-counter medicines, vitamins, and herbal products. · Some medicines can affect how this device works. Tell your doctor if you are using a blood thinner (including warfarin). Warnings While Using This Medicine:   · Tell your doctor if you are breastfeeding, or if you had a baby, miscarriage, or  in the past 3 months. Tell your doctor if you have liver disease (including tumor or cancer), heart disease, breast cancer, heart or blood circulation problems, migraine, high blood pressure, or a history of heart valve problems, blood clotting problems, stroke, or heart attack. Tell your doctor if you have problems with your immune system or have had surgery on your female organs (especially fallopian tubes). · Tell your doctor if you have had any problems, infections, or other conditions that affected your reproductive system. There are many problems that could make an IUD a bad choice for you, including if you have fibroids, unexplained bleeding, a uterus that has an unusual shape, a recent infection, a history of pelvic inflammatory disease, an abnormal Pap test, ectopic pregnancy, cancer or suspected cancer, or an existing IUD. · There is a small chance that you could get pregnant when using an IUD, just as there is with any birth control. If you get pregnant, your doctor may remove your IUD to lower the risk of miscarriage or other problems.   · This medicine may cause the following problems:  ¨ Increased risk of ectopic pregnancy (pregnancy outside the uterus)  ¨ Increased risk of serious infections, including sepsis  ¨ Increased risk of pelvic inflammatory disease (PID) or endometritis  ¨ Perforation (hole in the wall of your uterus), which can damage other organs  ¨ Increased risk for ovarian cysts  ¨ Increased risk of breast cancer  ¨ Increased risk of high blood pressure, stroke, heart attack, or clotting problems  · You might have some spotting and cramping during the first weeks after the IUD has been inserted. These symptoms should decrease or go away within a few weeks up to 6 months. · You could have less bleeding or even stop having periods by the end of the first year. Call your doctor if you have a change from your regular bleeding pattern after you have had your IUD for awhile, such as more bleeding or if you miss a period (and you were having periods even with your IUD). · An IUD can slip partly or all the way out of your uterus. If this happens, use condoms or another form of birth control, and call your doctor right away. · This IUD will not protect you from HIV/AIDS or other sexually transmitted diseases. · If you have the [address] or Janith Koyanagi, tell your healthcare provider before you have an MRI test.  · Your doctor will check your progress and the effects of this medicine at regular visits. Keep all appointments.   Possible Side Effects While Using This Medicine:   Call your doctor right away if you notice any of these side effects:  · Allergic reaction: Itching or hives, swelling in your face or hands, swelling or tingling in your mouth or throat, chest tightness, trouble breathing  · Chest pain, problems with speech or walking, numbness or weakness in your arm or leg or on one side of your body  · Heavy bleeding from your vagina  · Pain during sex, or if your partner feels the hard plastic of the IUD during sex  · Severe headache, vision changes  · Stomach or pelvic pain, tenderness, or cramping that is sudden or severe  · Unusual bleeding, bruising, or weakness  · Vaginal discharge that has a bad smell, fever, chills, sores on your genitals  · Yellow skin or eyes  If you notice these less serious side effects, talk with your doctor:   · Acne or other skin changes  · Breast pain  · Change in bleeding pattern after the first few months  · Dizziness or lightheadedness after IUD is placed  · Mild itching around your vagina and genitals  If you notice other side effects that you think are caused by this medicine, tell your doctor. Call your doctor for medical advice about side effects. You may report side effects to FDA at 1-449-FYJ-1356  © 2017 [facility] Information is for End User's use only and may not be sold, redistributed or otherwise used for commercial purposes. The above information is an  only. It is not intended as medical advice for individual conditions or treatments. Talk to your doctor, nurse or pharmacist before following any medical regimen to see if it is safe and effective for you.

## 2019-05-29 ENCOUNTER — TELEPHONE (OUTPATIENT)
Dept: OBGYN CLINIC | Age: 24
End: 2019-05-29

## 2019-05-29 NOTE — TELEPHONE ENCOUNTER
Scheduled with next available on 6/6/19 with JULITO as patient wanted this. She does not know any plans of replacing with another IUD.

## 2019-05-29 NOTE — TELEPHONE ENCOUNTER
Tylor Alston CNM  [place] [facility], LPN   Caller: Unspecified (Today, 10:07 AM)             I would prefer next week as I am out of the office until then. My schedule today is full and I am currently at the hospital with a labor patient. If she is good with seeing another provider that can get her in faster please schedule her as such.      Thank you    Previous Messages

## 2019-05-29 NOTE — TELEPHONE ENCOUNTER
Patient is wanting to be worked in this week for the removal of her IUD. She just wants it out as she is battling depression. She asked if you could work her in, if not we can do next week.

## 2019-06-06 ENCOUNTER — OFFICE VISIT (OUTPATIENT)
Dept: OBGYN CLINIC | Age: 24
End: 2019-06-06

## 2019-06-06 VITALS
DIASTOLIC BLOOD PRESSURE: 80 MMHG | WEIGHT: 197.2 LBS | BODY MASS INDEX: 38.72 KG/M2 | HEIGHT: 60 IN | SYSTOLIC BLOOD PRESSURE: 120 MMHG

## 2019-06-06 DIAGNOSIS — Z30.432 ENCOUNTER FOR IUD REMOVAL: Primary | ICD-10-CM

## 2019-06-06 NOTE — PROGRESS NOTES
IUD REMOVAL  Indications for Removal:  Gildardo Guillory is a ,  24 y.o. female [facility] whose No LMP recorded. was on . who presents today for IUD removal. Her current IUD was placedfour months. She has had an increase in depressive symptoms since placement of IUD. She requests removal of the IUD due to these symptoms. The IUD removal procedure was discussed with the patient and she had no further questions. Procedure: The patient was placed in a dorsal lithotomy position and appropriately draped. On bimanual exam the uterus was anterior and normal in size with no tenderness present. A speculum exam was performed and the cervix was visualized. The IUD string was visualized. Using ring forceps , the string was grasped and the IUD removed intact. The IUD was shown to the patient. Patient tolerated removal with no significant discomfort. Brief Birthcontrol discussion options reviewed - pt will use condoms at this time. No need for full discussion at this time as pt  Comfortable with condoms for now.     Will call for additional apt if desires alternative form     Jenifer Hopper CNM

## 2019-09-08 ENCOUNTER — HOSPITAL ENCOUNTER (EMERGENCY)
Age: 24
Discharge: HOME | End: 2019-09-08
Attending: EMERGENCY MEDICINE
Payer: SELF-PAY

## 2019-09-08 VITALS
SYSTOLIC BLOOD PRESSURE: 124 MMHG | DIASTOLIC BLOOD PRESSURE: 86 MMHG | HEIGHT: 62 IN | HEART RATE: 77 BPM | TEMPERATURE: 98.1 F | WEIGHT: 188 LBS | BODY MASS INDEX: 34.6 KG/M2 | OXYGEN SATURATION: 98 % | RESPIRATION RATE: 18 BRPM

## 2019-09-08 DIAGNOSIS — N30.01 ACUTE CYSTITIS WITH HEMATURIA: Primary | ICD-10-CM

## 2019-09-08 LAB
APPEARANCE UR: ABNORMAL
BACTERIA URNS QL MICRO: NEGATIVE /HPF
BILIRUB UR QL: NEGATIVE
CLUE CELLS VAG QL WET PREP: NORMAL
COLOR UR: ABNORMAL
EPITH CASTS URNS QL MICRO: ABNORMAL /LPF
GLUCOSE UR STRIP.AUTO-MCNC: NEGATIVE MG/DL
HCG UR QL: NEGATIVE
HGB UR QL STRIP: ABNORMAL
KETONES UR QL STRIP.AUTO: NEGATIVE MG/DL
KOH PREP SPEC: NORMAL
LEUKOCYTE ESTERASE UR QL STRIP.AUTO: ABNORMAL
NITRITE UR QL STRIP.AUTO: NEGATIVE
PH UR STRIP: 6 [PH] (ref 5–8)
PROT UR STRIP-MCNC: NEGATIVE MG/DL
RBC #/AREA URNS HPF: ABNORMAL /HPF (ref 0–5)
SERVICE CMNT-IMP: NORMAL
SP GR UR REFRACTOMETRY: 1.01 (ref 1–1.03)
T VAGINALIS VAG QL WET PREP: NORMAL
UA: UC IF INDICATED,UAUC: ABNORMAL
UROBILINOGEN UR QL STRIP.AUTO: 0.2 EU/DL (ref 0.2–1)
WBC URNS QL MICRO: ABNORMAL /HPF (ref 0–4)

## 2019-09-08 PROCEDURE — 87077 CULTURE AEROBIC IDENTIFY: CPT

## 2019-09-08 PROCEDURE — 87491 CHLMYD TRACH DNA AMP PROBE: CPT

## 2019-09-08 PROCEDURE — 99283 EMERGENCY DEPT VISIT LOW MDM: CPT

## 2019-09-08 PROCEDURE — 87186 SC STD MICRODIL/AGAR DIL: CPT

## 2019-09-08 PROCEDURE — 81001 URINALYSIS AUTO W/SCOPE: CPT

## 2019-09-08 PROCEDURE — 87210 SMEAR WET MOUNT SALINE/INK: CPT

## 2019-09-08 PROCEDURE — 87086 URINE CULTURE/COLONY COUNT: CPT

## 2019-09-08 PROCEDURE — 81025 URINE PREGNANCY TEST: CPT

## 2019-09-08 RX ORDER — CEPHALEXIN 500 MG/1
500 CAPSULE ORAL 2 TIMES DAILY
Qty: 14 CAP | Refills: 0 | Status: SHIPPED | OUTPATIENT
Start: 2019-09-08 | End: 2019-09-12

## 2019-09-08 NOTE — ED PROVIDER NOTES
EMERGENCY DEPARTMENT HISTORY AND PHYSICAL EXAM      Date: 9/8/2019  Patient Name: Guerita Cuenca    History of Presenting Illness     Chief Complaint   Patient presents with    Fever     reports temp of 101 this am    Abdominal Pain     times five days     History Provided By: Patient    HPI: Guerita Cuenca, 24 y.o. female with depression, anxiety, Borderline personality disorder, PCOS who presents ambulatory to the ED with cc of acute moderate dysuria, vaginal irritation/discharge and abd cramping x 1 week. NO meds or modifying factors. Endorses fever this AM. Denies n/v, hematuria, cp, sob, headache. Denies concern for STI. PCP: None    There are no other complaints, changes, or physical findings at this time. No current facility-administered medications on file prior to encounter. Current Outpatient Medications on File Prior to Encounter   Medication Sig Dispense Refill    multivit with min-folic acid (ADULT MULTIVITAMIN GUMMIES) 200 mcg chew Take  by mouth.  sertraline (ZOLOFT) 50 mg tablet Take 1.5 tablets po daily 45 Tab 3    ibuprofen (MOTRIN) 800 mg tablet Take 1 Tab by mouth every eight (8) hours. 90 Tab 1    melatonin 1 mg tablet Take 1 mg by mouth.  calcium carbonate (TUMS) 200 mg calcium (500 mg) chew Take 1 Tab by mouth daily.  ascorbate calcium (VITAMIN C PO) Take  by mouth.        Past History     Past Medical History:  Past Medical History:   Diagnosis Date    Anxiety     Borderline personality disorder ([address]     Degenerative disc disease at L5-S1 level     Depression     PCOS (polycystic ovarian syndrome)     Polycystic disease, ovaries      Past Surgical History:  Past Surgical History:   Procedure Laterality Date    BREAST SURGERY PROCEDURE UNLISTED  2017    Augmentation    HX BREAST AUGMENTATION  07/2017     Family History:  Family History   Problem Relation Age of Onset    Cancer Mother         skin,thyroid,ovarian    Migraines Mother     Depression Mother     Anxiety Mother     Bipolar Disorder Mother     Other Father         prediabetes    Heart Disease Father     Hypertension Father     No Known Problems Sister     Other Brother         esbergers    Diabetes Paternal Uncle      Social History:  Social History     Tobacco Use    Smoking status: Never Smoker    Smokeless tobacco: Never Used   Substance Use Topics    Alcohol use: No     Alcohol/week: 0.0 standard drinks     Comment: socially    Drug use: Yes     Types: Marijuana     Comment: couple of months ago     Allergies:  No Known Allergies  Review of Systems   Review of Systems   Constitutional: Negative for activity change, chills, fatigue and fever. HENT: Negative. Respiratory: Negative. Negative for cough and shortness of breath. Cardiovascular: Negative. Negative for chest pain. Gastrointestinal: Negative for abdominal pain, constipation, diarrhea, nausea and vomiting. Genitourinary: Positive for dysuria, frequency, pelvic pain and vaginal discharge. Negative for difficulty urinating, flank pain, genital sores, hematuria, menstrual problem, urgency, vaginal bleeding and vaginal pain. Musculoskeletal: Negative. Negative for back pain. Skin: Negative. Negative for rash. Neurological: Negative. Negative for headaches. Psychiatric/Behavioral: Negative. Physical Exam   Physical Exam   Constitutional: She is oriented to person, place, and time. She appears well-developed and well-nourished. No distress. HENT:   Head: Normocephalic and atraumatic. Right Ear: Hearing and external ear normal.   Left Ear: Hearing and external ear normal.   Nose: Nose normal.   Eyes: Pupils are equal, round, and reactive to light. Conjunctivae and EOM are normal.   Neck: Normal range of motion. Cardiovascular: Normal rate, regular rhythm, normal heart sounds and intact distal pulses. Pulmonary/Chest: Effort normal and breath sounds normal. No respiratory distress.  She has no wheezes. She has no rales. Abdominal: Soft. Bowel sounds are normal. She exhibits no distension and no mass. There is no tenderness. There is no rigidity, no rebound, no guarding, no CVA tenderness, no tenderness at McBurney's point and negative Nunez's sign. Musculoskeletal: Normal range of motion. Neurological: She is alert and oriented to person, place, and time. Skin: Skin is warm and dry. She is not diaphoretic. Psychiatric: She has a normal mood and affect. Her behavior is normal. Judgment and thought content normal.   Nursing note and vitals reviewed. Diagnostic Study Results   Labs -     Recent Results (from the past 12 hour(s))   URINALYSIS W/ REFLEX CULTURE    Collection Time: 09/08/19  9:10 AM   Result Value Ref Range    Color YELLOW/STRAW      Appearance CLOUDY (A) CLEAR      Specific gravity 1.015 1.003 - 1.030      pH (UA) 6.0 5.0 - 8.0      Protein NEGATIVE  NEG mg/dL    Glucose NEGATIVE  NEG mg/dL    Ketone NEGATIVE  NEG mg/dL    Bilirubin NEGATIVE  NEG      Blood LARGE (A) NEG      Urobilinogen 0.2 0.2 - 1.0 EU/dL    Nitrites NEGATIVE  NEG      Leukocyte Esterase MODERATE (A) NEG      WBC 5-10 0 - 4 /hpf    RBC 5-10 0 - 5 /hpf    Epithelial cells FEW FEW /lpf    Bacteria NEGATIVE  NEG /hpf    UA:UC IF INDICATED URINE CULTURE ORDERED (A) CNI     ARTI, OTHER SOURCES    Collection Time: 09/08/19  9:10 AM   Result Value Ref Range    Special Requests: NO SPECIAL REQUESTS      KOH NO YEAST SEEN     WET PREP    Collection Time: 09/08/19  9:10 AM   Result Value Ref Range    Clue cells CLUE CELLS ABSENT      Wet prep NO TRICHOMONAS SEEN     HCG URINE, QL. - POC    Collection Time: 09/08/19  9:25 AM   Result Value Ref Range    Pregnancy test,urine (POC) NEGATIVE  NEG         Radiologic Studies -   No orders to display     No results found. Medical Decision Making   I am the first provider for this patient.     I reviewed the vital signs, available nursing notes, past medical history, past surgical history, family history and social history. Vital Signs-Reviewed the patient's vital signs. Patient Vitals for the past 12 hrs:   Temp Pulse Resp BP SpO2   09/08/19 0836 98.1 °F (36.7 °C) 77 18 124/86 98 %     Pulse Oximetry Analysis - 98% on RA    Records Reviewed: Nursing Notes, Old Medical Records, Previous Radiology Studies and Previous Laboratory Studies    Provider Notes (Medical Decision Making):   Patient was presents with dysuria and abd crmaping. DDx: Acute cystitis, pyleonephritis, ureteral stone, STI. Will obtain UA. Discussed with the patient diagnosis and test results and all questioned fully answered. They understand the importance of staying well hydrated, taking antibiotics as prescribed to completion and using OTC pyridium as desired. She will PCP if any problems arise. ED Course:   Initial assessment performed. The patients presenting problems have been discussed, and they are in agreement with the care plan formulated and outlined with them. I have encouraged them to ask questions as they arise throughout their visit. Progress Note:   Updated pt on all returned results and findings. Discussed the importance of proper follow up as referred below along with return precautions. Pt in agreement with the care plan and expresses agreement with and understanding of all items discussed. Disposition:  1016  I have discussed with patient their diagnosis, treatment, and follow up plan. The patient agrees to follow up as outlined in discharge paperwork and also to return to the ED with any worsening. Majo Luna PA-C      PLAN:  1. Discharge Medication List as of 9/8/2019  9:54 AM      START taking these medications    Details   cephALEXin (KEFLEX) 500 mg capsule Take 1 Cap by mouth two (2) times a day for 7 days. , Normal, Disp-14 Cap, R-0         CONTINUE these medications which have NOT CHANGED    Details   multivit with min-folic acid (ADULT MULTIVITAMIN GUMMIES) 200 mcg chew Take  by mouth., Historical Med      sertraline (ZOLOFT) 50 mg tablet Take 1.5 tablets po daily, Normal, Disp-45 Tab, R-3      ibuprofen (MOTRIN) 800 mg tablet Take 1 Tab by mouth every eight (8) hours. , Normal, Disp-90 Tab, R-1      melatonin 1 mg tablet Take 1 mg by mouth., Historical Med      calcium carbonate (TUMS) 200 mg calcium (500 mg) chew Take 1 Tab by mouth daily. , Historical Med      ascorbate calcium (VITAMIN C PO) Take  by mouth., Historical Med           2. Follow-up Information     Follow up With Specialties Details Why [address] [facility]  Schedule an appointment as soon as possible for a visit in 1 week As needed, If symptoms worsen [address] [facility] [address] [address] [facility]  [phone]        Return to ED if worse     Diagnosis     Clinical Impression:   1. Acute cystitis with hematuria            Please note that this dictation was completed with Dragon, computer voice recognition software. Quite often unanticipated grammatical, syntax, homophones, and other interpretive errors are inadvertently transcribed by the computer software. Please disregard these errors. Additionally, please excuse any errors that have escaped final proofreading.

## 2019-09-08 NOTE — DISCHARGE INSTRUCTIONS

## 2019-09-08 NOTE — ED NOTES
Patient presents to ED with c/o urinary tract infection. Patient states that Romina Coates has recurrent UTI and she usually try to make her urine acidic so that it can kill the bacteria. \"          Emergency Department Nursing Plan of Care       The Nursing Plan of Care is developed from the Nursing assessment and Emergency Department Attending provider initial evaluation. The plan of care may be reviewed in the ED Provider note.     The Plan of Care was developed with the following considerations:   Patient / Family readiness to learn indicated by:verbalized understanding and successful return demonstration  Persons(s) to be included in education: patient  Barriers to Learning/Limitations:No    Signed     Adonis Landin RN    9/8/2019   8:44 AM

## 2019-09-09 LAB
C TRACH DNA SPEC QL NAA+PROBE: NEGATIVE
N GONORRHOEA DNA SPEC QL NAA+PROBE: NEGATIVE
SAMPLE TYPE: NORMAL
SERVICE CMNT-IMP: NORMAL
SPECIMEN SOURCE: NORMAL

## 2019-09-11 LAB
BACTERIA SPEC CULT: ABNORMAL
CC UR VC: ABNORMAL
SERVICE CMNT-IMP: ABNORMAL

## 2019-09-11 NOTE — PROGRESS NOTES
Left msg for pt to return phone call, keflex likely resistant.   Pt may need new abx if symptoms persisting

## 2019-09-12 RX ORDER — NITROFURANTOIN 25; 75 MG/1; MG/1
100 CAPSULE ORAL 2 TIMES DAILY
Qty: 14 CAP | Refills: 0 | Status: SHIPPED | OUTPATIENT
Start: 2019-09-12 | End: 2019-09-19

## 2020-08-11 ENCOUNTER — OFFICE VISIT (OUTPATIENT)
Dept: OBGYN CLINIC | Age: 25
End: 2020-08-11
Payer: COMMERCIAL

## 2020-08-11 VITALS
HEIGHT: 60 IN | WEIGHT: 201 LBS | DIASTOLIC BLOOD PRESSURE: 68 MMHG | BODY MASS INDEX: 39.46 KG/M2 | SYSTOLIC BLOOD PRESSURE: 110 MMHG

## 2020-08-11 DIAGNOSIS — Z01.419 WELL WOMAN EXAM: Primary | ICD-10-CM

## 2020-08-11 PROCEDURE — S0612 ANNUAL GYNECOLOGICAL EXAMINA: HCPCS | Performed by: OBSTETRICS & GYNECOLOGY

## 2020-08-11 RX ORDER — ETONOGESTREL AND ETHINYL ESTRADIOL 11.7; 2.7 MG/1; MG/1
1 INSERT, EXTENDED RELEASE VAGINAL
Qty: 3 EACH | Refills: 4 | Status: SHIPPED | OUTPATIENT
Start: 2020-08-11 | End: 2021-06-22 | Stop reason: ALTCHOICE

## 2020-08-11 NOTE — PROGRESS NOTES
Annual Physical Exam    Olu Cardona is a 21 y.o. presenting for annual exam and physical exam prior to breast explant surgery. Her main concern today is breast plant illness - reports multiple symptoms of fatigue, body aches, dry eyes, weight gain, etc which she feels all began after she had implants placed. Would like to have breast implants removed. Stopped breastfeeding about 1 week ago. Interested in restarting NuvaRing. Pt is otherwise doing well. Was trying to conceive until she decided to have surgery. Recent home UPT negative. Ob/Gyn Hx:   A0 -1  (TT 7lb9oz)  LMP-20  Menses- regular  Contraception-denies  STI- denies  ? SA-yes    Health maintenance:  Pap-17 NILM, denies h/o abnormal paps  Gardasil-3/3    Past Medical History:   Diagnosis Date    Anxiety     Borderline personality disorder ([facility] [address]     Degenerative disc disease at L5-S1 level     Depression     PCOS (polycystic ovarian syndrome)     Polycystic disease, ovaries        Past Surgical History:   Procedure Laterality Date    BREAST SURGERY PROCEDURE UNLISTED      Augmentation    HX BREAST AUGMENTATION  2017       Family History   Problem Relation Age of Onset    Cancer Mother         skin,thyroid,ovarian   Renate Andres Migraines Mother     Depression Mother     Anxiety Mother     Bipolar Disorder Mother     Other Father         prediabetes    Heart Disease Father     Hypertension Father     No Known Problems Sister     Other Brother         esbergers    Diabetes Paternal Uncle        Social History     Socioeconomic History    Marital status:      Spouse name: Not on file    Number of children: Not on file    Years of education: Not on file    Highest education level: Not on file   Occupational History    Not on file   Social Needs    Financial resource strain: Not on file    Food insecurity     Worry: Not on file     Inability: Not on file    Transportation needs     Medical: Not on file     Non-medical: Not on file   Tobacco Use    Smoking status: Never Smoker    Smokeless tobacco: Never Used   Substance and Sexual Activity    Alcohol use: No     Alcohol/week: 0.0 standard drinks     Comment: socially    Drug use: Yes     Types: Marijuana     Comment: couple of months ago    Sexual activity: Yes     Partners: Male     Birth control/protection: None   Lifestyle    Physical activity     Days per week: Not on file     Minutes per session: Not on file    Stress: Not on file   Relationships    Social connections     Talks on phone: Not on file     Gets together: Not on file     Attends Yarsani service: Not on file     Active member of club or organization: Not on file     Attends meetings of clubs or organizations: Not on file     Relationship status: Not on file    Intimate partner violence     Fear of current or ex partner: Not on file     Emotionally abused: Not on file     Physically abused: Not on file     Forced sexual activity: Not on file   Other Topics Concern     Service Not Asked    Blood Transfusions Not Asked    Caffeine Concern Not Asked    Occupational Exposure Not Asked   [place] Lavender Hazards Not Asked    Sleep Concern Not Asked    Stress Concern Not Asked    Weight Concern Not Asked    Special Diet Not Asked    Back Care Not Asked    Exercise Not Asked    Bike Helmet Not Asked    Seat Belt Not Asked    Self-Exams Not Asked   Social History Narrative    Partner - Nanette Higuera       Current Outpatient Medications   Medication Sig Dispense Refill    sertraline (ZOLOFT) 50 mg tablet Take 1.5 tablets po daily 45 Tab 3    ibuprofen (MOTRIN) 800 mg tablet Take 1 Tab by mouth every eight (8) hours. 90 Tab 1    melatonin 1 mg tablet Take 1 mg by mouth.  calcium carbonate (TUMS) 200 mg calcium (500 mg) chew Take 1 Tab by mouth daily.  ascorbate calcium (VITAMIN C PO) Take  by mouth.       multivit with min-folic acid (ADULT MULTIVITAMIN GUMMIES) 200 mcg chew Take  by mouth.          No Known Allergies    Review of Systems - History obtained from the patient  Constitutional: negative for weight loss, fever, night sweats  HEENT: negative for hearing loss, earache, congestion, snoring, sorethroat  CV: negative for chest pain, palpitations, edema  Resp: negative for cough, shortness of breath, wheezing  GI: negative for change in bowel habits, abdominal pain, black or bloody stools  : negative for frequency, dysuria, hematuria, vaginal discharge  MSK: negative for back pain, joint pain, muscle pain  Breast: negative for breast lumps, nipple discharge, galactorrhea  Skin :negative for itching, rash, hives  Neuro: negative for dizziness, headache, confusion, weakness  Psych: negative for anxiety, depression, change in mood  Heme/lymph: negative for bleeding, bruising, pallor    Physical Exam  Visit Vitals  /68 (BP 1 Location: Left arm, BP Patient Position: Sitting)   Ht 5' (1.524 m)   Wt 201 lb (91.2 kg)   LMP 08/01/2020   Breastfeeding No   BMI 39.26 kg/m²       Constitutional  · Appearance: well-nourished, well developed, alert, in no acute distress    HENT  · Head and Face: appears normal    Neck  · Inspection/Palpation: normal appearance, no masses or tenderness  · Lymph Nodes: no lymphadenopathy present  · Thyroid: gland size normal, nontender, no nodules or masses present on palpation    Chest  · Respiratory Effort: non-labored breathing  · Auscultation: CTAB, normal breath sounds    Cardiovascular  · Heart:  · Auscultation: regular rate and rhythm without murmur  · Extremities: no peripheral edema    Breasts  · Inspection of Breasts: breasts symmetrical, no skin changes, no discharge present, nipple appearance normal, no skin retraction present  · Palpation of Breasts and Axillae: no masses present on palpation, no breast tenderness (s/p implants)  · Axillary Lymph Nodes: no lymphadenopathy present    Gastrointestinal  · Abdominal Examination: abdomen non-tender to palpation, normal bowel sounds, no masses present  · Liver and spleen: no hepatomegaly present, spleen not palpable  · Hernias: no hernias identified    Genitourinary  · External Genitalia: normal appearance for age, no discharge present, no tenderness present, no inflammatory lesions present, no masses present, no atrophy present  · Vagina: normal vaginal vault without central or paravaginal defects, no discharge present, no inflammatory lesions present, no masses present  · Bladder: non-tender to palpation  · Urethra: appears normal  · Cervix: normal   · Uterus: normal size, shape and consistency  · Adnexa: no adnexal tenderness present, no adnexal masses present  · Perineum: perineum within normal limits, no evidence of trauma, no rashes or skin lesions present    Skin  · General Inspection: no rash, no lesions identified    Neurologic/Psychiatric  · Mental Status:  · Orientation: grossly oriented to person, place and time  · Mood and Affect: mood normal, affect appropriate      Assessment/Plan:  21 y.o.  presenting for AE and physical prior to breast explant surgery. Health maintenance:  -diet/exercise/healthy lifestyle  -pap today  -STI screen declined  -gardasil series completed  -Rx for NuvaRing  -advised pt recheck UPT prior to initiation of NuvaRing, and she should await next menstrual cycle prior to insertion.     RTC: 1 year for AE or sooner mateo Haq MD  2020  4:17 PM

## 2020-08-18 LAB
CYTOLOGIST CVX/VAG CYTO: NORMAL
CYTOLOGY CVX/VAG DOC CYTO: NORMAL
CYTOLOGY CVX/VAG DOC THIN PREP: NORMAL
DX ICD CODE: NORMAL
LABCORP, 190119: NORMAL
Lab: NORMAL
Lab: NORMAL
OTHER STN SPEC: NORMAL
STAT OF ADQ CVX/VAG CYTO-IMP: NORMAL

## 2020-12-10 ENCOUNTER — OFFICE VISIT (OUTPATIENT)
Dept: INTERNAL MEDICINE CLINIC | Age: 25
End: 2020-12-10
Payer: COMMERCIAL

## 2020-12-10 VITALS
HEIGHT: 60 IN | WEIGHT: 202.6 LBS | OXYGEN SATURATION: 98 % | BODY MASS INDEX: 39.78 KG/M2 | DIASTOLIC BLOOD PRESSURE: 75 MMHG | RESPIRATION RATE: 16 BRPM | TEMPERATURE: 98.5 F | SYSTOLIC BLOOD PRESSURE: 122 MMHG | HEART RATE: 74 BPM

## 2020-12-10 DIAGNOSIS — R53.82 CHRONIC FATIGUE: ICD-10-CM

## 2020-12-10 DIAGNOSIS — N92.6 IRREGULAR MENSES: ICD-10-CM

## 2020-12-10 DIAGNOSIS — N97.9 UNABLE TO GET PREGNANT, FEMALE: ICD-10-CM

## 2020-12-10 DIAGNOSIS — E28.2 PCOS (POLYCYSTIC OVARIAN SYNDROME): ICD-10-CM

## 2020-12-10 DIAGNOSIS — F33.0 DEPRESSION, MAJOR, RECURRENT, MILD (HCC): ICD-10-CM

## 2020-12-10 DIAGNOSIS — Z00.00 WELLNESS EXAMINATION: Primary | ICD-10-CM

## 2020-12-10 DIAGNOSIS — Z98.82 HX OF BREAST IMPLANTS, BILATERAL: ICD-10-CM

## 2020-12-10 DIAGNOSIS — E66.01 SEVERE OBESITY (HCC): ICD-10-CM

## 2020-12-10 LAB
A-G RATIO,AGRAT: 1.4 RATIO
ALBUMIN SERPL-MCNC: 4.5 G/DL (ref 3.9–5.4)
ALP SERPL-CCNC: 63 U/L (ref 38–126)
ALT SERPL-CCNC: 21 U/L (ref 0–35)
ANION GAP SERPL CALC-SCNC: 10 MMOL/L
AST SERPL W P-5'-P-CCNC: 25 U/L (ref 14–36)
BILIRUB SERPL-MCNC: 0.3 MG/DL (ref 0.2–1.3)
BILIRUB UR QL: NEGATIVE
BUN SERPL-MCNC: 15 MG/DL (ref 7–17)
BUN/CREATININE RATIO,BUCR: 21 RATIO
CALCIUM SERPL-MCNC: 9.6 MG/DL (ref 8.4–10.2)
CHLORIDE SERPL-SCNC: 104 MMOL/L (ref 98–107)
CHOL/HDL RATIO,CHHD: 3 RATIO (ref 0–4)
CHOLEST SERPL-MCNC: 100 MG/DL (ref 0–200)
CLARITY: CLEAR
CO2 SERPL-SCNC: 30 MMOL/L (ref 22–32)
COLOR UR: ABNORMAL
CREAT SERPL-MCNC: 0.7 MG/DL (ref 0.7–1.2)
ERYTHROCYTE [DISTWIDTH] IN BLOOD BY AUTOMATED COUNT: 13.9 %
GLOBULIN,GLOB: 3.2
GLUCOSE 24H UR-MRATE: NEGATIVE G/(24.H)
GLUCOSE SERPL-MCNC: 87 MG/DL (ref 65–105)
HCT VFR BLD AUTO: 43.1 % (ref 37–51)
HDLC SERPL-MCNC: 39 MG/DL (ref 35–130)
HGB BLD-MCNC: 13.6 G/DL (ref 12–18)
HGB UR QL STRIP: NEGATIVE
KETONES UR QL STRIP.AUTO: NEGATIVE
LDL/HDL RATIO,LDHD: 1 RATIO
LDLC SERPL CALC-MCNC: 52 MG/DL (ref 0–130)
LEUKOCYTE ESTERASE: NEGATIVE
LYMPHOCYTES ABSOLUTE: 2.4 K/UL (ref 0.6–4.1)
LYMPHOCYTES NFR BLD: 31.2 % (ref 10–58.5)
MCH RBC QN AUTO: 28 PG (ref 26–32)
MCHC RBC AUTO-ENTMCNC: 31.6 G/DL (ref 30–36)
MCV RBC AUTO: 88.7 FL (ref 80–97)
MONOCYTES ABS-DIF,2141: 0.5 K/UL (ref 0–1.8)
MONOCYTES NFR BLD: 6.5 % (ref 0.1–24)
NEUTROPHILS # BLD: 62.3 % (ref 37–92)
NEUTROPHILS ABS,2156: 4.9 K/UL (ref 2–7.8)
NITRITE UR QL STRIP.AUTO: NEGATIVE
PH UR STRIP: 7 [PH] (ref 5–7)
PLATELET # BLD AUTO: 249 K/UL (ref 140–440)
POTASSIUM SERPL-SCNC: 4 MMOL/L (ref 3.6–5)
PROT SERPL-MCNC: 7.7 G/DL (ref 6.3–8.2)
PROT UR STRIP-MCNC: NEGATIVE MG/DL
RBC # BLD AUTO: 4.86 M/UL (ref 4.2–6.3)
RBC #/AREA URNS HPF: 0 #/HPF
SODIUM SERPL-SCNC: 144 MMOL/L (ref 137–145)
SP GR UR REFRACTOMETRY: 1.01 (ref 1–1.03)
SQUAMOUS EPITHELIAL CELLS: ABNORMAL
TRIGL SERPL-MCNC: 45 MG/DL (ref 0–200)
TSH SERPL DL<=0.05 MIU/L-ACNC: 0.97 UIU/ML (ref 0.34–5.6)
UROBILINOGEN UR QL STRIP.AUTO: NEGATIVE
VLDLC SERPL CALC-MCNC: 9 MG/DL
WBC # BLD AUTO: 7.8 K/UL (ref 4.1–10.9)
WBC URNS QL MICRO: 0 #/HPF

## 2020-12-10 PROCEDURE — 81001 URINALYSIS AUTO W/SCOPE: CPT | Performed by: NURSE PRACTITIONER

## 2020-12-10 PROCEDURE — 85025 COMPLETE CBC W/AUTO DIFF WBC: CPT | Performed by: NURSE PRACTITIONER

## 2020-12-10 PROCEDURE — 80053 COMPREHEN METABOLIC PANEL: CPT | Performed by: NURSE PRACTITIONER

## 2020-12-10 PROCEDURE — 84443 ASSAY THYROID STIM HORMONE: CPT | Performed by: NURSE PRACTITIONER

## 2020-12-10 PROCEDURE — 80061 LIPID PANEL: CPT | Performed by: NURSE PRACTITIONER

## 2020-12-10 PROCEDURE — 99203 OFFICE O/P NEW LOW 30 MIN: CPT | Performed by: NURSE PRACTITIONER

## 2020-12-10 RX ORDER — LEVOCARNITINE 500 MG
TABLET ORAL
COMMUNITY
End: 2021-10-11

## 2020-12-10 NOTE — PROGRESS NOTES
HIPAA verified by two patient identifiers. Lucretia Huitron is a 31 y.o. female    Chief Complaint   Patient presents with    [facility] Care     new patient    Complete Physical       Visit Vitals  /75 (BP 1 Location: Left arm, BP Patient Position: Sitting)   Pulse 74   Temp 98.5 °F (36.9 °C) (Oral)   Resp 16   Ht 5' (1.524 m)   Wt 202 lb 9.6 oz (91.9 kg)   SpO2 98%   BMI 39.57 kg/m²       Pain Scale: 0 - No pain/10  Pain Location:       Health Maintenance Due   Topic Date Due    HPV Age 9Y-34Y (1 - 2-dose series) 10/11/2005    Flu Vaccine (1) 09/01/2020         Coordination of Care Questionnaire:  :   1) Have you been to an emergency room, urgent care, or hospitalized since your last visit? If yes, where when, and reason for visit? no       2. Have seen or consulted any other health care provider since your last visit? If yes, where when, and reason for visit? NO      Patient is accompanied by self I have received verbal consent from Lucretia Huitron to discuss any/all medical information while they are present in the room.

## 2020-12-10 NOTE — PROGRESS NOTES
Establish Care (new patient) and Complete Physical       HPI:     Dione Barraza is a 31y.o. year old female who is here to establish as a new patient, and for comprehensive personal healthcare exam.    The patient states that she would like further examination a work-up for her current health. She feels \"kind of okay\" overall, but has been having difficulty trying to get pregnant over the past 6 months. She was told that in the past she had \"polycystic ovarian syndrome,\" and was treated with Metformin and progesterone in order to get pregnant. She successfully became pregnant, and gave birth on 02/15/2019 to a baby girl. She states about 6 months ago she decided to try for another child, but has not been able to get pregnant since that time. She states her last Pap smear was approximately 2020. She has been unable to see obstetrician/gynecologist due to COVID-19 outbreak, as they are likely seeing pregnancy cases only at this time. Patient states she is a /Ab0. Patient also admits to precocious puberty and hirsutism in the past.  She is requesting lab work confirmation of her PCOS status at this time. Additionally, the patient admits to growing irregularities of her menstrual cycles, weight gain, and difficulty in concentration. She states some of this was caused by a history of silicone breast implants that were removed not too long ago. She admits to continued excessive fatigue, daytime somnolence, and a general feeling of being unwell. She states she eats very healthy, and exercises 2-3 times a week on average. However, she has continued to gain weight which is concerning her.          Visit Vitals  /75 (BP 1 Location: Left arm, BP Patient Position: Sitting)   Pulse 74   Temp 98.5 °F (36.9 °C) (Oral)   Resp 16   Ht 5' (1.524 m)   Wt 202 lb 9.6 oz (91.9 kg)   SpO2 98%   BMI 39.57 kg/m²       Past Medical History:   Diagnosis Date    Anxiety     Borderline personality disorder ([facility] [address]     Degenerative disc disease at L5-S1 level     Depression     PCOS (polycystic ovarian syndrome)     Polycystic disease, ovaries        Past Surgical History:   Procedure Laterality Date    BREAST SURGERY PROCEDURE UNLISTED  2017    Augmentation    HX BREAST AUGMENTATION  07/2017       Prior to Admission medications    Medication Sig Start Date End Date Taking? Authorizing Provider   niacin-inositol 400 mg niacin (500 mg) cap Take  by mouth. Yes Provider, Historical   ethinyl estradiol-etonogestrel (NuvaRing) 0.12-0.015 mg/24 hr vaginal ring 1 Each by Intravaginal route every thirty (30) days. Insert vaginal ring for 3 wks, remove for 1 wk. Repeat with new device each month. 8/11/20   Ingrid Lincoln MD   sertraline (ZOLOFT) 50 mg tablet Take 1.5 tablets po daily 5/28/19   Luis Eduardo Hernandez CNM   ibuprofen (MOTRIN) 800 mg tablet Take 1 Tab by mouth every eight (8) hours. 2/17/19   Luis Eduardo Hernandez CNM   melatonin 1 mg tablet Take 1 mg by mouth. Provider, Historical   calcium carbonate (TUMS) 200 mg calcium (500 mg) chew Take 1 Tab by mouth daily. Provider, Historical   ascorbate calcium (VITAMIN C PO) Take  by mouth. Provider, Historical   multivit with min-folic acid (ADULT MULTIVITAMIN GUMMIES) 200 mcg chew Take  by mouth. Provider, Historical        No Known Allergies     Social History     Socioeconomic History    Marital status:      Spouse name: Not on file    Number of children: Not on file    Years of education: Not on file    Highest education level: Not on file   Occupational History    Not on file   Social Needs    Financial resource strain: Not on file    Food insecurity     Worry: Not on file     Inability: Not on file    Transportation needs     Medical: Not on file     Non-medical: Not on file   Tobacco Use    Smoking status: Never Smoker    Smokeless tobacco: Never Used   Substance and Sexual Activity    Alcohol use:  No Alcohol/week: 0.0 standard drinks     Comment: socially    Drug use: Yes     Types: Marijuana     Comment: couple of months ago    Sexual activity: Yes     Partners: Male     Birth control/protection: None   Lifestyle    Physical activity     Days per week: Not on file     Minutes per session: Not on file    Stress: Not on file   Relationships    Social connections     Talks on phone: Not on file     Gets together: Not on file     Attends Rastafari service: Not on file     Active member of club or organization: Not on file     Attends meetings of clubs or organizations: Not on file     Relationship status: Not on file    Intimate partner violence     Fear of current or ex partner: Not on file     Emotionally abused: Not on file     Physically abused: Not on file     Forced sexual activity: Not on file   Other Topics Concern     Service Not Asked    Blood Transfusions Not Asked    Caffeine Concern Not Asked    Occupational Exposure Not Asked   Colton Valdez Hazards Not Asked    Sleep Concern Not Asked    Stress Concern Not Asked    Weight Concern Not Asked    Special Diet Not Asked    Back Care Not Asked    Exercise Not Asked    Bike Helmet Not Asked   [address] Not Asked    Self-Exams Not Asked   Social History Narrative    Partner - Clover Stands        ROS:     Constitutional: She denies fevers, weight loss, night sweats. Eyes: No blurred/double vision or photophobia. ENT: No difficulty with swallowing, taste, speech or smell. Respiratory: No cough, wheezing, or shortness of breath. Cardiovascular: Denies chest pain, palpitations, unexplained indigestion or syncope episodes. Gastrointestinal:  No changes in bowel movements, no abdominal pain, no bloating. Genitourinary:  She denies increased frequency, urgency, dysuria or nocturia. Extremities: She admits to intermittent myalgias of muscles and joints. She states this is chronic, and likely a side effect to her breast implants.   Neurological: No numbness, tingling, burning paresthesias or loss of motor strength. No syncope episodes, dizziness or new headaches. Skin:  No recent rashes, bruising, or mole changes. Psychiatric/Behavioral: Positive for history of depression. Hematologic: No easy bruising or bleeding gums  Lymphatic: No lymph node enlargement or night sweats  Endocrine: No increased urination, increased hunger, or increased thirst. No rapid weight change and no night sweats, hot/cold intolerance. Physical Examination:     Vitals:    12/10/20 1334   BP: 122/75   Pulse: 74   Resp: 16   Temp: 98.5 °F (36.9 °C)   TempSrc: Oral   SpO2: 98%   Weight: 202 lb 9.6 oz (91.9 kg)   Height: 5' (1.524 m)   PainSc:   0 - No pain        General appearance - Alert, well appearing, and in no distress  Mental status - Alert, oriented to person, place, and time  HEENT:  Ears - Bilateral TM's and external ear canals clear, cone of light present and normal. No perforations. Eyes - Pupillary responses normal.  Extraocular muscle function intact. Lids and conjunctiva not injected or icteric. Pharynx- Moist, pink without lesions. Teeth intact and in good repair without signs of caries. Neck - Supple without thyromegaly or bruit. No JVD noted. Lungs - Clear to auscultation and percussion. No crackles, wheezes, or abnormal breath sounds present. Cardiac- Normal rate, regular rhythm without murmur. PMI not displaced. No rub or gallop noted. Abdomen - Flat, soft, non-tender without palpable organomegaly or mass. No pulsatile mass was felt, and not bruit was heard. Bowel sounds were active   Female -deferred. Rectal -deferred. Extremities -  No clubbing cyanosis or edema  Lymphatics - No palpable lymphadenopathy, no hepatosplenomegaly  Skin - No rashes, bruises, or unusual mole change noted  Neurological - Cranial nerves II-XII grossly intact. Motor strength 5/5. DTR's 2+ and symmetric.   Station and gait normal  Psychiatric -patient speech has normal rate and rhythm and responds to questions appropriately. The patient's affect is appropriate and mood is appropriate. Memory is intact to immediate, recent and remote. Patient's thought content and thought processing appear to be within normal limits. Assessment/Plan:     Orders Placed This Encounter    niacin-inositol 400 mg niacin (500 mg) cap     Sig: Take  by mouth. ICD-10-CM ICD-9-CM    1. Severe obesity ([facility] [address]  E66.01 278.01    2. Depression, major, recurrent, mild (HCC)  F33.0 296.31    3. PCOS (polycystic ovarian syndrome)  E28.2 256.4 PROLACTIN      PROGESTERONE      FSH AND LH      TESTOSTERONE, FREE & TOTAL      DHEA SULFATE   4. Hx of breast implants, bilateral  Z98.82 V43.82    5. Irregular menses  N92.6 626.4    6. Wellness examination  Z00.00 V70.0 CBC WITH AUTOMATED DIFF      METABOLIC PANEL, COMPREHENSIVE      LIPID PANEL      URINALYSIS W/MICROSCOPIC   7. Chronic fatigue  R53.82 780.79 TSH 3RD GENERATION     1: Per patient request I will work her up for PCOS. Labs will include CBC, CMP, TSH, urinalysis, lipid panel, prolactin, DHEA-S, testosterone total and free, LH/FSH.  2: Patient not a candidate for psychotropics for treatment of depression at this time due to seeking pregnancy. 3: Patient to continue all naturopathic's and homeopathic remedies. Add multivitamin as discussed. 4: Patient to follow-up dependent on labs, but if normal, may follow-up in 6 months. Patient states understanding and agrees with plan. I have reviewed the patient's medical history in detail and updated the computerized patient record. We had a prolonged discussion about these complex clinical issues and went over the various important aspects to consider. All questions were answered.      Advised her to call back or return to office if symptoms do not improve, change in nature, or persist.    She was given an after visit summary or informed of [facility] which includes patient instructions, diagnoses, current medications, & vitals. She expressed understanding with the diagnosis and plan.

## 2020-12-13 LAB
DHEA-S SERPL-MCNC: 402 UG/DL (ref 84.8–378)
FSH SERPL-ACNC: 4.8 MIU/ML
LH SERPL-ACNC: 4.3 MIU/ML
PROGEST SERPL-MCNC: 0.4 NG/ML
PROLACTIN SERPL-MCNC: 6.9 NG/ML (ref 4.8–23.3)
TESTOST FREE SERPL-MCNC: 7.2 PG/ML (ref 0–4.2)
TESTOST SERPL-MCNC: 62 NG/DL (ref 8–48)

## 2020-12-14 NOTE — PROGRESS NOTES
Urine appears okay. Cholesterol levels are in good standing. Kidney function, liver functions, and electrolytes all appear within normal limits. Blood levels show no signs of anemia or leukemia. Thyroid appears to be functioning normally. Prolactin and progesterone appear to be in normal range. Luteinizing hormone and FSH are normal.    Testosterone levels and DHEA levels are abnormally high. This is likely consistent with PCOS.      At this point, I would suggest discussing results with your OB/GYN or reproductive endocrinologist.

## 2021-04-12 ENCOUNTER — OFFICE VISIT (OUTPATIENT)
Dept: OBGYN CLINIC | Age: 26
End: 2021-04-12
Payer: COMMERCIAL

## 2021-04-12 VITALS — BODY MASS INDEX: 40.04 KG/M2 | WEIGHT: 205 LBS | SYSTOLIC BLOOD PRESSURE: 108 MMHG | DIASTOLIC BLOOD PRESSURE: 82 MMHG

## 2021-04-12 DIAGNOSIS — Z31.69 INFERTILITY COUNSELING: ICD-10-CM

## 2021-04-12 DIAGNOSIS — N92.6 IRREGULAR PERIODS/MENSTRUAL CYCLES: Primary | ICD-10-CM

## 2021-04-12 PROCEDURE — 99213 OFFICE O/P EST LOW 20 MIN: CPT | Performed by: ADVANCED PRACTICE MIDWIFE

## 2021-04-12 RX ORDER — OMEGA-3-ACID ETHYL ESTERS 1 G/1
2 CAPSULE, LIQUID FILLED ORAL 2 TIMES DAILY
COMMUNITY
End: 2021-10-11

## 2021-04-12 RX ORDER — ZINC GLUCONATE 10 MG
LOZENGE ORAL
COMMUNITY
End: 2021-10-11

## 2021-04-12 NOTE — PROGRESS NOTES
Xuan Dutta is a 31 y.o. female who complains of trying to conceive for 1 year after using ovulation kits. Her current method of family planning is trying to conceive. The patient is sexually active. She developed this problem approximately 1 year ago. Saw Marquis Lama in 8/2020 for AE- wanted to go back on birth control until her explant surgery   Pt then saw PCP in December and stated she had been trying for a couple of months-  Pt states she is certain she has PCOS and this is causing her to have difficulties conceiving. Pt has gain weight since the birth of her daughter 2 years ago- pt weighted 1 at birth now weighs 1. Pt states she has been to 5 different MDs while waiting to get into see me for this visit. Has been on metformin in the past but only for 4 days - she conceived in those 4 days.      Her relevant past medical history:   Past Medical History:   Diagnosis Date    Anxiety     Borderline personality disorder ([address]     Degenerative disc disease at L5-S1 level     Depression     PCOS (polycystic ovarian syndrome)     Polycystic disease, ovaries     Unable to get pregnant, female 12/10/2020        Past Surgical History:   Procedure Laterality Date    HX BREAST AUGMENTATION  07/2017    ID BREAST SURGERY PROCEDURE UNLISTED  2017    Augmentation     Social History     Occupational History    Not on file   Tobacco Use    Smoking status: Never Smoker    Smokeless tobacco: Never Used   Substance and Sexual Activity    Alcohol use: No     Alcohol/week: 0.0 standard drinks     Comment: socially    Drug use: Yes     Types: Marijuana     Comment: couple of months ago    Sexual activity: Yes     Partners: Male     Birth control/protection: None     Family History   Problem Relation Age of Onset    Cancer Mother         skin,thyroid,ovarian   Dot Jesusita Migraines Mother     Depression Mother     Anxiety Mother     Bipolar Disorder Mother     Other Father         prediabetes    Heart Disease Father     Hypertension Father     No Known Problems Sister     Other Brother         jayne    Diabetes Paternal Uncle        No Known Allergies  Prior to Admission medications    Medication Sig Start Date End Date Taking? Authorizing Provider   niacin-inositol 400 mg niacin (500 mg) cap Take  by mouth. Provider, Historical   ethinyl estradiol-etonogestrel (NuvaRing) 0.12-0.015 mg/24 hr vaginal ring 1 Each by Intravaginal route every thirty (30) days. Insert vaginal ring for 3 wks, remove for 1 wk. Repeat with new device each month. 8/11/20   Gigi Weems MD   sertraline (ZOLOFT) 50 mg tablet Take 1.5 tablets po daily 5/28/19   Ace Cola, CNM   ibuprofen (MOTRIN) 800 mg tablet Take 1 Tab by mouth every eight (8) hours. 2/17/19   Ace Cola, CNM   melatonin 1 mg tablet Take 1 mg by mouth. Provider, Historical   calcium carbonate (TUMS) 200 mg calcium (500 mg) chew Take 1 Tab by mouth daily. Provider, Historical   ascorbate calcium (VITAMIN C PO) Take  by mouth. Provider, Historical   multivit with min-folic acid (ADULT MULTIVITAMIN GUMMIES) 200 mcg chew Take  by mouth.     Provider, Historical        Review of Systems - History obtained from the patient  Constitutional: negative for weight loss, fever, night sweats  HEENT: negative for hearing loss, earache, congestion, snoring, sorethroat  CV: negative for chest pain, palpitations, edema  Resp: negative for cough, shortness of breath, wheezing  Breast: negative for breast lumps, nipple discharge, galactorrhea  GI: negative for change in bowel habits, abdominal pain, black or bloody stools  : negative for frequency, dysuria, hematuria  MSK: negative for back pain, joint pain, muscle pain  Skin: negative for itching, rash, hives  Neuro: negative for dizziness, headache, confusion, weakness  Psych: negative for anxiety, depression, change in mood  Heme/lymph: negative for bleeding, bruising, pallor      Objective: There were no vitals taken for this visit. PHYSICAL EXAMINATION    Constitutional  · Appearance: well-nourished, well developed, alert, in no acute distress    HENT  · Head and Face: appears normal    Neck  · Inspection/Palpation: normal appearance    Skin  · General Inspection: no rash, no lesions identified    Neurologic/Psychiatric  · Mental Status:  · Orientation: grossly oriented to person, place and time  · Mood and Affect: mood normal, affect appropriate    Assessment/Plan:   infertility- labs PCOS- day 21 and day 3     Day 3 labs FSH, LH, estradiol, TSH, HA1C, testosterone- free and T4, DHEA    Day 21 - progesterone      Patient declines presence of chaperone during today's visit.       Mahesh Patterson CNM

## 2021-04-12 NOTE — PATIENT INSTRUCTIONS
Learning About Planning for Future Pregnancy How can you plan for pregnancy? Even before you get pregnant, you can help make your pregnancy as healthy as possible. Take these steps: 
· See a doctor or certified nurse-midwife for an exam. Talk about the medicines, vitamins, and herbs you take. Discuss any health problems or concerns you have. · Don't take nonsteroidal anti-inflammatory drugs (NSAIDs) unless your doctor says it's okay. Examples of these are ibuprofen and aspirin. They can raise your risk of miscarriage. · Take a daily multivitamin or prenatal vitamin. Make sure it has folic acid. This will lower the chance of having a baby with a birth defect. · Keep track of your menstrual cycle. This is a good idea for a few reasons. It helps you know the best time to try to get pregnant. And it can help your doctor or midwife figure out when your baby is due and how it is growing. · Make healthy choices. Eat well. Avoid caffeine. Or cut back and only have 1 cup of coffee or tea a day. Avoid alcohol, cigarettes, marijuana, and illegal drugs. Take only the medicines your doctor or midwife says are okay. · Get plenty of exercise. A strong body will make pregnancy and birth easier. It will also help you recover after the birth. And exercise can help improve your mood. What other exams or tests should you have? Before trying to get pregnant, take care of any other health concerns you might have. · If you have diabetes or high blood pressure or you are obese, talk to your doctor before you get pregnant. Together, you can make a plan about how to control these health problems. · Talk with your doctor about any medicines you take. Find out if it is safe to keep taking them while you are pregnant. · Get any vaccines you might need. They can help prevent birth defects, miscarriage, or stillbirth that can be caused by infections such as rubella or measles.  Ask your doctor how long you should wait after you get a vaccine before you try to get pregnant. · Talk with your doctor about whether to have screening tests for diseases that are passed down through families (genetic conditions). These diseases include: ? Cystic fibrosis. ? Sickle cell disease. ? Devaughn-Sachs disease. If you think you might be pregnant · You can use a home pregnancy test as soon as the first day of your first missed menstrual period. · As soon as you know you're pregnant, make an appointment with your doctor or certified nurse-midwife. Your first prenatal visit will provide information that can be used to check for any problems as your pregnancy progresses. Where can you learn more? Go to http://www.gray.com/ Enter X937 in the search box to learn more about \"Learning About Planning for Future Pregnancy. \" Current as of: October 8, 2020               Content Version: 12.8 © 4647-1932 [employer]wise, [employer]orporated. Care instructions adapted under license by [employer] (which disclaims liability or warranty for this information). If you have questions about a medical condition or this instruction, always ask your healthcare professional. [address] any warranty or liability for your use of this information.

## 2021-04-19 ENCOUNTER — LAB ONLY (OUTPATIENT)
Dept: OBGYN CLINIC | Age: 26
End: 2021-04-19

## 2021-04-19 DIAGNOSIS — Z31.41 FERTILITY TESTING: Primary | ICD-10-CM

## 2021-04-20 LAB
FSH SERPL-ACNC: 4.5 MIU/ML
LH SERPL-ACNC: 6.5 MIU/ML
T4 FREE SERPL-MCNC: 1.1 NG/DL (ref 0.8–1.5)
TSH SERPL DL<=0.05 MIU/L-ACNC: 0.8 UIU/ML (ref 0.36–3.74)

## 2021-04-21 LAB — ESTRADIOL SERPL-MCNC: 7.5 PG/ML

## 2021-04-21 NOTE — PROGRESS NOTES
Lani Zhao,    Most of the lab results have come back- I am still waiting to see your results for your overian reserve and then wait for you day 21 progesterone labs-  After that we can chat to review all the labs together for a bigger picture.      Edward

## 2021-04-26 LAB — MIS SERPL-MCNC: 3.35 NG/ML

## 2021-05-07 ENCOUNTER — LAB ONLY (OUTPATIENT)
Dept: OBGYN CLINIC | Age: 26
End: 2021-05-07

## 2021-05-07 ENCOUNTER — PATIENT MESSAGE (OUTPATIENT)
Dept: OBGYN CLINIC | Age: 26
End: 2021-05-07

## 2021-05-07 DIAGNOSIS — Z31.41 FERTILITY TESTING: Primary | ICD-10-CM

## 2021-05-08 LAB — PROGEST SERPL-MCNC: 0.8 NG/ML

## 2021-05-17 ENCOUNTER — TELEPHONE (OUTPATIENT)
Dept: OBGYN CLINIC | Age: 26
End: 2021-05-17

## 2021-05-17 NOTE — TELEPHONE ENCOUNTER
Pt is having constant diarrhea and stomach pains on Metformin and wants to know what she can take as an alternative.

## 2021-05-19 RX ORDER — METFORMIN HYDROCHLORIDE 500 MG/1
TABLET, EXTENDED RELEASE ORAL
Qty: 90 TABLET | Refills: 2 | Status: SHIPPED | OUTPATIENT
Start: 2021-05-19 | End: 2021-11-08

## 2021-05-19 NOTE — TELEPHONE ENCOUNTER
I called pt to discuss side effects     Metformin for 2 months work up too 1000 mg BID     For 2 months then pt will message to say how she is feeling and if she is having regualr cycles    If she has not cycled try progestin challenge to cycle then do day 3 and day 21 labs  If cycling do day 3 and 21 labs    Then decide if need for clomid or letrazole.      Pt is also making healthy life style changes     Pt feels comfortable with this plan     Neville Hall CNM

## 2021-06-22 ENCOUNTER — OFFICE VISIT (OUTPATIENT)
Dept: OBGYN CLINIC | Age: 26
End: 2021-06-22
Payer: COMMERCIAL

## 2021-06-22 VITALS — DIASTOLIC BLOOD PRESSURE: 70 MMHG | WEIGHT: 203 LBS | BODY MASS INDEX: 39.65 KG/M2 | SYSTOLIC BLOOD PRESSURE: 100 MMHG

## 2021-06-22 DIAGNOSIS — N92.6 MISSED MENSES: Primary | ICD-10-CM

## 2021-06-22 LAB — HCG SERPL-ACNC: 1161 MIU/ML (ref 0–6)

## 2021-06-22 PROCEDURE — 99213 OFFICE O/P EST LOW 20 MIN: CPT | Performed by: ADVANCED PRACTICE MIDWIFE

## 2021-06-22 NOTE — PROGRESS NOTES
Brittanie Cox is a 31 y.o. female who complains of missed menses  around May this past year with c/o increased uterine cramping. Pt started metformin in May - had a positive home pregnancy test   Here to day for lab work to figure out how far along she is- pt has a hx of irregular cycles        Her relevant past medical history:   Past Medical History:   Diagnosis Date    Anxiety     Borderline personality disorder ([address]     Depression     History of removal of left breast implant 09/2020    History of removal of right breast implant 09/2020    PCOS (polycystic ovarian syndrome)     Polycystic disease, ovaries     Unable to get pregnant, female 12/10/2020        Past Surgical History:   Procedure Laterality Date    HX BREAST AUGMENTATION  07/2017    [place] BREAST SURGERY PROCEDURE UNLISTED  2017    Augmentation    [place] REMOVAL INTACT BREAST IMPLANT  09/2020    bilateral breast implant removal     Social History     Occupational History    Not on file   Tobacco Use    Smoking status: Never Smoker    Smokeless tobacco: Never Used   Substance and Sexual Activity    Alcohol use: Not Currently     Alcohol/week: 0.0 standard drinks     Comment: 1 x/year    Drug use: Not Currently     Types: Marijuana     Comment: couple of months ago    Sexual activity: Yes     Partners: Male     Birth control/protection: None     Family History   Problem Relation Age of Onset    Cancer Mother         skin,thyroid,ovarian   Alisha.Lacer Migraines Mother     Depression Mother     Anxiety Mother     Bipolar Disorder Mother     Other Father         prediabetes    Heart Disease Father     Hypertension Father     No Known Problems Sister     Other Brother         esbergers    Diabetes Paternal Uncle        No Known Allergies  Prior to Admission medications    Medication Sig Start Date End Date Taking? Authorizing Provider   PNV [id number] fum/folic ac (PRENATAL PO) Take  by mouth.    Yes Provider, Historical   metFORMIN ER (GLUCOPHAGE XR) 500 mg tablet Take 1 tab every night for 2 weeks then increase to 1 tab BID for 2 weeks then 1 tab in the am followed by 2 tabs in pm for 2 weeks then increase to 2 tabs BID 5/19/21  Yes Maurene Hodgkin, CNM   magnesium 250 mg tab Take  by mouth. Yes Provider, Historical   omega-3 acid ethyl esters (LOVAZA) 1 gram capsule Take 2 g by mouth two (2) times a day. Patient not taking: Reported on 6/22/2021    Provider, Historical   niacin-inositol 400 mg niacin (500 mg) cap Take  by mouth. Patient not taking: Reported on 6/22/2021    Provider, Historical   ethinyl estradiol-etonogestrel (NuvaRing) 0.12-0.015 mg/24 hr vaginal ring 1 Each by Intravaginal route every thirty (30) days. Insert vaginal ring for 3 wks, remove for 1 wk. Repeat with new device each month. 8/11/20   Nadya Coburn MD   sertraline (ZOLOFT) 50 mg tablet Take 1.5 tablets po daily 5/28/19   Maurene Hodgkin, CNM   ibuprofen (MOTRIN) 800 mg tablet Take 1 Tab by mouth every eight (8) hours. 2/17/19   Maurene Hodgkin, CNM   melatonin 1 mg tablet Take 1 mg by mouth. Provider, Historical   calcium carbonate (TUMS) 200 mg calcium (500 mg) chew Take 1 Tab by mouth daily. Provider, Historical   ascorbate calcium (VITAMIN C PO) Take  by mouth. Provider, Historical   multivit with min-folic acid (ADULT MULTIVITAMIN GUMMIES) 200 mcg chew Take  by mouth.     Provider, Historical        Review of Systems - History obtained from the patient  Constitutional: negative for weight loss, fever, night sweats  HEENT: negative for hearing loss, earache, congestion, snoring, sorethroat  CV: negative for chest pain, palpitations, edema  Resp: negative for cough, shortness of breath, wheezing  Breast: negative for breast lumps, nipple discharge, galactorrhea  GI: negative for change in bowel habits, abdominal pain, black or bloody stools  : negative for frequency, dysuria, hematuria  MSK: negative for back pain, joint pain, muscle pain  Skin: negative for itching, rash, hives  Neuro: negative for dizziness, headache, confusion, weakness  Psych: negative for anxiety, depression, change in mood  Heme/lymph: negative for bleeding, bruising, pallor      Objective:  Visit Vitals  Wt 203 lb (92.1 kg)   LMP  (LMP Unknown)   BMI 39.65 kg/m²          PHYSICAL EXAMINATION    Constitutional  · Appearance: well-nourished, well developed, alert, in no acute distress    HENT  · Head and Face: appears normal    Neck  · Inspection/Palpation: normal appearance        Skin  · General Inspection: no rash, no lesions identified    Neurologic/Psychiatric  · Mental Status:  · Orientation: grossly oriented to person, place and time  · Mood and Affect: mood normal, affect appropriate    Assessment:    amenorrhea - positive office pregnancy test  Likely less than 6 weeks per LMP in may 2021- BHCG and progesterone today- office will call with results for next steps    Plan:     Patient declines presence of chaperone during today's visit.      Johanny Laguna CNM

## 2021-06-23 LAB — PROGEST SERPL-MCNC: 11.9 NG/ML

## 2021-06-23 NOTE — PROGRESS NOTES
Joy Brown,    Your are pregnant for sure! Approximately 3 weeks :)    You can schedule an apt for a new pregnancy ultrasound in about 5 weeks :)    CONGRATULATIONS!   Edward

## 2021-07-13 ENCOUNTER — OFFICE VISIT (OUTPATIENT)
Dept: OBGYN CLINIC | Age: 26
End: 2021-07-13

## 2021-07-13 VITALS — SYSTOLIC BLOOD PRESSURE: 98 MMHG | DIASTOLIC BLOOD PRESSURE: 70 MMHG | WEIGHT: 203 LBS | BODY MASS INDEX: 39.65 KG/M2

## 2021-07-13 DIAGNOSIS — Z3A.01 LESS THAN 8 WEEKS GESTATION OF PREGNANCY: ICD-10-CM

## 2021-07-13 DIAGNOSIS — Z36.9 UNSPECIFIED ANTENATAL SCREENING: Primary | ICD-10-CM

## 2021-07-13 LAB
ABO + RH BLD: NORMAL
BASOPHILS # BLD: 0 K/UL (ref 0–0.1)
BASOPHILS NFR BLD: 0 % (ref 0–1)
BLOOD GROUP ANTIBODIES SERPL: NORMAL
COMMENT, HOLDF: NORMAL
DIFFERENTIAL METHOD BLD: NORMAL
EOSINOPHIL # BLD: 0.1 K/UL (ref 0–0.4)
EOSINOPHIL NFR BLD: 1 % (ref 0–7)
ERYTHROCYTE [DISTWIDTH] IN BLOOD BY AUTOMATED COUNT: 13.5 % (ref 11.5–14.5)
HBV SURFACE AG SER QL: <0.1 INDEX
HBV SURFACE AG SER QL: NEGATIVE
HCT VFR BLD AUTO: 42.5 % (ref 35–47)
HGB BLD-MCNC: 14 G/DL (ref 11.5–16)
HIV 1+2 AB+HIV1 P24 AG SERPL QL IA: NONREACTIVE
HIV12 RESULT COMMENT, HHIVC: NORMAL
IMM GRANULOCYTES # BLD AUTO: 0 K/UL (ref 0–0.04)
IMM GRANULOCYTES NFR BLD AUTO: 0 % (ref 0–0.5)
LYMPHOCYTES # BLD: 2.1 K/UL (ref 0.8–3.5)
LYMPHOCYTES NFR BLD: 22 % (ref 12–49)
MCH RBC QN AUTO: 29.2 PG (ref 26–34)
MCHC RBC AUTO-ENTMCNC: 32.9 G/DL (ref 30–36.5)
MCV RBC AUTO: 88.5 FL (ref 80–99)
MONOCYTES # BLD: 0.6 K/UL (ref 0–1)
MONOCYTES NFR BLD: 7 % (ref 5–13)
NEUTS SEG # BLD: 6.5 K/UL (ref 1.8–8)
NEUTS SEG NFR BLD: 70 % (ref 32–75)
NRBC # BLD: 0 K/UL (ref 0–0.01)
NRBC BLD-RTO: 0 PER 100 WBC
PLATELET # BLD AUTO: 282 K/UL (ref 150–400)
PMV BLD AUTO: 11.4 FL (ref 8.9–12.9)
RBC # BLD AUTO: 4.8 M/UL (ref 3.8–5.2)
RUBV IGG SER-IMP: REACTIVE
RUBV IGG SERPL IA-ACNC: 24.1 IU/ML
SAMPLES BEING HELD,HOLD: NORMAL
SPECIMEN EXP DATE BLD: NORMAL
WBC # BLD AUTO: 9.3 K/UL (ref 3.6–11)

## 2021-07-13 PROCEDURE — 0502F SUBSEQUENT PRENATAL CARE: CPT | Performed by: ADVANCED PRACTICE MIDWIFE

## 2021-07-13 NOTE — PATIENT INSTRUCTIONS
Weeks 6 to 10 of Your Pregnancy: Care Instructions  Overview     During the first 6 to 10 weeks of your pregnancy, your body goes through many changes. Your baby grows very quickly, even though you can't feel it yet. You may start to feel different, both in your body and your emotions. Because each pregnancy is unique, there's no right way to feel. You may feel the healthiest you've ever been, or you might feel tired or sick to your stomach (\"morning sickness\"). These early weeks are a time to make healthy choices and to eat the best foods for you and your baby. This is also a good time to think about birth defects testing. These are tests done during pregnancy to look for possible problems with the baby. First-trimester tests for birth defects can be done between 8 and 13 weeks of pregnancy, depending on the test. Talk with your doctor about what kinds of tests are available. Follow-up care is a key part of your treatment and safety. Be sure to make and go to all appointments, and call your doctor if you are having problems. It's also a good idea to know your test results and keep a list of the medicines you take. How can you care for yourself at home? Eat well  · Eat at least 3 meals and 2 healthy snacks every day. Eat fresh, whole foods, including:  ? 7 or more servings of bread, tortillas, cereal, rice, pasta, or oatmeal.  ? 3 or more servings of vegetables, especially leafy green vegetables. ? 2 or more servings of fruits. ? 3 or more servings of milk, yogurt, or cheese. ? 2 or more servings of meat, turkey, chicken, fish, eggs, or dried beans. · Drink plenty of fluids, especially water. Avoid sodas and other sweetened drinks. · Choose foods that have important vitamins for your baby, such as calcium, iron, and folate. ? Dairy products, tofu, canned fish with bones, almonds, broccoli, dark leafy greens, corn tortillas, and fortified orange juice are good sources of calcium. ?  Beef, poultry, liver, spinach, lentils, dried beans, fortified cereals, and dried fruits are rich in iron. ? Dark leafy greens, broccoli, asparagus, liver, fortified cereals, orange juice, peanuts, and almonds are good sources of folate. · Avoid foods that could harm your baby. ? Do not eat raw or undercooked meat, chicken, or fish (such as sushi or raw oysters). ? Do not eat raw eggs or foods that contain raw eggs, such as Caesar dressing. ? Do not eat soft cheeses and unpasteurized dairy foods, such as Brie, feta, or blue cheese. ? Do not eat fish that contains a lot of mercury, such as shark, swordfish, tilefish, or samaria mackerel. Do not eat more than 6 ounces of tuna each week. ? Do not eat raw sprouts, especially alfalfa sprouts. ? Cut down on caffeine, such as coffee, tea, and cola. Protect yourself and your baby  · Do not touch elizabeth litter or cat feces. They can cause an infection that could harm your baby. · High body temperature can be harmful to your baby. So if you want to use a sauna or hot tub, be sure to talk to your doctor about how to use it safely. [place] with morning sickness  · Sip small amounts of water, juices, or shakes. Try drinking between meals, not with meals. · Eat 5 or 6 small meals a day. Try dry toast or crackers when you first get up, and eat breakfast a little later. · Avoid spicy, greasy, and fatty foods. · When you feel sick, open your windows or go for a short walk to get fresh air. · Try nausea wristbands. These help some women. · Tell your doctor if you think your prenatal vitamins make you sick. Where can you learn more? Go to http://www.gray.com/  Enter G112 in the search box to learn more about \"Weeks 6 to 10 of Your Pregnancy: Care Instructions. \"  Current as of: October 8, 2020               Content Version: 12.8  © 0099-4841 [employer].    Care instructions adapted under license by [employer] (which disclaims liability or warranty for this information). If you have questions about a medical condition or this instruction, always ask your healthcare professional. [place] [address] any warranty or liability for your use of this information.

## 2021-07-13 NOTE — PROGRESS NOTES
Current pregnancy history:    Hao Atkins is a  31 y.o. female WHITE/NON- No LMP recorded (lmp unknown). Patient is pregnant. .  She presents for the evaluation of amenorrhea and a positive pregnancy test.    LMP history:  The date of her LMP is  certain. Her last menstrual period was normal.  A urine pregnancy test was positive      Based on her LMP, her EDC is 22 and her EGA is 7 weeks,5 days. Her menstrual cycles are regular and occur approximately every 28 days  and range from 3 to 5 days. Ultrasound data:  She had an  ultrasound done by the ultrasound tech today which revealed a viable meadows pregnancy with a gestational age of 9 weeks and 5 days giving an EDC of 22. TV ULTRASOUND PERFORMED 21  A SINGLE VIABLE 7W5D WITH YAIR OF 2022 IUP IS SEEN WITH NORMAL CARDIAC RHYTHM. [facility]. A NORMAL YOLK SAC IS SEEN. RIGHT OVARY APPEARS WITHIN NORMAL LIMITS. LEFT OVARY APPEARS WITHIN NORMAL LIMITS. NO FREE FLUID IS SEEN IN THE CDS. Pregnancy symptoms:    Since her LMP she has experienced  urinary frequency, breast tenderness, and nausea. She has not been vomiting over the last few weeks. Associated signs and symptoms which she denies: dysuria, discharge, vaginal bleeding. Relevant past pregnancy history:   She has the following pregnancy history: Her last pregnancy was uncomplicated. She has no history of  delivery. Relevant past medical history:(relevant to this pregnancy): noncontributory. Pap/Occupational history:  Last pap smear: last year Results: Normal            Substance history: negative for alcohol, tobacco and street drugs. Positive for nothing. Exposure history: There is/are no indoor cat/s in the home. The patient was instructed to not change the cat litter. She admits close contact with children on a regular basis.    She has had chicken pox or the vaccine in the past. Patient denies issues with domestic violence. Genetic Screening/Teratology Counseling: (Includes patient, baby's father, or anyone in either family with:)  3.  Patient's age >/= 28 at EDC?--no  2. Thalassemia ([facility] [place] [address], or  background): MCV<80?--no.     3.  Neural tube defect (meningomyelocele, spina bifida, anencephaly)?--no.   4.  Congenital heart defect?--no.  5.  Down syndrome?--no.   6.  Devaughn-Sachs ([place], [place])?--no.   7.  Canavan's Disease?--no.   8.  Familial Dysautonomia?--no.   9.  Sickle cell disease or trait ()? --no   The patient has not been tested for sickle trait  10. Hemophilia or other blood disorders?--no. 11.  Muscular dystrophy?--no. 12.  Cystic fibrosis?--no. 13.  [place]'s Chorea?--no. 14.  Mental retardation/autism (if yes was person tested for Fragile X)?--no. 15.  Other inherited genetic or chromosomal disorder?--no. 12.  Maternal metabolic disorder (DM, PKU, etc)?--no. 17.  Patient or FOB with a child with a birth defect not listed above?--no.  17a. Patient or FOB with a birth defect themselves?--no. 18.  Recurrent pregnancy loss, or stillbirth?--no. 19.  Any medications since LMP other than prenatal vitamins (include vitamins, supplements, OTC meds, drugs, alcohol)?--no. 20.  Any other genetic/environmental exposure to discuss?--no. Infection History:  1. Lives with someone with TB or TB exposed?--no.   2.  Patient or partner has history of genital herpes?--no.  3.  Rash or viral illness since LMP?--no.    4.  History of STD (GC, CT, HPV, syphilis, HIV)? --no   5. Other: OTHER?       Past Medical History:   Diagnosis Date    Anxiety     Borderline personality disorder ([facility] [address]     Depression     History of removal of left breast implant 09/2020    History of removal of right breast implant 09/2020    PCOS (polycystic ovarian syndrome)     Polycystic disease, ovaries     Unable to get pregnant, female 12/10/2020 Past Surgical History:   Procedure Laterality Date    HX BREAST AUGMENTATION  2017    [place] BREAST SURGERY PROCEDURE UNLISTED  2017    Augmentation    [place] REMOVAL INTACT BREAST IMPLANT  2020    bilateral breast implant removal     Social History     Occupational History    Not on file   Tobacco Use    Smoking status: Never Smoker    Smokeless tobacco: Never Used   Substance and Sexual Activity    Alcohol use: Not Currently     Alcohol/week: 0.0 standard drinks     Comment: 1 x/year    Drug use: Not Currently     Types: Marijuana     Comment: couple of months ago    Sexual activity: Yes     Partners: Male     Birth control/protection: None     Family History   Problem Relation Age of Onset   [facility] Cancer Mother         skin,thyroid,ovarian   [facility] Migraines Mother     Depression Mother     Anxiety Mother     Bipolar Disorder Mother     Other Father         prediabetes    Heart Disease Father     Hypertension Father     No Known Problems Sister     Other Brother         esbergers    Diabetes Paternal Uncle      OB History    Para Term  AB Living   2 1 1     1   SAB TAB Ectopic Molar Multiple Live Births           0 1      # Outcome Date GA Lbr Fabio/2nd Weight Sex Delivery Anes PTL Lv   2 Current            1 Term 02/15/19 40w6d 17:00 / 02:57 7 lb 8.5 oz (3.415 kg) F Vag-Spont EPIDURAL AN N KAREN     No Known Allergies  Prior to Admission medications    Medication Sig Start Date End Date Taking? Authorizing Provider   PNV [id number].06/[id number] fum/folic ac (PRENATAL PO) Take  by mouth. Provider, Historical   metFORMIN ER (GLUCOPHAGE XR) 500 mg tablet Take 1 tab every night for 2 weeks then increase to 1 tab BID for 2 weeks then 1 tab in the am followed by 2 tabs in pm for 2 weeks then increase to 2 tabs BID 21   Geronimo Felix CNM   magnesium 250 mg tab Take  by mouth.     Provider, Historical   omega-3 acid ethyl esters (LOVAZA) 1 gram capsule Take 2 g by mouth two (2) times a day.  Patient not taking: Reported on 6/22/2021    Provider, Historical   niacin-inositol 400 mg niacin (500 mg) cap Take  by mouth.   Patient not taking: Reported on 6/22/2021    Provider, Historical        Review of Systems: History obtained from the patient  Constitutional: negative for weight loss, fever, night sweats  HEENT: negative for hearing loss, earache, congestion, snoring, sore throat  CV: negative for chest pain, palpitations, edema  Resp: negative for cough, shortness of breath, wheezing  Breast: negative for breast lumps, nipple discharge, galactorrhea  GI: negative for change in bowel habits, abdominal pain, black or bloody stools  : negative for frequency, dysuria, hematuria, vaginal discharge  MSK: negative for back pain, joint pain, muscle pain  Skin: negative for itching, rash, hives  Neuro: negative for dizziness, headache, confusion, weakness  Psych: negative for anxiety, depression, change in mood  Heme/lymph: negative for bleeding, bruising, pallor    Objective:  Visit Vitals  LMP  (LMP Unknown)       Physical Exam:     Constitutional  · Appearance: well-nourished, well developed, alert, in no acute distress    HENT  · Head  · Face: appears normal  · Eyes: appear normal  · Ears: normal  · Mouth: normal  · Lips: no lesions      Chest  · Respiratory Effort: breathing unlabored     Cardiovascular  · Heart:  · Auscultation: regular rate and rhythm without murmur      Gastrointestinal  · Abdominal Examination: abdomen non-tender to palpation, normal bowel sounds, no masses present  · Liver and spleen: no hepatomegaly present, spleen not palpable  · Hernias: no hernias identified    Genitourinary  · deferred    Skin  · General Inspection: no rash, no lesions identified    Neurologic/Psychiatric  · Mental Status:  · Orientation: grossly oriented to person, place and time  · Mood and Affect: mood normal, affect appropriate    Assessment:   Intrauterine pregnancy with issues addressed in problem list  SIUP + CA YAIR 2/24/21    Plan:   Patient declines presence of chaperone during today's visit. Offered CF testing, CVS, Nuchal Translucency, MSAFP, amnio, and discussed NIPT  Course of pregnancy discussed including visit schedule, routine U/S, glucola testing, etc.  Avoid alcoholic beverages and illicit/recreational drugs use  Take prenatal vitamins or folic acid daily. Hospital and practice style discussed with coverage system. Discussed nutrition, toxoplasmosis precautions, sexual activity, exercise, need for influenza vaccine, environmental and work hazards, travel advice, screen for domestic violence, need for seat belts. Discussed seafood, unpasteurized dairy products, deli meat, artificial sweeteners, and caffeine. Discussed current prescription drug use. Given medication list.  Discussed the use of over the counter medications and chemicals. Route of delivery discussed, including risks, benefits     Handouts given to pt.

## 2021-07-14 LAB
T PALLIDUM AB SER QL IA: NON REACTIVE
VZV IGG SER IA-ACNC: 308 INDEX

## 2021-07-15 LAB
DEPRECATED HGB OTHER BLD-IMP: NORMAL %
HGB A MFR BLD: 97.6 % (ref 96.4–98.8)
HGB A2 MFR BLD COLUMN CHROM: 2.4 % (ref 1.8–3.2)
HGB C MFR BLD: NORMAL %
HGB F MFR BLD: 0 % (ref 0–2)
HGB FRACT BLD-IMP: NORMAL
HGB S BLD QL SOLY: NORMAL
HGB S MFR BLD: 0 %

## 2021-07-21 LAB
ADDITIONAL INFORMATION:, 511244: NORMAL
MTHFR GENE MUT ANL BLD/T: NORMAL
REFERENCES:, 511272: NORMAL

## 2021-07-26 NOTE — PROGRESS NOTES
Saji Dawson,     You do have a MTHFR mutation but one that should not affect many things. We can review in depth when you are here for your next apt. You can read the results on my chart and it reviews the ins and outs of your mutation - in the mean time make sure your prenatal vitamin has folate opposed to folic acid. One issue with MTHFR mutation is your body can not break folic acid down to folate. If you take folate your body does not have to worry about that :)    We can discuss if any additional lab work is needed :)  This mutation will not affect your pregnancy, other types of mutations can, but you are good friend.      Edward

## 2021-08-10 ENCOUNTER — ROUTINE PRENATAL (OUTPATIENT)
Dept: OBGYN CLINIC | Age: 26
End: 2021-08-10
Payer: COMMERCIAL

## 2021-08-10 VITALS — SYSTOLIC BLOOD PRESSURE: 114 MMHG | DIASTOLIC BLOOD PRESSURE: 74 MMHG | WEIGHT: 201 LBS | BODY MASS INDEX: 39.26 KG/M2

## 2021-08-10 DIAGNOSIS — Z3A.11 11 WEEKS GESTATION OF PREGNANCY: ICD-10-CM

## 2021-08-10 DIAGNOSIS — Z34.81 PRENATAL CARE, SUBSEQUENT PREGNANCY IN FIRST TRIMESTER: ICD-10-CM

## 2021-08-10 DIAGNOSIS — Z36.9 UNSPECIFIED ANTENATAL SCREENING: Primary | ICD-10-CM

## 2021-08-10 PROCEDURE — 0502F SUBSEQUENT PRENATAL CARE: CPT | Performed by: ADVANCED PRACTICE MIDWIFE

## 2021-08-10 NOTE — PATIENT INSTRUCTIONS
Weeks 10 to 14 of Your Pregnancy: Care Instructions  Overview     By weeks 10 to 14 of your pregnancy, the placenta has formed inside your uterus. The placenta's main job is to give your baby oxygen and nutrients through the umbilical cord. It's possible to hear your baby's heartbeat with a special ultrasound device. Your baby's organs are developing. The arms and legs can bend. This is a good time to think about testing for birth defects. There are two types of tests: screening and diagnostic. Screening tests show the chance that a baby has a certain birth defect. They can't tell you for sure that your baby has a problem. Diagnostic tests show if a baby has a certain birth defect. It's your choice whether to have these tests. You and your partner can talk to your doctor or midwife about tests for birth defects. Follow-up care is a key part of your treatment and safety. Be sure to make and go to all appointments, and call your doctor if you are having problems. It's also a good idea to know your test results and keep a list of the medicines you take. How can you care for yourself at home? Decide about tests  · You can have screening tests and diagnostic tests to check for birth defects. The decision to have a test for birth defects is personal. Think about your age, your chance of passing on a family disease, your need to know about any problems, and what you might do after you have the test results. ? Quadruple (quad) blood test. This screening test can be done between 15 and 22 weeks of pregnancy. It checks the amount of four substances in your blood. The doctor looks at these test results, along with your age and other factors, to find out the chance that your baby may have certain problems. ? Amniocentesis. This diagnostic test is used to look for chromosomal problems in the baby's cells.  It can be done between 15 and 20 weeks of pregnancy, usually around week 16.  ? Nuchal translucency test. This test uses ultrasound to measure the thickness of the area at the back of the baby's neck. An increase in the thickness can be an early sign of Down syndrome. ? Chorionic villus sampling (CVS). This is a test that looks for certain genetic problems with your baby. The same genes that are in your baby are in the placenta. A small piece of the placenta is taken out and tested. This test is done when you are 10 to 13 weeks pregnant. Ease discomfort  · Slow down and take naps when you feel tired. · If your emotions swing, talk to someone. · If your gums bleed, try a softer toothbrush. If your gums are puffy and bleed a lot, see your dentist.  · If you feel dizzy:  ? Get up slowly after sitting or lying down. ? Drink plenty of fluids. ? Eat small snacks to keep your blood sugar stable. ? Put your head between your legs as though you were tying your shoelaces. ? Lie down with your legs higher than your head. Use pillows to prop up your feet. · If you have a headache:  ? Lie down. ? Ask your partner or a good friend for a neck massage. ? Try cool cloths over your forehead or across the back of your neck. ? Use acetaminophen (Tylenol) for pain relief. Do not use nonsteroidal anti-inflammatory drugs (NSAIDs), such as ibuprofen (Advil, Motrin) or naproxen (Aleve), unless your doctor says it is okay. · If you have a nosebleed, pinch your nose gently, and hold it for a short while. To prevent nosebleeds, try massaging a small dab of petroleum jelly, such as Vaseline, in your nostrils. · If your nose is stuffed up, try saline (saltwater) nose sprays. Do not use decongestant sprays. Care for your breasts  · Wear a bra that gives you good support. · Know that changes in your breasts are normal.  ? Your breasts may get larger and more tender. Tenderness usually gets better by 12 weeks. ? Your nipples may get darker and larger, and small bumps around your nipples may show more. ?  The veins in your chest and breasts may show more. Where can you learn more? Go to http://www.gray.com/  Enter Y218 in the search box to learn more about \"Weeks 10 to 14 of Your Pregnancy: Care Instructions. \"  Current as of: October 8, 2020               Content Version: 12.8  © 4292-3400 [employer]. Care instructions adapted under license by [employer] (which disclaims liability or warranty for this information). If you have questions about a medical condition or this instruction, always ask your healthcare professional. [address] any warranty or liability for your use of this information.

## 2021-08-10 NOTE — PROGRESS NOTES
Doing well  Feels good overall   Seeing a nutritionist to hep with gut issues   V scan visualization of FHR   TONJA 4 weeks

## 2021-08-13 LAB
BACTERIA UR CULT: NORMAL
C TRACH RRNA SPEC QL NAA+PROBE: NEGATIVE
N GONORRHOEA RRNA SPEC QL NAA+PROBE: NEGATIVE
SPECIMEN STATUS REPORT, ROLRST: NORMAL
SPECIMEN STATUS REPORT, ROLRST: NORMAL
T VAGINALIS DNA SPEC QL NAA+PROBE: NEGATIVE

## 2021-08-16 ENCOUNTER — TELEPHONE (OUTPATIENT)
Dept: OBGYN CLINIC | Age: 26
End: 2021-08-16

## 2021-08-16 DIAGNOSIS — Z3A.11 11 WEEKS GESTATION OF PREGNANCY: ICD-10-CM

## 2021-08-16 NOTE — TELEPHONE ENCOUNTER
Called and spoke with patient. Informed her of LOW RISK panorama results. Patient asked that gender be kept a secret. Patient is going to  the results in an envelope from the . Patient has no further questions at this time. PL updated.

## 2021-10-08 ENCOUNTER — TELEPHONE (OUTPATIENT)
Dept: OBGYN CLINIC | Age: 26
End: 2021-10-08

## 2021-10-08 NOTE — TELEPHONE ENCOUNTER
Pt calling regarding her appt on 10/11. PT states she has been trying to find a  for her 2 yo daughter for the appt and cannot find one. Her  is coming to appt with her, but wanted to know if there is any way that we could accomodate them just for the [place] only?  will stay with daughter for visit, but really wanted to be in for the fetal scan. Pt states her daughter won't be running around or doing anything. This RN advised pt to speak with Belle on Monday. Pt verbalized understanding.

## 2021-10-11 ENCOUNTER — ROUTINE PRENATAL (OUTPATIENT)
Dept: OBGYN CLINIC | Age: 26
End: 2021-10-11

## 2021-10-11 VITALS
BODY MASS INDEX: 40.84 KG/M2 | SYSTOLIC BLOOD PRESSURE: 122 MMHG | WEIGHT: 208 LBS | DIASTOLIC BLOOD PRESSURE: 74 MMHG | HEIGHT: 60 IN

## 2021-10-11 DIAGNOSIS — Z3A.01 LESS THAN 8 WEEKS GESTATION OF PREGNANCY: ICD-10-CM

## 2021-10-11 PROCEDURE — 0502F SUBSEQUENT PRENATAL CARE: CPT | Performed by: ADVANCED PRACTICE MIDWIFE

## 2021-10-11 NOTE — PROGRESS NOTES
Doing well. GFM. Reviewed scan today - pt and  excited     US done today:    FETAL SURVEY  A SINGLE VIABLE IUP OF 20W4D IS SEEN WITH FETAL CARDIAC MOTION OBSERVED. FETAL ANATOMY WAS WELL VISUALIZED AND APPEARS WNL. NO ABNORMALITIES WERE SEEN ON TODAYS EXAM.  APPROPRIATE GROWTH MEASURED. SIZE=DATES. TENNILLE, PLACENTA, AND CERVIX APPEARS WNL.   GENDER: FEMALE

## 2021-11-08 ENCOUNTER — ROUTINE PRENATAL (OUTPATIENT)
Dept: OBGYN CLINIC | Age: 26
End: 2021-11-08
Payer: COMMERCIAL

## 2021-11-08 VITALS
WEIGHT: 212 LBS | HEIGHT: 60 IN | SYSTOLIC BLOOD PRESSURE: 128 MMHG | BODY MASS INDEX: 41.62 KG/M2 | DIASTOLIC BLOOD PRESSURE: 72 MMHG

## 2021-11-08 DIAGNOSIS — Z3A.01 LESS THAN 8 WEEKS GESTATION OF PREGNANCY: ICD-10-CM

## 2021-11-08 PROBLEM — Z34.90 PREGNANCY: Status: RESOLVED | Noted: 2018-10-26 | Resolved: 2021-11-08

## 2021-11-08 PROCEDURE — 0502F SUBSEQUENT PRENATAL CARE: CPT | Performed by: ADVANCED PRACTICE MIDWIFE

## 2021-11-08 RX ORDER — LANCETS
EACH MISCELLANEOUS
Qty: 1 EACH | Refills: 11 | Status: SHIPPED | OUTPATIENT
Start: 2021-11-08 | End: 2021-12-25

## 2021-11-08 RX ORDER — INSULIN PUMP SYRINGE, 3 ML
EACH MISCELLANEOUS
Qty: 1 KIT | Refills: 0 | Status: SHIPPED | OUTPATIENT
Start: 2021-11-08 | End: 2021-11-29 | Stop reason: SDUPTHER

## 2021-11-08 NOTE — LETTER
11/8/2021 10:45 AM    Patient:  Sil Aj   YOB: 1995  Date of Visit: 11/8/2021      To whom it may concern,    Sil Aj is an established pt in my office. She is pregnant and unvaccinated with an estimated due date of 2/24/2022. We recommend decreasing contact with high risk environments, such as crowded shops, parties, gyms, etc, until 6 weeks post partum. If you have questions, please do not hesitate to call me. I look forward to following Ms. Bambi Garcia along with you.         Sincerely,      Eugene Marquez CNM

## 2021-11-08 NOTE — PROGRESS NOTES
Doing well. GFM. Declines GTT for next visit    Pt asking for a note to get out of her gym membership due to gym day care issues. CNM declined to sign as that is not a medical issue-   CNM did provide note for pt to supply to gym to hold membership while pregnant due to covid exposure risks and pt not wanting to get vaccinated. Pt will do blood sugars Fasting - states she is unlikely to draw the ac blood sugars. Draw HA1C and third trimester labs with next visit.    TONJA 4 weeks

## 2021-11-09 ENCOUNTER — TELEPHONE (OUTPATIENT)
Dept: OBGYN CLINIC | Age: 26
End: 2021-11-09

## 2021-11-09 RX ORDER — IBUPROFEN 200 MG
CAPSULE ORAL
Qty: 100 STRIP | Refills: 5 | Status: SHIPPED | OUTPATIENT
Start: 2021-11-09 | End: 2021-12-25

## 2021-11-09 NOTE — TELEPHONE ENCOUNTER
Pt calling to get an order for her BG test strips. This RN ordered test strips to pt preferred pharmacy. Pt verbalized understanding.

## 2021-11-29 RX ORDER — INSULIN PUMP SYRINGE, 3 ML
EACH MISCELLANEOUS
Qty: 1 KIT | Refills: 0 | Status: SHIPPED | OUTPATIENT
Start: 2021-11-29 | End: 2021-12-25

## 2021-12-06 ENCOUNTER — ROUTINE PRENATAL (OUTPATIENT)
Dept: OBGYN CLINIC | Age: 26
End: 2021-12-06
Payer: COMMERCIAL

## 2021-12-06 VITALS
HEIGHT: 60 IN | DIASTOLIC BLOOD PRESSURE: 68 MMHG | WEIGHT: 218 LBS | BODY MASS INDEX: 42.8 KG/M2 | SYSTOLIC BLOOD PRESSURE: 104 MMHG

## 2021-12-06 DIAGNOSIS — Z3A.28 28 WEEKS GESTATION OF PREGNANCY: ICD-10-CM

## 2021-12-06 DIAGNOSIS — Z34.90 PREGNANCY, UNSPECIFIED GESTATIONAL AGE: Primary | ICD-10-CM

## 2021-12-06 LAB
BLOOD BANK CMNT PATIENT-IMP: NORMAL
BLOOD GROUP ANTIBODIES SERPL: NORMAL
ERYTHROCYTE [DISTWIDTH] IN BLOOD BY AUTOMATED COUNT: 12.7 % (ref 11.5–14.5)
EST. AVERAGE GLUCOSE BLD GHB EST-MCNC: 91 MG/DL
HBA1C MFR BLD: 4.8 % (ref 4–5.6)
HCT VFR BLD AUTO: 34.5 % (ref 35–47)
HGB BLD-MCNC: 11.1 G/DL (ref 11.5–16)
MCH RBC QN AUTO: 29 PG (ref 26–34)
MCHC RBC AUTO-ENTMCNC: 32.2 G/DL (ref 30–36.5)
MCV RBC AUTO: 90.1 FL (ref 80–99)
NRBC # BLD: 0 K/UL (ref 0–0.01)
NRBC BLD-RTO: 0 PER 100 WBC
PLATELET # BLD AUTO: 256 K/UL (ref 150–400)
PMV BLD AUTO: 11 FL (ref 8.9–12.9)
RBC # BLD AUTO: 3.83 M/UL (ref 3.8–5.2)
WBC # BLD AUTO: 10.7 K/UL (ref 3.6–11)

## 2021-12-06 PROCEDURE — 0502F SUBSEQUENT PRENATAL CARE: CPT | Performed by: ADVANCED PRACTICE MIDWIFE

## 2021-12-06 NOTE — PATIENT INSTRUCTIONS
Weeks 26 to 30 of Your Pregnancy: Care Instructions  Overview     You are now entering your last trimester of pregnancy. Your baby is growing quickly. Makenzie Galarza probably feel your baby moving around more often. Your doctor may ask you to count your baby's kicks. Your back may ache as your body gets used to your baby's size and length. If you haven't already had the Tdap shot during this pregnancy, talk to your doctor about getting it. It will help protect your  against pertussis infection. During this time, it's important to take care of yourself and pay attention to what your body needs. If you feel sexual, you can explore ways to be close with your partner that match your comfort and desire. Follow-up care is a key part of your treatment and safety. Be sure to make and go to all appointments, and call your doctor if you are having problems. It's also a good idea to know your test results and keep a list of the medicines you take. How can you care for yourself at home? Take it easy at work  · Take frequent breaks. If possible, stop working when you are tired, and rest during your lunch hour. · Take bathroom breaks every 2 hours. · Change positions often. If you sit for long periods, stand up and walk around. · When you stand for a long time, keep one foot on a low stool with your knee bent. After standing a lot, sit with your feet up. · Avoid fumes, chemicals, and tobacco smoke. Be sexual in your own way  · Having sex during pregnancy is okay, unless your doctor tells you not to. · You may be very interested in sex, or you may have no interest at all. · Your growing belly can make it hard to find a good position during intercourse. [place] and explore. · You may get cramps in your uterus when your partner touches your breasts. · A back rub may relieve the backache or cramps that sometimes follow orgasm. Learn about  labor  · Watch for signs of  labor.  You may be going into labor if:  ? You have menstrual-like cramps, with or without nausea. ? You have about 6 or more contractions in 1 hour, even after you have had a glass of water and are resting. ? You have a low, dull backache that does not go away when you change your position. ? You have pain or pressure in your pelvis that comes and goes in a pattern. ? You have intestinal cramping or flu-like symptoms, with or without diarrhea.  ? You notice an increase or change in your vaginal discharge. Discharge may be heavy, mucus-like, watery, or streaked with blood. ? Your water breaks. · If you think you have  labor:  ? Drink 2 or 3 glasses of water or juice. Not drinking enough fluids can cause contractions. ? Stop what you are doing, and empty your bladder. Then lie down on your left side for at least 1 hour. ? While lying on your side, find your breast bone. Put your fingers in the soft spot just below it. Move your fingers down toward your belly button to find the top of your uterus. Check to see if it is tight. ? Contractions can be weak or strong. Record your contractions for an hour. Time a contraction from the start of one contraction to the start of the next one.  ? Single or several strong contractions without a pattern are called [place]-Bhatia contractions. They are practice contractions but not the start of labor. They often stop if you change what you are doing. ? Call your doctor if you have regular contractions. Where can you learn more? Go to http://www.gray.com/  Enter X589 in the search box to learn more about \"Weeks 26 to 30 of Your Pregnancy: Care Instructions. \"  Current as of: 2021               Content Version: 13.0  © 0829-6504 [employer]. Care instructions adapted under license by [employer] (which disclaims liability or warranty for this information).  If you have questions about a medical condition or this instruction, always ask your healthcare professional. [place] [address] any warranty or liability for your use of this information.

## 2021-12-06 NOTE — PROGRESS NOTES
Doing well.  Reports GFM  Lost glucometer, picking up another @ [facility] today  Enc to bring in 5 or 6 FBS values  Checked a few 1 hrs modified diet values between  based on meal  Hgb A1C today

## 2021-12-07 LAB — T PALLIDUM AB SER QL IA: NON REACTIVE

## 2021-12-16 ENCOUNTER — TELEPHONE (OUTPATIENT)
Dept: OBGYN CLINIC | Age: 26
End: 2021-12-16

## 2021-12-16 NOTE — TELEPHONE ENCOUNTER
31year old pregnant pt 30w0d calling in regards to having tons of pressure and discomfort. Pt states she has +FM and no vaginal bleeding. Advised pt to take tylenol and to take a warm bath to see if that sooths the pain and to give the office a call if symptoms worsen or if she has decreased FM. Pt gave verbal understanding.

## 2021-12-25 ENCOUNTER — TELEPHONE (OUTPATIENT)
Dept: OBGYN CLINIC | Age: 26
End: 2021-12-25

## 2021-12-25 ENCOUNTER — HOSPITAL ENCOUNTER (EMERGENCY)
Age: 26
Discharge: HOME | End: 2021-12-25
Attending: STUDENT IN AN ORGANIZED HEALTH CARE EDUCATION/TRAINING PROGRAM
Payer: COMMERCIAL

## 2021-12-25 VITALS
OXYGEN SATURATION: 98 % | WEIGHT: 218 LBS | RESPIRATION RATE: 18 BRPM | HEIGHT: 61 IN | DIASTOLIC BLOOD PRESSURE: 92 MMHG | HEART RATE: 108 BPM | SYSTOLIC BLOOD PRESSURE: 132 MMHG | BODY MASS INDEX: 41.16 KG/M2 | TEMPERATURE: 98.1 F

## 2021-12-25 DIAGNOSIS — Z20.822 PERSON UNDER INVESTIGATION FOR COVID-19: Primary | ICD-10-CM

## 2021-12-25 DIAGNOSIS — Z3A.32 32 WEEKS GESTATION OF PREGNANCY: ICD-10-CM

## 2021-12-25 PROCEDURE — 99284 EMERGENCY DEPT VISIT MOD MDM: CPT

## 2021-12-25 PROCEDURE — U0005 INFEC AGEN DETEC AMPLI PROBE: HCPCS

## 2021-12-25 PROCEDURE — 74011250636 HC RX REV CODE- 250/636: Performed by: NURSE PRACTITIONER

## 2021-12-25 RX ORDER — SODIUM CHLORIDE 9 MG/ML
1000 INJECTION, SOLUTION INTRAVENOUS ONCE
Status: COMPLETED | OUTPATIENT
Start: 2021-12-25 | End: 2021-12-25

## 2021-12-25 RX ADMIN — SODIUM CHLORIDE 1000 ML: 9 INJECTION, SOLUTION INTRAVENOUS at 09:33

## 2021-12-25 NOTE — ED PROVIDER NOTES
EMERGENCY DEPARTMENT HISTORY AND PHYSICAL EXAM    Date: 12/25/2021  Patient Name: Nicanor Amanda    History of Presenting Illness     Chief Complaint   Patient presents with    Concern For PFYPS-87 (Coronavirus)         History Provided By: Patient    Chief Complaint: shortness of breath  Duration: onset this am  Timing:  Acute  Location: chest area  Quality: felt lilke she can't catch her breath  Severity: Mild  Modifying Factors: none  Associated Symptoms: denies any other associated signs or symptoms      HPI: Nicanor Amanda is a 31 y.o. female with a PMH of No significant past medical history who presents with shortness of breath acute onset this morning. Patient states her  tested positive for Covid and she thinks she may have Covid. Patient denies cough or wheezing. She states she just had experience of chest tightness and shortness of breath. Patient states she does not want to take medication and does not want Tylenol but would like something done to help her feel better. She denies cough wheezing fever abdominal pain nausea vomiting vaginal bleeding.     PCP: Filiberto Lindsey NP        Past History     Past Medical History:  Past Medical History:   Diagnosis Date    Anxiety     Borderline personality disorder ([facility] [address]     Depression     History of removal of left breast implant 09/2020    History of removal of right breast implant 09/2020    PCOS (polycystic ovarian syndrome)     Polycystic disease, ovaries     Unable to get pregnant, female 12/10/2020       Past Surgical History:  Past Surgical History:   Procedure Laterality Date    HX BREAST AUGMENTATION  07/2017    MS BREAST SURGERY PROCEDURE UNLISTED  2017    Augmentation    MS REMOVAL INTACT BREAST IMPLANT  09/2020    bilateral breast implant removal       Family History:  Family History   Problem Relation Age of Onset    Cancer Mother         skin,thyroid,ovarian   [employer] Mother     Depression Mother     Anxiety Mother  Bipolar Disorder Mother     Other Father         prediabetes    Heart Disease Father     Hypertension Father     No Known Problems Sister     Other Brother         esbergers    Diabetes Paternal Uncle        Social History:  Social History     Tobacco Use    Smoking status: Never Smoker    Smokeless tobacco: Never Used   Substance Use Topics    Alcohol use: Not Currently     Alcohol/week: 0.0 standard drinks     Comment: 1 x/year    Drug use: Not Currently     Types: Marijuana     Comment: couple of months ago       Allergies:  No Known Allergies      Review of Systems   Review of Systems   Constitutional: Negative for fatigue and fever. Respiratory: Positive for shortness of breath. Negative for wheezing. Cardiovascular: Negative for chest pain. Gastrointestinal: Negative for abdominal pain. Musculoskeletal: Negative for arthralgias, myalgias, neck pain and neck stiffness. Skin: Negative for pallor and rash. Neurological: Negative for dizziness, tremors and headaches. All other systems reviewed and are negative. Physical Exam     Vitals:    12/25/21 0900 12/25/21 0929 12/25/21 0952 12/25/21 1118   BP: (!) 131/92 (!) 126/92  (!) 132/92   Pulse: (!) 125  (!) 112 (!) 108   Resp: 18      Temp: 98.1 °F (36.7 °C)      SpO2: 98%  96% 98%   Weight: 98.9 kg (218 lb)      Height: 5' 1\" (1.549 m)        Physical Exam  Vitals and nursing note reviewed. Constitutional:       General: She is not in acute distress. Appearance: She is well-developed. HENT:      Head: Normocephalic and atraumatic. Right Ear: External ear normal.      Left Ear: External ear normal.      Nose: Nose normal.   Eyes:      Conjunctiva/sclera: Conjunctivae normal.   Cardiovascular:      Rate and Rhythm: Normal rate and regular rhythm. Heart sounds: Normal heart sounds. Pulmonary:      Effort: Pulmonary effort is normal. No respiratory distress. Breath sounds: Normal breath sounds. No wheezing. Abdominal:      General: Bowel sounds are normal.      Palpations: Abdomen is soft. Tenderness: There is no abdominal tenderness. Comments: Gravid abdomen   Musculoskeletal:         General: Normal range of motion. Cervical back: Normal range of motion and neck supple. Lymphadenopathy:      Cervical: No cervical adenopathy. Skin:     General: Skin is warm and dry. Findings: No rash. Neurological:      Mental Status: She is alert and oriented to person, place, and time. Cranial Nerves: No cranial nerve deficit. Coordination: Coordination normal.   Psychiatric:         Behavior: Behavior normal.         Thought Content: Thought content normal.         Judgment: Judgment normal.           Diagnostic Study Results     Labs -   No results found for this or any previous visit (from the past 12 hour(s)). Radiologic Studies -   No orders to display     CT Results  (Last 48 hours)    None        CXR Results  (Last 48 hours)    None            Medical Decision Making   I am the first provider for this patient. I reviewed the vital signs, available nursing notes, past medical history, past surgical history, family history and social history. Vital Signs-Reviewed the patient's vital signs. Records Reviewed: Nursing Notes    Provider Notes (Medical Decision Making):   DDX active airway disease viral syndrome COVID-19          Disposition:  Home  Patient states she feels much better and is not short of breath. States \"IV fluids helped\". States she is ready to go home. DISCHARGE NOTE:     I have discussed with patient their diagnosis, treatment, and follow up plan. The patient agrees to follow up as outlined in discharge paperwork and also to return to the ED with any worsening.  Renate Angeles NP        Follow-up Information     Follow up With Specialties Details Why Contact Info    follow up with your obstetrician next week              There are no discharge medications for this patient. Procedures:  Procedures    Please note that this dictation was completed with Dragon, computer voice recognition software. Quite often unanticipated grammatical, syntax, homophones, and other interpretive errors are inadvertently transcribed by the computer software. Please disregard these errors. Additionally, please excuse any errors that have escaped final proofreading. Diagnosis     Clinical Impression:   1.  Person under investigation for COVID-19    2. 32 weeks gestation of pregnancy

## 2021-12-25 NOTE — ED NOTES
Discharge instructions were given to the patient by Kary Partida RN. The patient left the Emergency Department ambulatory, alert and oriented and in no acute distress with 0 prescriptions. The patient was encouraged to call or return to the ED for worsening issues or problems and was encouraged to schedule a follow up appointment for continuing care. The patient verbalized understanding of discharge instructions and prescriptions, all questions were answered. The patient has no further concerns at this time.

## 2021-12-25 NOTE — ED TRIAGE NOTES
Pt reports being 8 months pregnant and her  being Covid +, pt states she woke up this morning feeling short of breath and wasn't sure if it was pregnancy related or if she has covid.

## 2021-12-25 NOTE — ED NOTES
Pt requesting Ibuprofen prescription for home. Explained to Pt that Ibuprofen isn't safe for pregnancy. Pt stating that Tylenol is \"even less safe, and there are studies that show it leads to Autism. \" Advised Pt I would let NP know she was requesting this. NP aware, and will go educate Pt further.

## 2021-12-25 NOTE — TELEPHONE ENCOUNTER
Sandra Asp called reporting shortness of breath, headache, chills.  is covid+, X 4 days. Instructed to come to ER now with difficulty breathing.

## 2021-12-26 ENCOUNTER — TELEPHONE (OUTPATIENT)
Dept: OBGYN CLINIC | Age: 26
End: 2021-12-26

## 2021-12-26 NOTE — TELEPHONE ENCOUNTER
Called to report symptoms of Covid. Chills, coughing, and fever. 31 weeks . Tylenol, vitamin C, zinc supplement. Present to urgent care/ER with worsening symptoms, shortness of breath, dizzy.

## 2021-12-27 LAB
SARS-COV-2, XPLCVT: DETECTED
SOURCE, COVRS: ABNORMAL

## 2021-12-29 ENCOUNTER — ROUTINE PRENATAL (OUTPATIENT)
Dept: OBGYN CLINIC | Age: 26
End: 2021-12-29

## 2021-12-29 ENCOUNTER — VIRTUAL VISIT (OUTPATIENT)
Dept: OBGYN CLINIC | Age: 26
End: 2021-12-29
Payer: COMMERCIAL

## 2021-12-29 DIAGNOSIS — Z3A.01 LESS THAN 8 WEEKS GESTATION OF PREGNANCY: ICD-10-CM

## 2021-12-29 DIAGNOSIS — Z34.83 PRENATAL CARE, SUBSEQUENT PREGNANCY IN THIRD TRIMESTER: Primary | ICD-10-CM

## 2021-12-29 PROCEDURE — 0502F SUBSEQUENT PRENATAL CARE: CPT | Performed by: ADVANCED PRACTICE MIDWIFE

## 2021-12-29 NOTE — PROGRESS NOTES
Jimmie Day, who was evaluated through a synchronous (real-time) audio only encounter, and/or her healthcare decision maker, is aware that it is a billable service, with coverage as determined by her insurance carrier. She provided verbal consent to proceed: Yes, and patient identification was verified. This visit was conducted pursuant to the emergency declaration under the [address] [place] [address] authority and the [employer] and [employer] Act. A caregiver was present when appropriate. Ability to conduct physical exam was limited. The patient was located in a state where the provider was credentialed to provide care.      --Jonathon Carrillo CNM on 12/29/2021 at 11:13 AM

## 2021-12-29 NOTE — PROGRESS NOTES
Pt tested positive for covid on 12/24- 10 days post symptoms will be 1/3- per office policy pt may be seen in office after that time. Pt is very upset that she can not be seen in the office, reviewed office policy with pt. Offered ALBERTO if she has any concerns that can not be attended to over the phone or with virtual visits. Normal complaints of pregnancy aches and pains, increase with timi hale- pt was hydrated with IVF in the er- encouarged pt to increase water intake with being ill.      Pt to be seen 1/5 - may bring her child as she does not have  that week -     Growth scan 34 weeks

## 2022-01-06 ENCOUNTER — ROUTINE PRENATAL (OUTPATIENT)
Dept: OBGYN CLINIC | Age: 27
End: 2022-01-06
Payer: COMMERCIAL

## 2022-01-06 VITALS
HEIGHT: 61 IN | DIASTOLIC BLOOD PRESSURE: 68 MMHG | WEIGHT: 222 LBS | SYSTOLIC BLOOD PRESSURE: 120 MMHG | BODY MASS INDEX: 41.91 KG/M2

## 2022-01-06 DIAGNOSIS — Z3A.33 33 WEEKS GESTATION OF PREGNANCY: ICD-10-CM

## 2022-01-06 PROCEDURE — 0502F SUBSEQUENT PRENATAL CARE: CPT | Performed by: ADVANCED PRACTICE MIDWIFE

## 2022-01-06 NOTE — PROGRESS NOTES
Doing well.    Had mild Covid infection, feels 100% recovered  GFM  Denies PTL s/sx  Plan growth next wk, will probably need to bring daughter with limited childcare options  Start Baby ASA

## 2022-01-13 ENCOUNTER — ROUTINE PRENATAL (OUTPATIENT)
Dept: OBGYN CLINIC | Age: 27
End: 2022-01-13
Payer: COMMERCIAL

## 2022-01-13 VITALS
SYSTOLIC BLOOD PRESSURE: 120 MMHG | DIASTOLIC BLOOD PRESSURE: 72 MMHG | HEIGHT: 61 IN | WEIGHT: 222 LBS | BODY MASS INDEX: 41.91 KG/M2

## 2022-01-13 DIAGNOSIS — Z34.83 PRENATAL CARE, SUBSEQUENT PREGNANCY IN THIRD TRIMESTER: Primary | ICD-10-CM

## 2022-01-13 DIAGNOSIS — Z3A.34 34 WEEKS GESTATION OF PREGNANCY: ICD-10-CM

## 2022-01-13 PROCEDURE — 0502F SUBSEQUENT PRENATAL CARE: CPT | Performed by: ADVANCED PRACTICE MIDWIFE

## 2022-01-13 NOTE — PROGRESS NOTES
Doing well. GFM  Plan Repeat Scan and GBS next visit    LIMITED OB SCAN  A SINGLE VERTEX 34W0D IUP IS SEEN. FETAL CARDIAC MOTION OBSERVED. LIMITED ANATOMY WAS VISUALIZED AND APPEARS WNL. EFW- 5 LBS 11 OZ ( 62.7 %)  TENNILLE= 24.09 CM  PLACENTA APPEAR WNL.

## 2022-01-27 ENCOUNTER — ROUTINE PRENATAL (OUTPATIENT)
Dept: OBGYN CLINIC | Age: 27
End: 2022-01-27

## 2022-01-27 VITALS
WEIGHT: 227 LBS | HEIGHT: 61 IN | DIASTOLIC BLOOD PRESSURE: 62 MMHG | SYSTOLIC BLOOD PRESSURE: 112 MMHG | BODY MASS INDEX: 42.86 KG/M2

## 2022-01-27 DIAGNOSIS — Z34.80 SUPERVISION OF OTHER NORMAL PREGNANCY: Primary | ICD-10-CM

## 2022-01-27 DIAGNOSIS — Z3A.01 LESS THAN 8 WEEKS GESTATION OF PREGNANCY: ICD-10-CM

## 2022-01-27 LAB — GRBS, EXTERNAL: NEGATIVE

## 2022-01-27 PROCEDURE — 0502F SUBSEQUENT PRENATAL CARE: CPT | Performed by: ADVANCED PRACTICE MIDWIFE

## 2022-01-27 RX ORDER — BLOOD-GLUCOSE METER
EACH MISCELLANEOUS
COMMUNITY
Start: 2021-12-06 | End: 2022-03-08

## 2022-01-27 RX ORDER — LANCETS 33 GAUGE
EACH MISCELLANEOUS
COMMUNITY
Start: 2021-11-08 | End: 2022-03-08

## 2022-01-27 NOTE — PATIENT INSTRUCTIONS
Weeks 34 to 36 of Your Pregnancy: Care Instructions  Overview     By now, your baby and your belly have grown quite large. It's almost time to give birth! Your baby's lungs are almost ready to breathe air. The skull bones are firm enough to protect your baby's head, but soft enough to move down through the birth canal.  You may be feeling excited and happy at times--but also anxious or scared. You might wonder how you'll know if you're in labor or what to expect during labor. Try to be open and flexible in your expectations of the birth. Because each birth is different, there's no way to know exactly what childbirth will be like for you. Talk to your doctor or midwife about any concerns you have. If you haven't already had the Tdap shot during this pregnancy, talk to your doctor about getting it. It will help protect your  against pertussis infection. In the 36th week, you'll probably have a test for group B streptococcus (GBS). GBS is a common type of bacteria that can live in the vagina and rectum. It can make your baby sick after birth. If you test positive, you will get antibiotics during labor. The medicine will help keep your baby from getting the bacteria. Follow-up care is a key part of your treatment and safety. Be sure to make and go to all appointments, and call your doctor if you are having problems. It's also a good idea to know your test results and keep a list of the medicines you take. How can you care for yourself at home? Learn about pain relief choices  · Pain is different for everyone. Talk with your doctor about your feelings about pain. · You can choose from several types of pain relief. These include medicine, breathing techniques, and comfort measures. You can use more than one option. · If you choose to have pain medicine during labor, talk to your doctor about your options. Some medicines lower anxiety and help with some of the pain.  Others make your lower body numb so that you won't feel pain. · Be sure to tell your doctor about your pain medicine choice before you start labor or very early in your labor. You may be able to change your mind as labor progresses. Labor and delivery  · The first stage of labor has three parts: early, active, and transition. ? It's common to have early labor at home. You can stay busy or rest, eat light snacks, drink clear fluids, and start counting contractions. ? When talking during a contraction gets hard, you may be moving to active labor. During active labor, you should head for the hospital if you aren't there already. ? You are in active labor when contractions come every 3 to 4 minutes and last about 60 seconds. Your cervix is opening more rapidly. ? If your water breaks, contractions will come faster and stronger. ? During transition, your cervix is stretching, and contractions are coming more rapidly. ? You may want to push, but your cervix might not be ready. Your doctor will tell you when to push. · The second stage starts when your cervix is completely opened and you are ready to push. ? Contractions are very strong to push the baby down the birth canal.  ? You will probably feel the urge to push. You may feel like you need to have a bowel movement. ? You may be coached to push with contractions. These contractions will be very strong, but you won't have them as often. You can get a little rest between contractions. ? One last push, and your baby is born. · The third stage is when a few more contractions push out the placenta. This may take 30 minutes or less. Where can you learn more? Go to http://www.gray.com/  Enter B912 in the search box to learn more about \"Weeks 34 to 36 of Your Pregnancy: Care Instructions. \"  Current as of: June 16, 2021               Content Version: 13.0  © 0141-2493 [employer].    Care instructions adapted under license by [employer] (which disclaims liability or warranty for this information). If you have questions about a medical condition or this instruction, always ask your healthcare professional. [address] any warranty or liability for your use of this information.

## 2022-01-27 NOTE — PROGRESS NOTES
LIMITED OB SCAN  A SINGLE VERTEX 36W0D IUP IS SEEN. FETAL CARDIAC MOTION OBSERVED. LIMITED ANATOMY WAS VISUALIZED AND APPEARS WNL. BPP=8/8  TENNILLE= 29 CM  PLACENTA APPEAR WITHIN NORMAL LIMITS.     Uncomfortable - pt is short with short torso- fluid level increased- reviewed with pt follow up BPP 29 cm- repeat in one week if continue to be elevated MFM eval  TONJA 1 week

## 2022-01-30 PROBLEM — Z34.90 ENCOUNTER FOR ELECTIVE INDUCTION OF LABOR: Status: RESOLVED | Noted: 2019-02-15 | Resolved: 2022-01-30

## 2022-01-30 PROBLEM — N97.9 UNABLE TO GET PREGNANT, FEMALE: Status: RESOLVED | Noted: 2020-12-10 | Resolved: 2022-01-30

## 2022-01-30 LAB
BACTERIA SPEC CULT: NORMAL
SERVICE CMNT-IMP: NORMAL

## 2022-02-01 ENCOUNTER — ROUTINE PRENATAL (OUTPATIENT)
Dept: OBGYN CLINIC | Age: 27
End: 2022-02-01

## 2022-02-01 VITALS
WEIGHT: 229 LBS | HEIGHT: 61 IN | DIASTOLIC BLOOD PRESSURE: 74 MMHG | BODY MASS INDEX: 43.23 KG/M2 | SYSTOLIC BLOOD PRESSURE: 118 MMHG

## 2022-02-01 DIAGNOSIS — Z34.80 SUPERVISION OF OTHER NORMAL PREGNANCY: Primary | ICD-10-CM

## 2022-02-01 PROCEDURE — 0502F SUBSEQUENT PRENATAL CARE: CPT | Performed by: ADVANCED PRACTICE MIDWIFE

## 2022-02-01 NOTE — PROGRESS NOTES
Doing well. Reports GFM  Increased fluid level continues reviewed risk factors with pt -   TONJA 1 week with BPP     LIMITED OB SCAN  A SINGLE VERTEX 36W5D IUP IS SEEN. FETAL CARDIAC MOTION OBSERVED. LIMITED ANATOMY WAS VISUALIZED AND APPEARS WNL. BPP=8/8  TENNILLE= 29.6 CM  PLACENTA APPEAR WITHIN NORMAL LIMITS.

## 2022-02-10 ENCOUNTER — ROUTINE PRENATAL (OUTPATIENT)
Dept: OBGYN CLINIC | Age: 27
End: 2022-02-10

## 2022-02-10 ENCOUNTER — TELEPHONE (OUTPATIENT)
Dept: OBGYN CLINIC | Age: 27
End: 2022-02-10

## 2022-02-10 VITALS
BODY MASS INDEX: 43.99 KG/M2 | DIASTOLIC BLOOD PRESSURE: 62 MMHG | HEIGHT: 61 IN | SYSTOLIC BLOOD PRESSURE: 118 MMHG | WEIGHT: 233 LBS

## 2022-02-10 DIAGNOSIS — Z34.83 PRENATAL CARE, SUBSEQUENT PREGNANCY IN THIRD TRIMESTER: Primary | ICD-10-CM

## 2022-02-10 DIAGNOSIS — Z3A.38 38 WEEKS GESTATION OF PREGNANCY: ICD-10-CM

## 2022-02-10 PROCEDURE — 0502F SUBSEQUENT PRENATAL CARE: CPT | Performed by: ADVANCED PRACTICE MIDWIFE

## 2022-02-10 NOTE — TELEPHONE ENCOUNTER
Called patient back and told her to come to the office now so we can get her checked on now due to pain and movement concerns. Patient states she is not having any bleeding and the pain has gotten better.

## 2022-02-10 NOTE — PROGRESS NOTES
Uncomfortable in general, saw the chiropractor Tuesday and they \"released her uterine ligaments\" - she is sore, mildly tender along the left side of her uterus, uterus is soft, CNM can feel fetal movement. A SINGLE VERTEX 38W0D IUP IS SEEN. FETAL CARDIAC MOTION OBSERVED. LIMITED ANATOMY WAS VISUALIZED AND APPEARS WNL. EFW= 6 LB 10 OZ ( 32 %)  BPP=8/8  TENNILLE= 27.8 CM  PLACENTA APPEAR WITHIN NORMAL LIMITS. \    Pt considering IOL - if she agrees to IOL she wants to be delivered on 2/22/22 - pt to have an apt 2/21 for SVE- if favorable pt can come in on 2/22  TONJA 1 week then [facility] 2/21 both with BPP for Poly

## 2022-02-17 ENCOUNTER — ROUTINE PRENATAL (OUTPATIENT)
Dept: OBGYN CLINIC | Age: 27
End: 2022-02-17
Payer: COMMERCIAL

## 2022-02-17 VITALS — WEIGHT: 230 LBS | SYSTOLIC BLOOD PRESSURE: 124 MMHG | BODY MASS INDEX: 43.46 KG/M2 | DIASTOLIC BLOOD PRESSURE: 82 MMHG

## 2022-02-17 DIAGNOSIS — Z98.82 HX OF BREAST IMPLANTS, BILATERAL: ICD-10-CM

## 2022-02-17 DIAGNOSIS — Z34.80 SUPERVISION OF OTHER NORMAL PREGNANCY: Primary | ICD-10-CM

## 2022-02-17 PROBLEM — H66.90 OTITIS MEDIA: Status: ACTIVE | Noted: 2022-02-17

## 2022-02-17 PROBLEM — N30.90 CYSTITIS: Status: ACTIVE | Noted: 2022-02-17

## 2022-02-17 PROBLEM — F39 EPISODIC MOOD DISORDER (HCC): Status: ACTIVE | Noted: 2022-02-17

## 2022-02-17 PROBLEM — E66.01 SEVERE OBESITY (HCC): Status: RESOLVED | Noted: 2018-11-01 | Resolved: 2022-02-17

## 2022-02-17 PROBLEM — N30.20 CHRONIC CYSTITIS: Status: ACTIVE | Noted: 2022-02-17

## 2022-02-17 PROBLEM — M51.36 DEGENERATION OF INTERVERTEBRAL DISC OF LUMBAR REGION: Status: ACTIVE | Noted: 2022-02-17

## 2022-02-17 PROBLEM — K21.9 ESOPHAGEAL REFLUX: Status: ACTIVE | Noted: 2022-02-17

## 2022-02-17 PROBLEM — D64.9 ANEMIA: Status: ACTIVE | Noted: 2022-02-17

## 2022-02-17 PROBLEM — E66.3 OVERWEIGHT: Status: ACTIVE | Noted: 2022-02-17

## 2022-02-17 PROBLEM — G47.00 INSOMNIA: Status: ACTIVE | Noted: 2022-02-17

## 2022-02-17 PROCEDURE — 0502F SUBSEQUENT PRENATAL CARE: CPT | Performed by: OBSTETRICS & GYNECOLOGY

## 2022-02-17 NOTE — PATIENT INSTRUCTIONS
Week 39 of Your Pregnancy: Care Instructions  Your Care Instructions     During these final weeks, you may feel anxious to see your new baby. [place] babies often look different from what you see in pictures or movies. Right after birth, their heads may have a strange shape. Their eyes may be puffy. And their genitals may be swollen. They may also have very dry skin, or red marks on the eyelids, nose, or neck. Still, most parents think their babies are beautiful. Follow-up care is a key part of your treatment and safety. Be sure to make and go to all appointments, and call your doctor if you are having problems. It's also a good idea to know your test results and keep a list of the medicines you take. How can you care for yourself at home? Prepare to breastfeed  · If you are breastfeeding, continue to eat healthy foods. · If your health care provider recommends it, keep taking your prenatal vitamins. · Talk to your doctor before you take any medicine or supplement. Some medicines can affect your breast milk. · You can help prevent sore nipples if you feed your baby in the correct position. Nurses will help you learn to do this. Choose the right birth control after your baby is born  · If you are breastfeeding, you can still get pregnant. Use birth control if you don't want to get pregnant. · Intrauterine devices (IUDs) are very effective at preventing pregnancy and can provide birth control for 3 to 10 years, depending on the type. If you talk with your doctor before you have your baby, the IUD can be placed right after you give birth. If you decide you want to get pregnant later, you can have it removed. IUDs are safe to use while you are breastfeeding. · A hormonal implant is also very effective at preventing pregnancy. It is placed under the skin of the arm. This can be done right after you give birth. It releases the hormone progestin and prevents pregnancy for about 3 years.  This can also be removed by a doctor at any time. It is safe to use while you are breastfeeding. · Depo-Provera can be used while you are breastfeeding. It's a shot you get every 3 months. · Birth control pills work well. But you need a different kind of pill for the first few weeks after you give birth. And when you start taking these pills, you need to make sure to use another type of birth control for 7 days after you start your pack. · Diaphragms, cervical caps, and condoms with spermicide work less well after birth. If you have a diaphragm or cervical cap, talk to your doctor to see if you need a different size. · Tubal ligation (tying your tubes) and vasectomy are both permanent. These are good options if you are sure you are done having children. Where can you learn more? Go to http://www.gray.com/  Enter A811 in the search box to learn more about \"Week 39 of Your Pregnancy: Care Instructions. \"  Current as of: June 16, 2021               Content Version: 13.0  © 2006-2021 Healthwise[employer]. Care instructions adapted under license by [employer] (which disclaims liability or warranty for this information). If you have questions about a medical condition or this instruction, always ask your healthcare professional. [address] any warranty or liability for your use of this information.

## 2022-02-17 NOTE — PROGRESS NOTES
+FM, no VB, no VD, no LOF, rare contraction. . FH: 44. Pt declined examination today. Pt is considering moving her induction and will discuss with her midwife at her next visit. Precautions given.    Frandy Allen MD

## 2022-02-21 ENCOUNTER — ROUTINE PRENATAL (OUTPATIENT)
Dept: OBGYN CLINIC | Age: 27
End: 2022-02-21

## 2022-02-21 VITALS
SYSTOLIC BLOOD PRESSURE: 120 MMHG | HEIGHT: 61 IN | WEIGHT: 234 LBS | BODY MASS INDEX: 44.18 KG/M2 | DIASTOLIC BLOOD PRESSURE: 72 MMHG

## 2022-02-21 DIAGNOSIS — Z34.83 PRENATAL CARE, SUBSEQUENT PREGNANCY IN THIRD TRIMESTER: Primary | ICD-10-CM

## 2022-02-21 PROCEDURE — 0502F SUBSEQUENT PRENATAL CARE: CPT | Performed by: ADVANCED PRACTICE MIDWIFE

## 2022-02-21 NOTE — PATIENT INSTRUCTIONS
Week 39 of Your Pregnancy: Care Instructions  Your Care Instructions     During these final weeks, you may feel anxious to see your new baby. [place] babies often look different from what you see in pictures or movies. Right after birth, their heads may have a strange shape. Their eyes may be puffy. And their genitals may be swollen. They may also have very dry skin, or red marks on the eyelids, nose, or neck. Still, most parents think their babies are beautiful. Follow-up care is a key part of your treatment and safety. Be sure to make and go to all appointments, and call your doctor if you are having problems. It's also a good idea to know your test results and keep a list of the medicines you take. How can you care for yourself at home? Prepare to breastfeed  · If you are breastfeeding, continue to eat healthy foods. · If your health care provider recommends it, keep taking your prenatal vitamins. · Talk to your doctor before you take any medicine or supplement. Some medicines can affect your breast milk. · You can help prevent sore nipples if you feed your baby in the correct position. Nurses will help you learn to do this. Choose the right birth control after your baby is born  · If you are breastfeeding, you can still get pregnant. Use birth control if you don't want to get pregnant. · Intrauterine devices (IUDs) are very effective at preventing pregnancy and can provide birth control for 3 to 10 years, depending on the type. If you talk with your doctor before you have your baby, the IUD can be placed right after you give birth. If you decide you want to get pregnant later, you can have it removed. IUDs are safe to use while you are breastfeeding. · A hormonal implant is also very effective at preventing pregnancy. It is placed under the skin of the arm. This can be done right after you give birth. It releases the hormone progestin and prevents pregnancy for about 3 years.  This can also be removed by a doctor at any time. It is safe to use while you are breastfeeding. · Depo-Provera can be used while you are breastfeeding. It's a shot you get every 3 months. · Birth control pills work well. But you need a different kind of pill for the first few weeks after you give birth. And when you start taking these pills, you need to make sure to use another type of birth control for 7 days after you start your pack. · Diaphragms, cervical caps, and condoms with spermicide work less well after birth. If you have a diaphragm or cervical cap, talk to your doctor to see if you need a different size. · Tubal ligation (tying your tubes) and vasectomy are both permanent. These are good options if you are sure you are done having children. Where can you learn more? Go to http://www.gray.com/  Enter A811 in the search box to learn more about \"Week 39 of Your Pregnancy: Care Instructions. \"  Current as of: June 16, 2021               Content Version: 13.0  © 2006-2021 Healthwise, Incorporated. Care instructions adapted under license by [employer] (which disclaims liability or warranty for this information). If you have questions about a medical condition or this instruction, always ask your healthcare professional. [address] any warranty or liability for your use of this information.

## 2022-02-21 NOTE — PROGRESS NOTES
Doing well. Having some pains here and there. Reports GFM    LIMITED OB SCAN  A SINGLE VERTEX 39W4D IUP IS SEEN. FETAL CARDIAC MOTION OBSERVED. LIMITED ANATOMY WAS VISUALIZED AND APPEARS WNL. EFW= 7 LB 7OZ (45 %)  BPP=8/8  TENNILLE= 25 CM  PLACENTA APPEAR WITHIN NORMAL LIMITS.

## 2022-02-21 NOTE — PROGRESS NOTES
LIMITED OB SCAN  A SINGLE VERTEX 39W4D IUP IS SEEN. FETAL CARDIAC MOTION OBSERVED. LIMITED ANATOMY WAS VISUALIZED AND APPEARS WNL. EFW= 7 LB 7OZ (45 %)  BPP=8/8  TENNILLE= 25 CM  PLACENTA APPEAR WITHIN NORMAL LIMITS.     Pt desires to delay IOL - wants to wait for Natural labor or 41 weeks eval for IOL-  Declines SVE

## 2022-02-28 ENCOUNTER — ROUTINE PRENATAL (OUTPATIENT)
Dept: OBGYN CLINIC | Age: 27
End: 2022-02-28

## 2022-02-28 VITALS
HEIGHT: 61 IN | BODY MASS INDEX: 44.75 KG/M2 | DIASTOLIC BLOOD PRESSURE: 74 MMHG | WEIGHT: 237 LBS | SYSTOLIC BLOOD PRESSURE: 114 MMHG

## 2022-02-28 DIAGNOSIS — Z3A.40 40 WEEKS GESTATION OF PREGNANCY: ICD-10-CM

## 2022-02-28 DIAGNOSIS — Z34.83 PRENATAL CARE, SUBSEQUENT PREGNANCY IN THIRD TRIMESTER: Primary | ICD-10-CM

## 2022-02-28 PROCEDURE — 0502F SUBSEQUENT PRENATAL CARE: CPT | Performed by: ADVANCED PRACTICE MIDWIFE

## 2022-02-28 NOTE — PROGRESS NOTES
Doing well. Reports GFM  Declines exam  Had increased UC's on Friday, have subsided and now no SOL  Has f/u appt Thursday   Labor warnings rev    LIMITED OB SCAN  A SINGLE VERTEX 40W3D IUP IS SEEN. FETAL CARDIAC MOTION OBSERVED. LIMITED ANATOMY WAS VISUALIZED AND APPEARS WNL. BPP=8/8  TENNILLE= 21 CM  PLACENTA APPEAR WITHIN NORMAL LIMITS.

## 2022-03-02 NOTE — PATIENT INSTRUCTIONS
Week 41 of Your Pregnancy: Care Instructions  Overview     By now, you're probably tired of people asking you when the baby is going to be born. Your baby could come any day--even today! Your doctor wants to make sure that you have a safe birth. So the doctor may recommend giving you medicines to start labor or scheduling a  delivery. Most babies born after their due dates are healthy. But you may have tests to make sure everything is okay. This care sheet will help you prepare for giving birth after 41 weeks. Follow-up care is a key part of your treatment and safety. Be sure to make and go to all appointments, and call your doctor if you are having problems. It's also a good idea to know your test results and keep a list of the medicines you take. How can you care for yourself at home? At about 41 weeks  · Your doctor will measure the amount of fluid surrounding the baby and test your baby's movement and heart rate. · If your baby seems strong and well, your doctor might give you medicine to start labor. · If there are other concerns, your doctor may tell you that a  delivery would be best for you and your baby. After 42 weeks  · Your baby may be harder to deliver. · The placenta may no longer be able to meet your baby's needs. · The baby might have a bowel movement into the amniotic fluid, which could go into the baby's lungs. Ease or reduce swelling in your feet, ankles, hands, and fingers  · If your fingers are puffy, take off your rings. · Do not eat high-salt foods, such as potato chips. · Prop up your feet on a stool or couch as much as possible. Sleep with pillows under your feet. · Do not stand for long periods of time or wear tight shoes. · Wear support stockings. Where can you learn more? Go to http://www.[employer].com/  Enter K943 in the search box to learn more about \"Week 41 of Your Pregnancy: Care Instructions. \"  Current as of:  2021               Content Version: 13.0  © 9740-8693 Healthwise, Incorporated. Care instructions adapted under license by [employer] (which disclaims liability or warranty for this information). If you have questions about a medical condition or this instruction, always ask your healthcare professional. [address] any warranty or liability for your use of this information.

## 2022-03-03 ENCOUNTER — ROUTINE PRENATAL (OUTPATIENT)
Dept: OBGYN CLINIC | Age: 27
End: 2022-03-03

## 2022-03-03 VITALS
HEIGHT: 61 IN | SYSTOLIC BLOOD PRESSURE: 122 MMHG | DIASTOLIC BLOOD PRESSURE: 68 MMHG | WEIGHT: 234 LBS | BODY MASS INDEX: 44.18 KG/M2

## 2022-03-03 DIAGNOSIS — Z34.83 PRENATAL CARE, SUBSEQUENT PREGNANCY IN THIRD TRIMESTER: Primary | ICD-10-CM

## 2022-03-03 PROCEDURE — 0502F SUBSEQUENT PRENATAL CARE: CPT | Performed by: ADVANCED PRACTICE MIDWIFE

## 2022-03-03 NOTE — PROGRESS NOTES
advancely dilatated - membranes sweep per pt request  TONJA Monday or Tuesday for BPP and discuss IOL if not delivered-   Offered IOL this weekend if desires. LIMITED OB SCAN  A SINGLE VERTEX 41W0D IUP IS SEEN. FETAL CARDIAC MOTION OBSERVED. LIMITED ANATOMY WAS VISUALIZED AND APPEARS WNL. BPP= 8/8  TENNILLE= 19.39 CM  PLACENTA APPEAR WITHIN NORMAL LIMITS.

## 2022-03-05 ENCOUNTER — HOSPITAL ENCOUNTER (EMERGENCY)
Age: 27
Discharge: HOME | End: 2022-03-06
Payer: COMMERCIAL

## 2022-03-05 VITALS
TEMPERATURE: 98.6 F | HEIGHT: 61 IN | DIASTOLIC BLOOD PRESSURE: 88 MMHG | SYSTOLIC BLOOD PRESSURE: 131 MMHG | BODY MASS INDEX: 44.21 KG/M2 | HEART RATE: 82 BPM | RESPIRATION RATE: 16 BRPM

## 2022-03-05 DIAGNOSIS — O47.9 FALSE LABOR: ICD-10-CM

## 2022-03-05 DIAGNOSIS — O48.0 41 WEEKS GESTATION OF PREGNANCY: Primary | ICD-10-CM

## 2022-03-05 DIAGNOSIS — Z3A.41 41 WEEKS GESTATION OF PREGNANCY: Primary | ICD-10-CM

## 2022-03-05 PROCEDURE — 75810000275 HC EMERGENCY DEPT VISIT NO LEVEL OF CARE

## 2022-03-05 RX ORDER — ZOLPIDEM TARTRATE 5 MG/1
5 TABLET ORAL
Status: DISCONTINUED | OUTPATIENT
Start: 2022-03-05 | End: 2022-03-06 | Stop reason: HOSPADM

## 2022-03-05 RX ORDER — ZOLPIDEM TARTRATE 5 MG/1
5 TABLET ORAL
Status: COMPLETED | OUTPATIENT
Start: 2022-03-05 | End: 2022-03-06

## 2022-03-06 ENCOUNTER — HOSPITAL ENCOUNTER (INPATIENT)
Age: 27
LOS: 2 days | Discharge: HOME | DRG: 560 | End: 2022-03-08
Attending: OBSTETRICS & GYNECOLOGY | Admitting: ADVANCED PRACTICE MIDWIFE
Payer: COMMERCIAL

## 2022-03-06 LAB
ERYTHROCYTE [DISTWIDTH] IN BLOOD BY AUTOMATED COUNT: 14.6 % (ref 11.5–14.5)
HCT VFR BLD AUTO: 35.1 % (ref 35–47)
HGB BLD-MCNC: 11.1 G/DL (ref 11.5–16)
MCH RBC QN AUTO: 25.7 PG (ref 26–34)
MCHC RBC AUTO-ENTMCNC: 31.6 G/DL (ref 30–36.5)
MCV RBC AUTO: 81.3 FL (ref 80–99)
NRBC # BLD: 0 K/UL (ref 0–0.01)
NRBC BLD-RTO: 0 PER 100 WBC
PLATELET # BLD AUTO: 277 K/UL (ref 150–400)
PMV BLD AUTO: 12.5 FL (ref 8.9–12.9)
RBC # BLD AUTO: 4.32 M/UL (ref 3.8–5.2)
WBC # BLD AUTO: 12 K/UL (ref 3.6–11)

## 2022-03-06 PROCEDURE — 99282 EMERGENCY DEPT VISIT SF MDM: CPT

## 2022-03-06 PROCEDURE — 65270000029 HC RM PRIVATE

## 2022-03-06 PROCEDURE — 85027 COMPLETE CBC AUTOMATED: CPT

## 2022-03-06 PROCEDURE — 75410000002 HC LABOR FEE PER 1 HR

## 2022-03-06 PROCEDURE — 74011250637 HC RX REV CODE- 250/637: Performed by: ADVANCED PRACTICE MIDWIFE

## 2022-03-06 PROCEDURE — 76060000078 HC EPIDURAL ANESTHESIA

## 2022-03-06 PROCEDURE — 36415 COLL VENOUS BLD VENIPUNCTURE: CPT

## 2022-03-06 RX ORDER — DIPHENHYDRAMINE HYDROCHLORIDE 50 MG/ML
12.5 INJECTION, SOLUTION INTRAMUSCULAR; INTRAVENOUS
Status: DISCONTINUED | OUTPATIENT
Start: 2022-03-06 | End: 2022-03-07 | Stop reason: HOSPADM

## 2022-03-06 RX ORDER — SODIUM CHLORIDE 0.9 % (FLUSH) 0.9 %
5-40 SYRINGE (ML) INJECTION EVERY 8 HOURS
Status: DISCONTINUED | OUTPATIENT
Start: 2022-03-06 | End: 2022-03-07 | Stop reason: HOSPADM

## 2022-03-06 RX ORDER — TERBUTALINE SULFATE 1 MG/ML
0.25 INJECTION SUBCUTANEOUS AS NEEDED
Status: DISCONTINUED | OUTPATIENT
Start: 2022-03-06 | End: 2022-03-07 | Stop reason: HOSPADM

## 2022-03-06 RX ORDER — FENTANYL CITRATE 50 UG/ML
100 INJECTION, SOLUTION INTRAMUSCULAR; INTRAVENOUS ONCE
Status: ACTIVE | OUTPATIENT
Start: 2022-03-06 | End: 2022-03-07

## 2022-03-06 RX ORDER — NALOXONE HYDROCHLORIDE 0.4 MG/ML
0.4 INJECTION, SOLUTION INTRAMUSCULAR; INTRAVENOUS; SUBCUTANEOUS AS NEEDED
Status: DISCONTINUED | OUTPATIENT
Start: 2022-03-06 | End: 2022-03-07 | Stop reason: HOSPADM

## 2022-03-06 RX ORDER — EPHEDRINE SULFATE/0.9% NACL/PF 50 MG/5 ML
10 SYRINGE (ML) INTRAVENOUS
Status: DISCONTINUED | OUTPATIENT
Start: 2022-03-06 | End: 2022-03-07 | Stop reason: HOSPADM

## 2022-03-06 RX ORDER — FENTANYL/BUPIVACAINE/NS/PF 2-1250MCG
1-16 PREFILLED PUMP RESERVOIR EPIDURAL CONTINUOUS
Status: DISCONTINUED | OUTPATIENT
Start: 2022-03-06 | End: 2022-03-07 | Stop reason: HOSPADM

## 2022-03-06 RX ORDER — SODIUM CHLORIDE 0.9 % (FLUSH) 0.9 %
5-40 SYRINGE (ML) INJECTION AS NEEDED
Status: DISCONTINUED | OUTPATIENT
Start: 2022-03-06 | End: 2022-03-07 | Stop reason: HOSPADM

## 2022-03-06 RX ORDER — FENTANYL CITRATE 50 UG/ML
100 INJECTION, SOLUTION INTRAMUSCULAR; INTRAVENOUS
Status: DISCONTINUED | OUTPATIENT
Start: 2022-03-06 | End: 2022-03-07 | Stop reason: HOSPADM

## 2022-03-06 RX ORDER — BUTORPHANOL TARTRATE 1 MG/ML
2 INJECTION INTRAMUSCULAR; INTRAVENOUS
Status: DISCONTINUED | OUTPATIENT
Start: 2022-03-06 | End: 2022-03-07 | Stop reason: HOSPADM

## 2022-03-06 RX ADMIN — Medication 50 MCG: at 20:19

## 2022-03-06 RX ADMIN — ZOLPIDEM TARTRATE 5 MG: 5 TABLET, FILM COATED ORAL at 00:00

## 2022-03-06 NOTE — H&P
History & Physical    Name: Sterling Harding MRN: [id number]  SSN: [id number]    YOB: 1995  Age: 31 y.o. Sex: female        Subjective:     Estimated Date of Delivery: 22  OB History        2    Para   1    Term   1            AB        Living   1       SAB        IAB        Ectopic        Molar        Multiple   0    Live Births   1                Ms. Shailesh Barnard is admitted with pregnancy at [address] [place] [address] for induction of labor. Prenatal course was normal. Please see prenatal records for details. Patient Active Problem List    Diagnosis    Anemia    Chronic cystitis    Otitis media    Cystitis    Degeneration of intervertebral disc of lumbar region    Episodic mood disorder ([address]    Esophageal reflux    Insomnia    Overweight    Less than 8 weeks gestation of pregnancy     Cervical Ripening 22 @ 4pm. IOL . Provider: DILIA DAVEY P1  EDC by US   Pertinents:  Growth scan 34 weeks   POLY 29 cm fluid- at 36 weeks- BPP at 37 weeks- if increased from 34 MFM    [address] with third trimester labs   Refusing glucola- will do blood sugars fastings only - she is not wanting to do ac - glucometer sent in  Second baby with us   Testing for MTHFR- per pt request   Breast explant surgery- pt states she had a horrible implant reaction   PCOS- metformin to get pregnant and early in pregnancy, pt then stopped at some point does not remember when     IOB labs:   Genetic Screening:  Anatomy: posterior placenta, WNL   GTT: Declines, Hgb A1C 4.8, bringing in log with blood sugars  Flu:  TDAP:  Rhogam: AB positive  Third Tri Labs: 11.1/34.5, plt 256k  GBS: neg  COVID:    Pain mgmt. in labor: Epidural with first birth   Feeding:  Circ:  Social:  [facility]-    Indy Jennings- daughter- [employer] caught  This is Baby girl \"[place]\"      Depression, major, recurrent, mild ([address]    Polycystic ovaries    Anxiety    Borderline personality disorder ([address]     No specialty comments available.   Past Medical History:   Diagnosis Date    Anemia 2/17/2022    Anxiety     Borderline personality disorder [address] [address] [facility]     Degeneration of intervertebral disc of lumbar region 2/17/2022    Depression     History of removal of left breast implant 09/2020    History of removal of right breast implant 09/2020    Hx of breast implants, bilateral 11/30/2017    Removed due to implant reaction    PCOS (polycystic ovarian syndrome)     Polycystic disease, ovaries     Postpartum depression     Trauma     in the past    Unable to get pregnant, female 12/10/2020     Past Surgical History:   Procedure Laterality Date    HX BREAST AUGMENTATION  07/2017    AL BREAST SURGERY PROCEDURE UNLISTED  2017    Augmentation    AL REMOVAL INTACT BREAST IMPLANT  09/2020    bilateral breast implant removal     Social History     Occupational History    Not on file   Tobacco Use    Smoking status: Never Smoker    Smokeless tobacco: Never Used   Vaping Use    Vaping Use: Never used   Substance and Sexual Activity    Alcohol use: Not Currently     Alcohol/week: 0.0 standard drinks     Comment: 1 x/year    Drug use: Not Currently     Types: Marijuana     Comment: couple of months ago    Sexual activity: Yes     Partners: Male     Birth control/protection: None     Family History   Problem Relation Age of Onset    Cancer Mother         skin,thyroid,ovarian   Calder Alicia Migraines Mother     Depression Mother     Anxiety Mother     Bipolar Disorder Mother     Other Father         prediabetes    Heart Disease Father     Hypertension Father     No Known Problems Sister     Other Brother         esbergers    Diabetes Paternal Uncle        No Known Allergies  Prior to Admission medications    Medication Sig Start Date End Date Taking?  Authorizing Provider   OneTouch Verio Flex meter misc  12/6/21   Provider, Historical   OneTouch Delica Plus Lancet 33 gauge misc  11/8/21   Provider, Historical        Review of Systems: A comprehensive review of systems was negative except for that written in the HPI. Objective:     Vitals: There were no vitals filed for this visit. Physical Exam:  Patient without distress.   Heart: Regular rate   Lung: normal respiratory effort  Abdomen: soft, nontender  Fundus: soft and non tender  Perineum: blood absent, amniotic fluid absent  Cervical Exam: 4-5/C/-1 yesterday in [place]  Lower Extremities: neg VERNON  Membranes:  Intact  Fetal Heart Rate: {just placed on monitor      Prenatal Labs:   Lab Results   Component Value Date/Time    Rubella, External Non Immune 08/15/2018 12:00 AM    GrBStrep, External Negative 2022 12:00 AM    HBsAg, External Negative 08/15/2018 12:00 AM    HIV, External Non Reactive 2018 12:00 AM    RPR, External Non Reactive 08/15/2018 12:00 AM    Gonorrhea, External Negative 2018 12:00 AM    Chlamydia, External Negative 2018 12:00 AM        Assessment/Plan:   32 yo  SIUP @ 41 weeks 3 days  Post term IOL    Plan:   Admit  Labs  Pain management as desires   Signed By:  Milton Fields CNM     2022

## 2022-03-06 NOTE — DISCHARGE INSTRUCTIONS
Patient Education        Week 40 of Your Pregnancy: Care Instructions  Overview     By week 36, you have reached your due date. Your baby could be coming any day. But it's a good idea to think ahead to the next few weeks and what might happen. If this is your first time having a baby, try not to worry. If you don't start labor on your own by 41 or 42 weeks, your doctor may recommend giving you medicines to start labor. Talk to your doctor or midwife about breathing exercises you can do if you start to feel anxious or if you're trying to relax. Follow-up care is a key part of your treatment and safety. Be sure to make and go to all appointments, and call your doctor if you are having problems. It's also a good idea to know your test results and keep a list of the medicines you take. How can you care for yourself at home? Learn how labor can be started  · If you and your baby are both healthy and ready, and if your cervix has started to open, your doctor may \"break your water\" (rupture the amniotic sac). This often starts labor. · If your cervix is not quite ready, you may get a medicine called Pitocin through an IV to start contractions. · If your cervix is still very firm, you may have prostaglandin tablets (misoprostol) placed in your vagina to soften the cervix. Try guided imagery to help you relax  · Find a comfortable place to sit or lie down. Close your eyes. · Start by just taking a few deep breaths to help you relax. · Picture a setting that is calm and peaceful. This could be a beach, a mountain setting, a meadow, or a scene that you choose. · Imagine your scene, and try to add some detail. For example, is there a breeze? What does the meek look like? Is it clear, or are there clouds? · It often helps to add a path to your scene. For example, as you enter the meadow, imagine a path leading you through the meadow to the trees on the other side.  As you follow the path farther into the [facility] you feel more and more relaxed. · When you are deep into your scene and are feeling relaxed, take a few minutes to breathe slowly and feel the calm. · When you are ready, slowly take yourself out of the scene back to the present. Tell yourself that you will feel relaxed and refreshed and will bring that sense of calm with you. · Count to 3, and open your eyes. Where can you learn more? Go to http://www.gray.com/  Enter T922 in the search box to learn more about \"Week 40 of Your Pregnancy: Care Instructions. \"  Current as of: June 16, 2021               Content Version: 13.0  © 0980-6033 Healthwise, [employer]. Care instructions adapted under license by [employer] (which disclaims liability or warranty for this information). If you have questions about a medical condition or this instruction, always ask your healthcare professional. [address] any warranty or liability for your use of this information.

## 2022-03-06 NOTE — H&P
History & Physical    Name: Pineda Hrap MRN: [id number]  SSN: [id number]    YOB: 1995  Age: 31 y.o. Sex: female        Subjective:     Estimated Date of Delivery: 22  OB History        2    Para   1    Term   1            AB        Living   1       SAB        IAB        Ectopic        Molar        Multiple   0    Live Births   1                Ms. Chery Cuevas is admitted with pregnancy at 41w2d for contractions starting yesterday intermittently, picking back up this evening. . Prenatal course was normal. Please see prenatal records for details. Patient Active Problem List    Diagnosis    Anemia    Chronic cystitis    Otitis media    Cystitis    Degeneration of intervertebral disc of lumbar region    Episodic mood disorder ([address]    Esophageal reflux    Insomnia    Overweight    Less than 8 weeks gestation of pregnancy     Cervical Ripening 22 @ 4pm. IOL . Provider: DILIA    EDC by US   Pertinents:  Growth scan 34 weeks   POLY 29 cm fluid- at 36 weeks- BPP at 37 weeks- if increased from 34 MFM    [address] with third trimester labs   Refusing glucola- will do blood sugars fastings only - she is not wanting to do ac - glucometer sent in  Second baby with us   Testing for MTHFR- per pt request   Breast explant surgery- pt states she had a horrible implant reaction   PCOS- metformin to get pregnant and early in pregnancy, pt then stopped at some point does not remember when     IOB labs:   Genetic Screening:  Anatomy: posterior placenta, WNL   GTT: Declines, Hgb A1C 4.8, bringing in log with blood sugars  Flu:  TDAP:  Rhogam: AB positive  Third Tri Labs: 11.1/34.5, plt 256k  GBS: neg  COVID:    Pain mgmt.  in labor: Epidural with first birth   Feeding:  Circ:  Social:  Aliya Purvis-    Candelariacristiana Us- daughter- L-A caught  This is Baby girl \"Juniper\"      Depression, major, recurrent, mild ([address]    Polycystic ovaries    Anxiety    Borderline personality disorder [facility])     No specialty comments available. Past Medical History:   Diagnosis Date    Anemia 2/17/2022    Anxiety     Borderline personality disorder [address] [address] [facility]     Degeneration of intervertebral disc of lumbar region 2/17/2022    Depression     History of removal of left breast implant 09/2020    History of removal of right breast implant 09/2020    Hx of breast implants, bilateral 11/30/2017    Removed due to implant reaction    PCOS (polycystic ovarian syndrome)     Polycystic disease, ovaries     Unable to get pregnant, female 12/10/2020     Past Surgical History:   Procedure Laterality Date    HX BREAST AUGMENTATION  07/2017    [place] BREAST SURGERY PROCEDURE UNLISTED  2017    Augmentation    [place] REMOVAL INTACT BREAST IMPLANT  09/2020    bilateral breast implant removal     Social History     Occupational History    Not on file   Tobacco Use    Smoking status: Never Smoker    Smokeless tobacco: Never Used   Substance and Sexual Activity    Alcohol use: Not Currently     Alcohol/week: 0.0 standard drinks     Comment: 1 x/year    Drug use: Not Currently     Types: Marijuana     Comment: couple of months ago    Sexual activity: Yes     Partners: Male     Birth control/protection: None     Family History   Problem Relation Age of Onset    Cancer Mother         skin,thyroid,ovarian   [facility] Migraines Mother     Depression Mother     Anxiety Mother     Bipolar Disorder Mother     Other Father         prediabetes    Heart Disease Father     Hypertension Father     No Known Problems Sister     Other Brother         esbergers    Diabetes Paternal Uncle        No Known Allergies  Prior to Admission medications    Medication Sig Start Date End Date Taking?  Authorizing Provider   OneTouch Verio Flex meter misc  12/6/21   Provider, Historical   OneTouch Delica Plus Lancet 33 gauge misc  11/8/21   Provider, Historical        Review of Systems: A comprehensive review of systems was negative except for that written in the HPI. Objective:     Vitals: There were no vitals filed for this visit. Physical Exam:  Patient without distress. Heart: Regular rate  Lung: normal respiratory effort  Fundus: soft and non tender  Perineum: blood absent, amniotic fluid absent  Cervical Exam: 4.5/C/-1  Lower Extremities: minimal   Membranes:  Intact  Fetal Heart Rate: Reactive  Baseline: 125 per minute  Variability: moderate  Accelerations: yes  Decelerations: none  Uterine contractions: 4-6    Prenatal Labs:   Lab Results   Component Value Date/Time    Rubella, External Non Immune 08/15/2018 12:00 AM    GrBStrep, External Negative 2022 12:00 AM    HBsAg, External Negative 08/15/2018 12:00 AM    HIV, External Non Reactive 2018 12:00 AM    RPR, External Non Reactive 08/15/2018 12:00 AM    Gonorrhea, External Negative 2018 12:00 AM    Chlamydia, External Negative 2018 12:00 AM        Assessment/Plan:   32 yo  SIUP @ 41 weeks 2 days  Reassuring fetal status  Prodromal labor  Plan:   Reassess   Pt may walk then reassess after 2 hours   Pt may be discharged if she would rather go home and await stronger labor    Pt wants to go home - ambien 10 mg - pt will call in the morning to discuss possible admission and AROM for post term - wants to sleep if possible tonight and go home to see her child who was asleep when she left.    Signed By:  Jaja Wiseman CNM     2022

## 2022-03-07 ENCOUNTER — ANESTHESIA (OUTPATIENT)
Dept: LABOR AND DELIVERY | Age: 27
DRG: 560 | End: 2022-03-07
Payer: COMMERCIAL

## 2022-03-07 ENCOUNTER — ANESTHESIA EVENT (OUTPATIENT)
Dept: LABOR AND DELIVERY | Age: 27
DRG: 560 | End: 2022-03-07
Payer: COMMERCIAL

## 2022-03-07 PROBLEM — Z3A.41 41 WEEKS GESTATION OF PREGNANCY: Status: ACTIVE | Noted: 2022-03-07

## 2022-03-07 PROBLEM — O48.0 41 WEEKS GESTATION OF PREGNANCY: Status: ACTIVE | Noted: 2022-03-07

## 2022-03-07 PROBLEM — Z34.90 ENCOUNTER FOR ELECTIVE INDUCTION OF LABOR: Status: ACTIVE | Noted: 2022-03-07

## 2022-03-07 PROCEDURE — 77030014125 HC TY EPDRL BBMI -B: Performed by: ANESTHESIOLOGY

## 2022-03-07 PROCEDURE — 75410000003 HC RECOV DEL/VAG/CSECN EA 0.5 HR

## 2022-03-07 PROCEDURE — 74011250636 HC RX REV CODE- 250/636: Performed by: ADVANCED PRACTICE MIDWIFE

## 2022-03-07 PROCEDURE — 65410000002 HC RM PRIVATE OB

## 2022-03-07 PROCEDURE — 59400 OBSTETRICAL CARE: CPT | Performed by: ADVANCED PRACTICE MIDWIFE

## 2022-03-07 PROCEDURE — 74011000250 HC RX REV CODE- 250: Performed by: ANESTHESIOLOGY

## 2022-03-07 PROCEDURE — 10907ZC DRAINAGE OF AMNIOTIC FLUID, THERAPEUTIC FROM PRODUCTS OF CONCEPTION, VIA NATURAL OR ARTIFICIAL OPENING: ICD-10-PCS | Performed by: OBSTETRICS & GYNECOLOGY

## 2022-03-07 PROCEDURE — 00HU33Z INSERTION OF INFUSION DEVICE INTO SPINAL CANAL, PERCUTANEOUS APPROACH: ICD-10-PCS | Performed by: ANESTHESIOLOGY

## 2022-03-07 PROCEDURE — 74011250636 HC RX REV CODE- 250/636: Performed by: ANESTHESIOLOGY

## 2022-03-07 PROCEDURE — S2140 CORD BLOOD HARVESTING: HCPCS | Performed by: ADVANCED PRACTICE MIDWIFE

## 2022-03-07 PROCEDURE — 75410000002 HC LABOR FEE PER 1 HR

## 2022-03-07 PROCEDURE — 74011250637 HC RX REV CODE- 250/637: Performed by: ADVANCED PRACTICE MIDWIFE

## 2022-03-07 PROCEDURE — 75410000000 HC DELIVERY VAGINAL/SINGLE

## 2022-03-07 RX ORDER — OXYTOCIN/RINGER'S LACTATE 30/500 ML
87.3 PLASTIC BAG, INJECTION (ML) INTRAVENOUS AS NEEDED
Status: DISCONTINUED | OUTPATIENT
Start: 2022-03-07 | End: 2022-03-08 | Stop reason: HOSPADM

## 2022-03-07 RX ORDER — BUPIVACAINE HYDROCHLORIDE 2.5 MG/ML
INJECTION, SOLUTION EPIDURAL; INFILTRATION; INTRACAUDAL AS NEEDED
Status: DISCONTINUED | OUTPATIENT
Start: 2022-03-07 | End: 2022-03-07 | Stop reason: HOSPADM

## 2022-03-07 RX ORDER — NALOXONE HYDROCHLORIDE 0.4 MG/ML
0.4 INJECTION, SOLUTION INTRAMUSCULAR; INTRAVENOUS; SUBCUTANEOUS AS NEEDED
Status: DISCONTINUED | OUTPATIENT
Start: 2022-03-07 | End: 2022-03-08 | Stop reason: HOSPADM

## 2022-03-07 RX ORDER — OXYTOCIN/RINGER'S LACTATE 30/500 ML
10 PLASTIC BAG, INJECTION (ML) INTRAVENOUS AS NEEDED
Status: DISCONTINUED | OUTPATIENT
Start: 2022-03-07 | End: 2022-03-08 | Stop reason: HOSPADM

## 2022-03-07 RX ORDER — ACETAMINOPHEN 325 MG/1
650 TABLET ORAL
Status: DISCONTINUED | OUTPATIENT
Start: 2022-03-07 | End: 2022-03-08 | Stop reason: HOSPADM

## 2022-03-07 RX ORDER — FENTANYL CITRATE 50 UG/ML
INJECTION, SOLUTION INTRAMUSCULAR; INTRAVENOUS
Status: COMPLETED
Start: 2022-03-07 | End: 2022-03-07

## 2022-03-07 RX ORDER — HYDROCODONE BITARTRATE AND ACETAMINOPHEN 5; 325 MG/1; MG/1
1 TABLET ORAL
Status: DISCONTINUED | OUTPATIENT
Start: 2022-03-07 | End: 2022-03-08 | Stop reason: HOSPADM

## 2022-03-07 RX ORDER — HYDROCORTISONE ACETATE PRAMOXINE HCL 2.5; 1 G/100G; G/100G
CREAM TOPICAL AS NEEDED
Status: DISCONTINUED | OUTPATIENT
Start: 2022-03-07 | End: 2022-03-08 | Stop reason: HOSPADM

## 2022-03-07 RX ORDER — FENTANYL CITRATE 50 UG/ML
INJECTION, SOLUTION INTRAMUSCULAR; INTRAVENOUS AS NEEDED
Status: DISCONTINUED | OUTPATIENT
Start: 2022-03-07 | End: 2022-03-07 | Stop reason: HOSPADM

## 2022-03-07 RX ORDER — SODIUM CHLORIDE, SODIUM LACTATE, POTASSIUM CHLORIDE, CALCIUM CHLORIDE 600; 310; 30; 20 MG/100ML; MG/100ML; MG/100ML; MG/100ML
125 INJECTION, SOLUTION INTRAVENOUS CONTINUOUS
Status: DISCONTINUED | OUTPATIENT
Start: 2022-03-07 | End: 2022-03-08 | Stop reason: HOSPADM

## 2022-03-07 RX ORDER — ONDANSETRON 2 MG/ML
4 INJECTION INTRAMUSCULAR; INTRAVENOUS
Status: COMPLETED | OUTPATIENT
Start: 2022-03-07 | End: 2022-03-07

## 2022-03-07 RX ORDER — MAG HYDROX/ALUMINUM HYD/SIMETH 200-200-20
30 SUSPENSION, ORAL (FINAL DOSE FORM) ORAL
Status: COMPLETED | OUTPATIENT
Start: 2022-03-07 | End: 2022-03-07

## 2022-03-07 RX ORDER — BUPIVACAINE HYDROCHLORIDE 2.5 MG/ML
INJECTION, SOLUTION EPIDURAL; INFILTRATION; INTRACAUDAL
Status: COMPLETED
Start: 2022-03-07 | End: 2022-03-07

## 2022-03-07 RX ORDER — OXYTOCIN/RINGER'S LACTATE 30/500 ML
PLASTIC BAG, INJECTION (ML) INTRAVENOUS
Status: DISPENSED
Start: 2022-03-07 | End: 2022-03-07

## 2022-03-07 RX ORDER — IBUPROFEN 400 MG/1
800 TABLET ORAL EVERY 8 HOURS
Status: DISCONTINUED | OUTPATIENT
Start: 2022-03-07 | End: 2022-03-08 | Stop reason: HOSPADM

## 2022-03-07 RX ORDER — ZOLPIDEM TARTRATE 5 MG/1
5 TABLET ORAL
Status: DISCONTINUED | OUTPATIENT
Start: 2022-03-07 | End: 2022-03-08 | Stop reason: HOSPADM

## 2022-03-07 RX ADMIN — FENTANYL CITRATE 100 MCG: 50 INJECTION, SOLUTION INTRAMUSCULAR; INTRAVENOUS at 02:21

## 2022-03-07 RX ADMIN — ALUMINUM HYDROXIDE, MAGNESIUM HYDROXIDE, AND SIMETHICONE 30 ML: 200; 200; 20 SUSPENSION ORAL at 05:25

## 2022-03-07 RX ADMIN — Medication 10 ML/HR: at 02:28

## 2022-03-07 RX ADMIN — Medication 50 MCG: at 00:20

## 2022-03-07 RX ADMIN — BUPIVACAINE HYDROCHLORIDE 10 ML: 2.5 INJECTION, SOLUTION EPIDURAL; INFILTRATION; INTRACAUDAL; PERINEURAL at 02:21

## 2022-03-07 RX ADMIN — ONDANSETRON HYDROCHLORIDE 4 MG: 2 INJECTION, SOLUTION INTRAMUSCULAR; INTRAVENOUS at 05:25

## 2022-03-07 NOTE — ANESTHESIA PROCEDURE NOTES
Epidural Block    Patient location during procedure: OB  Start time: 3/7/2022 2:28 AM  Reason for block: labor epidural  Staffing  Performed: attending   Anesthesiologist: Gissel Guevara MD  Preanesthetic Checklist  Completed: patient identified, IV checked, site marked, risks and benefits discussed, surgical consent, monitors and equipment checked, pre-op evaluation and timeout performed  Block Placement  Patient position: sitting  Prep: DuraPrep  Sterility prep: cap, drape, gloves and mask  Sedation level: no sedation  Patient monitoring: continuous pulse oximetry and heart rate  Approach: midline  Location: lumbar  Lumbar location: L4-L5  Epidural  Loss of resistance technique: saline  Guidance: landmark technique  Needle  Needle type: Tuohy   Needle gauge: 17 G  Needle length: 9 cm  Needle insertion depth: 7 cm  Catheter type: end hole  Catheter size: 19 G  Catheter at skin depth: 12 cm  Catheter securement method: clear occlusive dressing, surgical tape and liquid medical adhesive  Assessment  Sensory level: T6  Block outcome: pain improved  Number of attempts: 1  Procedure assessment: patient tolerated procedure well with no immediate complications

## 2022-03-07 NOTE — PROGRESS NOTES
Labor Progress Note  Patient seen, fetal heart rate and contraction pattern evaluated, patient examined.   Visit Vitals  /89   Pulse 86   Temp 98.4 °F (36.9 °C)   Resp 16   LMP  (LMP Unknown)   Breastfeeding No       Physical Exam:  Cervical Exam:  6/C/-2  Membranes:  BBOW  Uterine Activity: occassional  Fetal Heart Rate: Reactive    Assessment/Plan:  Reassuring fetal status, Continue plan for vaginal delivery     Considering second dose of cytotec at 0000 versus AROM     19 cm fluid- AROM with FSE until fetal head is settled in pelvis    Cassia Zapata CNM

## 2022-03-07 NOTE — LACTATION NOTE
This note was copied from a baby's chart. Initial Lactation Consultation: Infant born vaginally this morning to a  mom at 39 4/7 weeks gestation. Mom nursed her first child with adequate milk supply. Mom has a history of PCOS and had been on metformin, but this infant was conceived spontaneously. Mom also had bilateral breast implants removed in  due to implant reaction. Observed infant at breast, deep latch noted with rhythmic sucking and gulping heard. Encouraged mom to offer both breasts at every feeding at least every 3 hours or in response to feeding cues.

## 2022-03-07 NOTE — PROGRESS NOTES
1821 - patient arrived to L&D room 10 for IOL. 0730 - Bedside and Verbal shift change report given to PALOMO BergeronRN (oncoming nurse) by SEBASTIAN Ashby RN (offgoing nurse). Report included the following information SBAR, MAR and Recent Results.

## 2022-03-07 NOTE — PROGRESS NOTES
Labor Progress Note  Patient seen, comfortable s/p epidural- fetal heart rate and contraction pattern evaluated, patient examined. Visit Vitals  /76   Pulse 99   Temp 98.6 °F (37 °C)   Resp 16   LMP  (LMP Unknown)   SpO2 94%   Breastfeeding No       Physical Exam:  Cervical Exam:  7/C/-2  Membranes:  Artificial Rupture of Membranes;  Amniotic Fluid: copius amount of clear fluid  Uterine Activity: occasional   Fetal Heart Rate: Reactive    Assessment/Plan:  Reassuring fetal status, Continue plan for vaginal delivery     Lucy Mandel CNM

## 2022-03-07 NOTE — L&D DELIVERY NOTE
Delivery Summary  Became Complete and +2, with strong urge to push. Pushed for 10 mins.  Live Female infant. Over intact perineum. Infant placed to maternal abdomen. Infant dried and stimulated, spontaneous cry. Cord allowed to cease pulsations, then doubly clamped and cut per patient. 3VC. Cordblood Obtained yes. Placenta and cord, spont naila, Intact. See QBL . All counts correct yes. To RR, Stable. Patient: Broderick Rangel MRN: [id number]  SSN: [id number]    YOB: 1995  Age: 31 y.o. Sex: female       Information for the patient's :  Theresa Garay [[id number]]       Labor Events:    Labor: No    Steroids: None   Cervical Ripening Date/Time: 3/7/2022 8:20 PM   Cervical Ripening Type: Misoprostol   Antibiotics During Labor: No   Rupture Identifier:      Rupture Date/Time:       Rupture Type: AROM   Amniotic Fluid Volume: Moderate    Amniotic Fluid Description:      Amniotic Fluid Odor:      Induction: AROM       Induction Date/Time: 3/7/2022 3:40 AM    Indications for Induction: Post-term Gestation    Augmentation: AROM; Misoprostol   Augmentation Date/Time: 3/7/02307:20 PM   Indications for Augmentation: Ineffective Contraction Pattern   Labor complications: None       Additional complications:        Delivery Events:  Indications For Episiotomy:     Episiotomy: None   Perineal Laceration(s): None   Repaired:     Periurethral Laceration Location:      Repaired:     Labial Laceration Location:     Repaired:     Sulcal Laceration Location:     Repaired:     Vaginal Laceration Location:     Repaired:     Cervical Laceration Location:     Repaired:     Repair Suture: None   Number of Repair Packets:     Estimated Blood Loss (ml):  ml   Quantitative Blood Loss (ml)                Delivery Date: 3/7/2022    Delivery Time: 5:45 AM  Delivery Type: Vaginal, Spontaneous  Sex:  Female    Gestational Age: 41w4d   Delivery Clinician:  Denise Greene  Living Status: Living   Delivery Location: L&D            APGARS  One minute Five minutes Ten minutes   Skin color: 1   1        Heart rate: 2   2        Grimace: 2   2        Muscle tone: 2   2        Breathin   2        Totals: 9   9            Presentation: Vertex    Position:   Occiput Anterior  Resuscitation Method:  Suctioning-bulb; Tactile Stimulation     Meconium Stained: None      Cord Information: 3 Vessels  Complications: Nuchal Cord Without Compressions  Cord around: head  Delayed cord clamping? Yes  Cord clamped date/time:   Disposition of Cord Blood: Discard    Blood Gases Sent?: No    Placenta:  Date/Time: 3/7/2022  5:50 AM  Removal: Spontaneous      Appearance: Normal     [place] Measurements:  Birth Weight:        Birth Length:        Head Circumference:        Chest Circumference:       Abdominal Girth: Other Providers:   ;Janice TONG.;TONIO CHILDRESS;;;;;;KARTHIK WINTERS, Obstetrician;Primary Nurse;Primary  Nurse;Nicu Nurse;Neonatologist;Anesthesiologist;Crna;Nurse Practitioner;Midwife;Nursery Nurse           Group B Strep:   Lab Results   Component Value Date/Time    GrBStrep, External Negative 2022 12:00 AM     Information for the patient's :  Pasquale Martino [[id number]]   No results found for: ABORH, PCTABR, PCTDIG, BILI, ABORHEXT, ABORH     No results for input(s): PCO2CB, PO2CB, HCO3I, SO2I, IBD, PTEMPI, SPECTI, PHICB, ISITE, IDEV, IALLEN in the last 72 hours.

## 2022-03-07 NOTE — ANESTHESIA PREPROCEDURE EVALUATION
Anesthetic History   No history of anesthetic complications            Review of Systems / Medical History  Patient summary reviewed, nursing notes reviewed and pertinent labs reviewed    Pulmonary  Within defined limits                 Neuro/Psych         Psychiatric history     Cardiovascular  Within defined limits                     GI/Hepatic/Renal  Within defined limits              Endo/Other        Morbid obesity and arthritis     Other Findings              Physical Exam    Airway  Mallampati: II  TM Distance: 4 - 6 cm  Neck ROM: normal range of motion   Mouth opening: Normal     Cardiovascular  Regular rate and rhythm,  S1 and S2 normal,  no murmur, click, rub, or gallop             Dental  No notable dental hx       Pulmonary  Breath sounds clear to auscultation               Abdominal  GI exam deferred       Other Findings            Anesthetic Plan    ASA: 3  Anesthesia type: epidural            Anesthetic plan and risks discussed with: Patient

## 2022-03-07 NOTE — PROGRESS NOTES
0730 Bedside report recd from Maya Arreaga. Pt resting comfortably in bed bonding with infant. No c/o pain. 0830 Pt tx to MIU room 40 with infant and all belongings.

## 2022-03-07 NOTE — H&P
Labor Progress Note  Patient seen, fetal heart rate and contraction pattern evaluated-  Visit Vitals  /89   Pulse 86   Temp 98.4 °F (36.9 °C)   Resp 16   LMP  (LMP Unknown)   Breastfeeding No       Physical Exam:  Cervical Exam:  Deferred - pt was checked at 0100   Membranes:  Intact  Uterine Activity: occassional  Fetal Heart Rate: Reactive    Assessment/Plan:  Reassuring fetal status, Continue plan for vaginal delivery     Reviewed options of AROM versus cytotec to stimulate labor  Pt would like to try cytotec first     Miltonwoody Fields CNM

## 2022-03-08 VITALS
OXYGEN SATURATION: 97 % | TEMPERATURE: 98.6 F | DIASTOLIC BLOOD PRESSURE: 82 MMHG | RESPIRATION RATE: 16 BRPM | HEART RATE: 83 BPM | SYSTOLIC BLOOD PRESSURE: 113 MMHG
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
## 2022-03-08 NOTE — ROUTINE PROCESS
Bedside and Verbal shift change report given to BRITTANY Hernández RN (oncoming nurse) by Erin Novak. Devika Heath RN (offgoing nurse). Report included the following information SBAR.

## 2022-03-08 NOTE — ROUTINE PROCESS
0800: Bedside and Verbal shift change report given to Lennox Rockers, RN   (oncoming nurse) by BRITTANY Goncalves RN (offgoing nurse). Report included the following information SBAR.

## 2022-03-08 NOTE — DISCHARGE SUMMARY
Obstetrical Discharge Summary     Name: Mikael Horn MRN: [id number]  SSN: [id number]    YOB: 1995  Age: 31 y.o. Sex: female      Allergies: Patient has no known allergies. Admit Date: 3/6/2022    Discharge Date: 3/8/2022     Admitting Physician: Jaja Wiseman CNM     Attending Physician:  Anya Schwarz*     * Admission Diagnoses: 41 weeks gestation of pregnancy [Z3A.41]  Encounter for elective induction of labor [Z34.90]    * Discharge Diagnoses:   Information for the patient's :  Philomena Perez [[id number]]   Delivery of a 9 lb 5.7 oz (4.245 kg) female infant via Vaginal, Spontaneous on 3/7/2022 at 5:45 AM  by Jaja Wiseman. Apgars were 9  and 9 . Additional Diagnoses:   Hospital Problems as of 3/8/2022 Date Reviewed: 3/3/2022          Codes Class Noted - Resolved POA    41 weeks gestation of pregnancy ICD-10-CM: Z3A.41  ICD-9-CM: 645.10  3/7/2022 - Present Unknown        Encounter for elective induction of labor ICD-10-CM: Z34.90  ICD-9-CM: V22.1  3/7/2022 - Present Unknown             Lab Results   Component Value Date/Time    ABO/Rh(D) AB POSITIVE 2021 11:41 AM    Rubella, External Non Immune 08/15/2018 12:00 AM    GrBStrep, External Negative 2022 12:00 AM    ABO,Rh AB Positive 08/15/2018 12:00 AM      Immunization History   Administered Date(s) Administered    Influenza Vaccine [employer]) PF (>6 Mo Flulaval, Fluarix, and >3 Yrs Afluria, Fluzone 52543) 2017, 2018    MMR 2019    Tdap 2016, 2018       * Procedures:  of LFI  * No surgery found *           * Discharge Condition: good    * Hospital Course: Normal hospital course following the delivery.     * Disposition: Home    Discharge Medications:   Current Discharge Medication List      STOP taking these medications       OneTouch Verio Flex meter misc Comments:   Reason for Stopping:         OneTouch Delica Plus Lancet 33 gauge misc Comments: Reason for Stopping:               * Follow-up Care/Patient Instructions:   Activity: Activity as tolerated, No sex for 6 weeks and No heavy lifting for 6 weeks  Diet: Regular Diet  Wound Care: None needed    Follow up with CNM 6 weeks

## 2022-03-08 NOTE — LACTATION NOTE
This note was copied from a baby's chart. Baby nursing well and has improved throughout post partum stay, deep latch maintained, mother is comfortable, milk is in transition, baby feeding vigorously with rhythmic suck, swallow, breathe pattern, with audible swallowing, and evident milk transfer, both breasts offered, baby is asleep following feeding. Baby is feeding on demand, voiding and stools present as appropriate since birth. Weight loss:  4.3%    Breasts may become engorged when milk \"comes in\". How milk is made / normal phases of milk production, supply and demand discussed. Taught care of engorged breasts - frequent breastfeeding encouraged and breast massage ac. Then nurse the baby (or pump minimally for comfort). Apply cold compresses ac and/or pc x 15 minutes a few times a day for swelling or discomfort. May need to do this care for a couple of days. Discussed prevention and treatment of mastitis.

## 2022-03-08 NOTE — PROGRESS NOTES
Post-Partum Day Number 2 Progress/Discharge Note    Patient doing well post-partum without significant complaint. She is voiding without difficulty, she reports normal lochia. She is ambulatiing without dizziness. Her pain is well controlled with oral pain medication. She is tolerating general diet. Vitals:  Patient Vitals for the past 8 hrs:   BP Temp Pulse Resp SpO2   22 0415 105/70 98.1 °F (36.7 °C) 84 16 97 %     Temp (24hrs), Av.1 °F (36.7 °C), Min:97.6 °F (36.4 °C), Max:98.7 °F (37.1 °C)        Exam:  Patient without distress. Abdomen soft, fundus firm at level of umbilicus, nontender               Lower extremities are negative for cords or tenderness; no swelling. Labs: No results found for this or any previous visit (from the past 24 hour(s)). Lab Results   Component Value Date/Time    Rubella, External Non Immune 08/15/2018 12:00 AM    GrBStrep, External Negative 2022 12:00 AM    HBsAg, External Negative 08/15/2018 12:00 AM    HIV, External Non Reactive 2018 12:00 AM    RPR, External Non Reactive 08/15/2018 12:00 AM    Gonorrhea, External Negative 2018 12:00 AM    Chlamydia, External Negative 2018 12:00 AM       Assessment and Plan:    Postpartum Day #1, S/P . Doing well.    - d/c home   - F/U 6wk postpartum check, sooner prn

## 2022-03-11 ENCOUNTER — TELEPHONE (OUTPATIENT)
Dept: OBGYN CLINIC | Age: 27
End: 2022-03-11

## 2022-03-11 RX ORDER — HYDROCORTISONE ACETATE 25 MG/1
25 SUPPOSITORY RECTAL EVERY 12 HOURS
Qty: 10 SUPPOSITORY | Refills: 1 | Status: SHIPPED | OUTPATIENT
Start: 2022-03-11

## 2022-03-11 NOTE — TELEPHONE ENCOUNTER
Call received at 3:35Pm      31year old patient delivered on 3/7/2022     Patient is calling to ask where she might get a sitz bath.   Patient has been checking [facility], [facility] etc    This nurse advised [facility] pharmacy    Patient states she has about used up all the tucks from the hospital         [facility]    Patient is struggling with painful hemorrhoids    Please advise    Thank you

## 2022-03-18 PROBLEM — N30.90 CYSTITIS: Status: ACTIVE | Noted: 2022-02-17

## 2022-03-18 PROBLEM — K21.9 ESOPHAGEAL REFLUX: Status: ACTIVE | Noted: 2022-02-17

## 2022-03-18 PROBLEM — F33.0 DEPRESSION, MAJOR, RECURRENT, MILD (HCC): Status: ACTIVE | Noted: 2020-12-10

## 2022-03-18 PROBLEM — H66.90 OTITIS MEDIA: Status: ACTIVE | Noted: 2022-02-17

## 2022-03-18 PROBLEM — Z3A.01 LESS THAN 8 WEEKS GESTATION OF PREGNANCY: Status: ACTIVE | Noted: 2021-07-13

## 2022-03-18 PROBLEM — N30.20 CHRONIC CYSTITIS: Status: ACTIVE | Noted: 2022-02-17

## 2022-03-18 PROBLEM — D64.9 ANEMIA: Status: ACTIVE | Noted: 2022-02-17

## 2022-03-19 PROBLEM — E28.2 POLYCYSTIC OVARIES: Status: ACTIVE | Noted: 2018-04-25

## 2022-03-19 PROBLEM — G47.00 INSOMNIA: Status: ACTIVE | Noted: 2022-02-17

## 2022-03-19 PROBLEM — M51.369 DEGENERATION OF INTERVERTEBRAL DISC OF LUMBAR REGION: Status: ACTIVE | Noted: 2022-02-17

## 2022-03-19 PROBLEM — E66.3 OVERWEIGHT: Status: ACTIVE | Noted: 2022-02-17

## 2022-03-19 PROBLEM — M51.36 DEGENERATION OF INTERVERTEBRAL DISC OF LUMBAR REGION: Status: ACTIVE | Noted: 2022-02-17

## 2022-03-19 PROBLEM — F39 EPISODIC MOOD DISORDER (HCC): Status: ACTIVE | Noted: 2022-02-17

## 2022-03-19 PROBLEM — O48.0 41 WEEKS GESTATION OF PREGNANCY: Status: ACTIVE | Noted: 2022-03-07

## 2022-03-19 PROBLEM — Z3A.41 41 WEEKS GESTATION OF PREGNANCY: Status: ACTIVE | Noted: 2022-03-07

## 2022-03-19 PROBLEM — Z34.90 ENCOUNTER FOR ELECTIVE INDUCTION OF LABOR: Status: ACTIVE | Noted: 2022-03-07

## 2022-04-13 NOTE — PROGRESS NOTES
Postpartum evaluation    Nasreen De Souza is a 31 y.o. female who presents for a postpartum exam.     She is now six weeks post normal spontaneous vaginal delivery. Her baby is doing well. She has had no menses since delivery. She has had the following significant problems since her delivery: none    The patient is breast feeding with difficulty d/t poor latch. Concerned about possible tongue-tie. The patient would like to use non hormonal methods for birth control. She is currently taking: no medications. She is due for her next AE in 6-8 weeks months. Visit Vitals  /66   Ht 5' 1\" (1.549 m)   Wt 217 lb (98.4 kg)   Breastfeeding Yes   BMI 41.00 kg/m²       PHYSICAL EXAMINATION    Constitutional  · Appearance: well-nourished, well developed, alert, in no acute distress    HENT  · Head and Face: appears normal    Neck  · Inspection/Palpation: normal appearance    Gastrointestinal  · Abdominal Examination: abdomen non-tender to palpation, no masses present  · Liver and spleen: no hepatomegaly present, spleen not palpable  · Hernias: no hernias identified    ·     Skin  · General Inspection: no rash, no lesions identified    Neurologic/Psychiatric  · Mental Status:  · Orientation: grossly oriented to person, place and time  · Mood and Affect: mood normal, affect appropriate    Assessment:  Normal postpartum check  rectus diastasis- 2 cm - every mother zach discussed - pt reviewed  Pt considering PFPT as well       Plan:  RTO for AE.   Rx for contraception: non hormonal methods      Ronnie Aviles CNM

## 2022-04-14 ENCOUNTER — OFFICE VISIT (OUTPATIENT)
Dept: OBGYN CLINIC | Age: 27
End: 2022-04-14
Payer: COMMERCIAL

## 2022-04-14 VITALS
BODY MASS INDEX: 40.97 KG/M2 | WEIGHT: 217 LBS | SYSTOLIC BLOOD PRESSURE: 122 MMHG | HEIGHT: 61 IN | DIASTOLIC BLOOD PRESSURE: 66 MMHG

## 2022-04-14 DIAGNOSIS — M62.08 RECTUS DIASTASIS: ICD-10-CM

## 2022-04-14 PROCEDURE — 0503F POSTPARTUM CARE VISIT: CPT | Performed by: ADVANCED PRACTICE MIDWIFE

## 2022-06-08 ENCOUNTER — OFFICE VISIT (OUTPATIENT)
Dept: OBGYN CLINIC | Age: 27
End: 2022-06-08
Payer: COMMERCIAL

## 2022-06-08 VITALS
DIASTOLIC BLOOD PRESSURE: 78 MMHG | SYSTOLIC BLOOD PRESSURE: 118 MMHG | WEIGHT: 223.8 LBS | BODY MASS INDEX: 42.25 KG/M2 | HEIGHT: 61 IN

## 2022-06-08 PROCEDURE — 0503F POSTPARTUM CARE VISIT: CPT | Performed by: ADVANCED PRACTICE MIDWIFE

## 2022-06-08 RX ORDER — METFORMIN HYDROCHLORIDE 1000 MG/1
1000 TABLET ORAL 2 TIMES DAILY WITH MEALS
COMMUNITY

## 2022-06-08 NOTE — PROGRESS NOTES
Postpartum evaluation    Cathy Escobar is a 31 y.o. female who presents for a postpartum exam.     She is now three months post partum. Her baby is doing well. She has had  menses since delivery. She has had the following significant problems since her delivery: none    The patient is breast and bottlefeeding without difficulty. The patient would like to discuss birth control - she does not want to get pregnant but also does not want to take hormones. Reviewed IUD Abhijit Santa, condoms-     She is currently taking: no medications. Visit Vitals  /78   Ht 5' 1\" (1.549 m)   Wt 223 lb 12.8 oz (101.5 kg)   BMI 42.29 kg/m²       PHYSICAL EXAMINATION    Constitutional  · Appearance: well-nourished, well developed, alert, in no acute distress    HENT  · Head and Face: appears normal    Neck  · Inspection/Palpation: normal appearance,    Gastrointestinal  · Abdominal Examination: abdomen non-tender to palpation, no masses present  · Liver and spleen: no hepatomegaly present, spleen not palpable  · Hernias: no hernias identified  · Rectus diastasis- much improved       Skin  · General Inspection: no rash, no lesions identified    Neurologic/Psychiatric  · Mental Status:  · Orientation: grossly oriented to person, place and time  · Mood and Affect: mood normal, affect appropriate    Assessment:  Normal postpartum check- pt has been doing yoga and feels her diastasis is improving    Follow up for AE in 1-6 months    Plan:  RTO for AE.       Pt will look up Roman Delaney- and let me know if she wants to have     Roula Ortiz CNM

## 2022-06-17 NOTE — PROGRESS NOTES
TRANSFER - IN REPORT:    Verbal report received from PALOMO Shahid RN (name) on Rahul Yanez  being received from L&D (unit) for routine progression of care      Report consisted of patients Situation, Background, Assessment and   Recommendations(SBAR). Information from the following report(s) SBAR was reviewed with the receiving nurse. Opportunity for questions and clarification was provided. Assessment completed upon patients arrival to unit and care assumed. 1145: Assisted patient to restroom where she passed an orange sized clot. Patient denies dizziness or feeling faint. Assisted patient with pericare and back to bed. Fundus firm at U-1 position, no additional bleeding noted. 1219: Called midwife, [employer], asked if okay to c/b. no

## 2022-06-18 NOTE — PROGRESS NOTES
Hi ladies this pap was sent under the wrong patient- Misha Clemente did not get a pap. Gino Gardner can speak to this more clearly- but I need to make sure Misha Clemente does not get charged for this, the lab is aware -   And is there a way to take the pap off her record?     Thank you   L-A

## 2022-09-08 ENCOUNTER — HOSPITAL ENCOUNTER (EMERGENCY)
Age: 27
Discharge: HOME | End: 2022-09-08
Attending: EMERGENCY MEDICINE
Payer: COMMERCIAL

## 2022-09-08 VITALS
RESPIRATION RATE: 19 BRPM | SYSTOLIC BLOOD PRESSURE: 140 MMHG | DIASTOLIC BLOOD PRESSURE: 92 MMHG | OXYGEN SATURATION: 98 % | HEIGHT: 61 IN | WEIGHT: 220 LBS | HEART RATE: 98 BPM | BODY MASS INDEX: 41.54 KG/M2 | TEMPERATURE: 98.6 F

## 2022-09-08 DIAGNOSIS — T63.481A ALLERGIC REACTION TO INSECT STING, ACCIDENTAL OR UNINTENTIONAL, INITIAL ENCOUNTER: Primary | ICD-10-CM

## 2022-09-08 PROCEDURE — 99283 EMERGENCY DEPT VISIT LOW MDM: CPT

## 2022-09-08 RX ORDER — CEPHALEXIN 500 MG/1
500 CAPSULE ORAL 4 TIMES DAILY
Qty: 28 CAPSULE | Refills: 0 | Status: SHIPPED | OUTPATIENT
Start: 2022-09-08 | End: 2022-09-15

## 2022-09-08 RX ORDER — DIPHENHYDRAMINE HCL 25 MG
25 CAPSULE ORAL
Qty: 20 CAPSULE | Refills: 0 | Status: SHIPPED | OUTPATIENT
Start: 2022-09-08 | End: 2022-09-18

## 2022-09-08 RX ORDER — HYDROCORTISONE 25 MG/G
CREAM TOPICAL 2 TIMES DAILY
Qty: 30 G | Refills: 0 | Status: SHIPPED | OUTPATIENT
Start: 2022-09-08

## 2022-09-08 NOTE — ED NOTES
Discharge instructions were given to the patient by Mónica Kenney. The patient left the Emergency Department ambulatory, alert and oriented and in no acute distress with 3 prescriptions. The patient was encouraged to call or return to the ED for worsening issues or problems and was encouraged to schedule a follow up appointment for continuing care. The patient verbalized understanding of discharge instructions and prescriptions, all questions were answered. The patient has no further concerns at this time.

## 2022-09-08 NOTE — ED NOTES
Emergency Department Nursing Plan of Care       The Nursing Plan of Care is developed from the Nursing assessment and Emergency Department Attending provider initial evaluation. The plan of care may be reviewed in the ED Provider note.     The Plan of Care was developed with the following considerations:   Patient / Family readiness to learn indicated by:verbalized understanding  Persons(s) to be included in education: patient  Barriers to Learning/Limitations:No    Signed     Dov Henderson RN    9/8/2022   1:43 PM

## 2022-09-08 NOTE — ED PROVIDER NOTES
EMERGENCY DEPARTMENT HISTORY AND PHYSICAL EXAM      Date: 9/8/2022  Patient Name: Maddy Velásquez    History of Presenting Illness     Chief Complaint   Patient presents with    Skin Problem     Right upper leg swelling, redness and warmth to touch x 2 days. Possible bug bite. History Provided By: Patient    HPI: Maddy Velásquez, 31 y.o. female without reported PMHx presents BIB self to the ED with cc of insect bite to the medial R thigh. Yesterday the patient was putting gas in her car when she felt several sudden sharp stings on her leg. She denies visualizing an insect. Since then she has had spreading erythema from the site. There is associated pain and pruritus. She has had minimal relief with topical Benadryl. Denies airway involvement, fevers, rash elsewhere. There are no other complaints, changes, or physical findings at this time. PCP: Other, None, MD    No current facility-administered medications on file prior to encounter. Current Outpatient Medications on File Prior to Encounter   Medication Sig Dispense Refill    metFORMIN (GLUCOPHAGE) 1,000 mg tablet Take 1,000 mg by mouth two (2) times daily (with meals). Indications: Dose unknown (Patient not taking: Reported on 9/8/2022)      hydrocortisone (Anusol-HC) 25 mg supp Insert 1 Suppository into rectum every twelve (12) hours.  Indications: hemorrhoids (Patient not taking: No sig reported) 10 Suppository 1       Past History     Past Medical History:  Past Medical History:   Diagnosis Date    Anemia 2/17/2022    Anxiety     Borderline personality disorder ([facility] [address]     Degeneration of intervertebral disc of lumbar region 2/17/2022    Depression     History of removal of left breast implant 09/2020    History of removal of right breast implant 09/2020    Hx of breast implants, bilateral 11/30/2017    Removed due to implant reaction    PCOS (polycystic ovarian syndrome)     Polycystic disease, ovaries     Postpartum depression     Trauma in the past    Unable to get pregnant, female 12/10/2020       Past Surgical History:  Past Surgical History:   Procedure Laterality Date    HX BREAST AUGMENTATION  07/2017    CA BREAST SURGERY PROCEDURE UNLISTED  2017    Augmentation    CA REMOVAL INTACT BREAST IMPLANT  09/2020    bilateral breast implant removal       Family History:  Family History   Problem Relation Age of Onset    Cancer Mother         skin,thyroid,ovarian    Migraines Mother     Depression Mother     Anxiety Mother     Bipolar Disorder Mother     Other Father         prediabetes    Heart Disease Father     Hypertension Father     No Known Problems Sister     Other Brother         esbergers    Diabetes Paternal Uncle        Social History:  Social History     Tobacco Use    Smoking status: Never    Smokeless tobacco: Never   Vaping Use    Vaping Use: Never used   Substance Use Topics    Alcohol use: Not Currently     Alcohol/week: 0.0 standard drinks     Comment: 1 x/year    Drug use: Not Currently     Types: Marijuana     Comment: couple of months ago       Allergies:  No Known Allergies      Review of Systems   Review of Systems   Constitutional:  Negative for diaphoresis and fever. Respiratory:  Negative for shortness of breath. Gastrointestinal:  Negative for nausea and vomiting. Skin:  Positive for rash. Neurological:  Negative for dizziness and syncope. Hematological:  Does not bruise/bleed easily. All other systems reviewed and are negative. Physical Exam   Physical Exam  Vitals and nursing note reviewed. Constitutional:       General: She is not in acute distress. Appearance: Normal appearance. She is well-developed. HENT:      Head: Normocephalic and atraumatic. Nose: Nose normal.      Mouth/Throat:      Mouth: Mucous membranes are moist.   Eyes:      General: Lids are normal.      Extraocular Movements: Extraocular movements intact.       Conjunctiva/sclera: Conjunctivae normal.   Pulmonary:      Effort: Pulmonary effort is normal.   Musculoskeletal:         General: Normal range of motion. Cervical back: Normal range of motion and neck supple. Skin:     General: Skin is warm and dry. Comments: Approximately 6 inch light pink wheal with raised borders to the medial R thigh. There are 4 small pink papules at the center, c/w insect stings. Area is blanchable. Neurological:      General: No focal deficit present. Mental Status: She is alert and oriented to person, place, and time. Psychiatric:         Mood and Affect: Mood normal.         Behavior: Behavior normal. Behavior is cooperative. Diagnostic Study Results     Labs -   No results found for this or any previous visit (from the past 12 hour(s)). Radiologic Studies -   No orders to display     CT Results  (Last 48 hours)      None          CXR Results  (Last 48 hours)      None              Medical Decision Making   I am the first provider for this patient. I reviewed the vital signs, available nursing notes, past medical history, past surgical history, family history and social history. Vital Signs-Reviewed the patient's vital signs. Patient Vitals for the past 12 hrs:   Temp Pulse Resp BP SpO2   09/08/22 1316 98.6 °F (37 °C) 98 19 (!) 140/92 98 %       Records Reviewed: Nursing Notes and Old Medical Records    Provider Notes (Medical Decision Making):   Appearance of rash favors insect stings/bites with subsequent hypersensitivity reaction. I recommended oral Benadryl and topical hydrocortisone cream.  I have a lower concern for cellulitis, however will provide a prescription for Keflex to be filled if there is no improvement with Benadryl and steroids over the next few days. The patient is in agreement this plan. Follow-up with PCP. ED Course:   Initial assessment performed. The patients presenting problems have been discussed, and they are in agreement with the care plan formulated and outlined with them.   I have encouraged them to ask questions as they arise throughout their visit. Critical Care Time: None    Disposition:  dc    PLAN:  1. Current Discharge Medication List        START taking these medications    Details   diphenhydrAMINE (BenadryL) 25 mg capsule Take 1 Capsule by mouth every six (6) hours as needed for Skin Irritation or Itching for up to 10 days. Qty: 20 Capsule, Refills: 0  Start date: 9/8/2022, End date: 9/18/2022      hydrocortisone 2 % lotion Apply  to affected area two (2) times a day. Qty: 29.6 mL, Refills: 0  Start date: 9/8/2022      cephALEXin (Keflex) 500 mg capsule Take 1 Capsule by mouth four (4) times daily for 7 days. Qty: 28 Capsule, Refills: 0  Start date: 9/8/2022, End date: 9/15/2022           2. Follow-up Information       Follow up With Specialties Details Why [address] [facility] EMERGENCY DEPT Emergency Medicine  As needed, If symptoms worsen [address]  Schedule an appointment as soon as possible for a visit  For follow up [address] [facility]  [phone]          Return to ED if worse     Diagnosis     Clinical Impression:   1. Allergic reaction to insect sting, accidental or unintentional, initial encounter          Please note that this dictation was completed with [employer], the computer voice recognition software. Quite often unanticipated grammatical, syntax, homophones, and other interpretive errors are inadvertently transcribed by the computer software. Please disregards these errors. Please excuse any errors that have escaped final proofreading.

## 2023-02-09 ENCOUNTER — HOSPITAL ENCOUNTER (EMERGENCY)
Age: 28
Discharge: HOME | End: 2023-02-09
Attending: NURSE ANESTHETIST, CERTIFIED REGISTERED
Payer: COMMERCIAL

## 2023-02-09 ENCOUNTER — APPOINTMENT (OUTPATIENT)
Dept: GENERAL RADIOLOGY | Age: 28
End: 2023-02-09
Attending: NURSE PRACTITIONER
Payer: COMMERCIAL

## 2023-02-09 VITALS
OXYGEN SATURATION: 97 % | HEART RATE: 84 BPM | SYSTOLIC BLOOD PRESSURE: 111 MMHG | DIASTOLIC BLOOD PRESSURE: 71 MMHG | RESPIRATION RATE: 16 BRPM | TEMPERATURE: 97 F

## 2023-02-09 DIAGNOSIS — K52.9 GASTROENTERITIS, ACUTE: Primary | ICD-10-CM

## 2023-02-09 LAB
ALBUMIN SERPL-MCNC: 3.8 G/DL (ref 3.5–5)
ALBUMIN/GLOB SERPL: 1 (ref 1.1–2.2)
ALP SERPL-CCNC: 72 U/L (ref 45–117)
ALT SERPL-CCNC: 29 U/L (ref 12–78)
ANION GAP SERPL CALC-SCNC: 5 MMOL/L (ref 5–15)
APPEARANCE UR: CLEAR
AST SERPL-CCNC: 22 U/L (ref 15–37)
BACTERIA URNS QL MICRO: NEGATIVE /HPF
BASOPHILS # BLD: 0 K/UL (ref 0–0.1)
BASOPHILS NFR BLD: 0 % (ref 0–1)
BILIRUB SERPL-MCNC: 0.8 MG/DL (ref 0.2–1)
BILIRUB UR QL: NEGATIVE
BUN SERPL-MCNC: 17 MG/DL (ref 6–20)
BUN/CREAT SERPL: 19 (ref 12–20)
CALCIUM SERPL-MCNC: 8.8 MG/DL (ref 8.5–10.1)
CHLORIDE SERPL-SCNC: 106 MMOL/L (ref 97–108)
CO2 SERPL-SCNC: 26 MMOL/L (ref 21–32)
COLOR UR: ABNORMAL
COMMENT, HOLDF: NORMAL
CREAT SERPL-MCNC: 0.89 MG/DL (ref 0.55–1.02)
DIFFERENTIAL METHOD BLD: ABNORMAL
EOSINOPHIL # BLD: 0 K/UL (ref 0–0.4)
EOSINOPHIL NFR BLD: 0 % (ref 0–7)
EPITH CASTS URNS QL MICRO: ABNORMAL /LPF
ERYTHROCYTE [DISTWIDTH] IN BLOOD BY AUTOMATED COUNT: 15.1 % (ref 11.5–14.5)
GLOBULIN SER CALC-MCNC: 3.7 G/DL (ref 2–4)
GLUCOSE SERPL-MCNC: 110 MG/DL (ref 65–100)
GLUCOSE UR STRIP.AUTO-MCNC: NEGATIVE MG/DL
HCG UR QL: NEGATIVE
HCT VFR BLD AUTO: 40.8 % (ref 35–47)
HGB BLD-MCNC: 12.4 G/DL (ref 11.5–16)
HGB UR QL STRIP: NEGATIVE
HYALINE CASTS URNS QL MICRO: ABNORMAL /LPF (ref 0–5)
IMM GRANULOCYTES # BLD AUTO: 0 K/UL (ref 0–0.04)
IMM GRANULOCYTES NFR BLD AUTO: 0 % (ref 0–0.5)
KETONES UR QL STRIP.AUTO: ABNORMAL MG/DL
LEUKOCYTE ESTERASE UR QL STRIP.AUTO: NEGATIVE
LIPASE SERPL-CCNC: 201 U/L (ref 73–393)
LYMPHOCYTES # BLD: 0.3 K/UL (ref 0.8–3.5)
LYMPHOCYTES NFR BLD: 3 % (ref 12–49)
MCH RBC QN AUTO: 24.2 PG (ref 26–34)
MCHC RBC AUTO-ENTMCNC: 30.4 G/DL (ref 30–36.5)
MCV RBC AUTO: 79.5 FL (ref 80–99)
MONOCYTES # BLD: 0.4 K/UL (ref 0–1)
MONOCYTES NFR BLD: 4 % (ref 5–13)
NEUTS SEG # BLD: 9.8 K/UL (ref 1.8–8)
NEUTS SEG NFR BLD: 93 % (ref 32–75)
NITRITE UR QL STRIP.AUTO: NEGATIVE
NRBC # BLD: 0 K/UL (ref 0–0.01)
NRBC BLD-RTO: 0 PER 100 WBC
PH UR STRIP: 7 (ref 5–8)
PLATELET # BLD AUTO: 296 K/UL (ref 150–400)
PMV BLD AUTO: 11.4 FL (ref 8.9–12.9)
POTASSIUM SERPL-SCNC: 4.4 MMOL/L (ref 3.5–5.1)
PROT SERPL-MCNC: 7.5 G/DL (ref 6.4–8.2)
PROT UR STRIP-MCNC: ABNORMAL MG/DL
RBC # BLD AUTO: 5.13 M/UL (ref 3.8–5.2)
RBC #/AREA URNS HPF: ABNORMAL /HPF (ref 0–5)
RBC MORPH BLD: ABNORMAL
SAMPLES BEING HELD,HOLD: NORMAL
SODIUM SERPL-SCNC: 137 MMOL/L (ref 136–145)
SP GR UR REFRACTOMETRY: 1.03 (ref 1–1.03)
TROPONIN I SERPL HS-MCNC: <4 NG/L (ref 0–51)
UR CULT HOLD, URHOLD: NORMAL
UROBILINOGEN UR QL STRIP.AUTO: 1 EU/DL (ref 0.2–1)
WBC # BLD AUTO: 10.5 K/UL (ref 3.6–11)
WBC URNS QL MICRO: ABNORMAL /HPF (ref 0–4)

## 2023-02-09 PROCEDURE — 93005 ELECTROCARDIOGRAM TRACING: CPT

## 2023-02-09 PROCEDURE — 96361 HYDRATE IV INFUSION ADD-ON: CPT

## 2023-02-09 PROCEDURE — 83690 ASSAY OF LIPASE: CPT

## 2023-02-09 PROCEDURE — 96374 THER/PROPH/DIAG INJ IV PUSH: CPT

## 2023-02-09 PROCEDURE — 85025 COMPLETE CBC W/AUTO DIFF WBC: CPT

## 2023-02-09 PROCEDURE — 36415 COLL VENOUS BLD VENIPUNCTURE: CPT

## 2023-02-09 PROCEDURE — 71046 X-RAY EXAM CHEST 2 VIEWS: CPT

## 2023-02-09 PROCEDURE — 74011250637 HC RX REV CODE- 250/637: Performed by: NURSE PRACTITIONER

## 2023-02-09 PROCEDURE — 84484 ASSAY OF TROPONIN QUANT: CPT

## 2023-02-09 PROCEDURE — 81001 URINALYSIS AUTO W/SCOPE: CPT

## 2023-02-09 PROCEDURE — 99285 EMERGENCY DEPT VISIT HI MDM: CPT

## 2023-02-09 PROCEDURE — 80053 COMPREHEN METABOLIC PANEL: CPT

## 2023-02-09 PROCEDURE — 74011250636 HC RX REV CODE- 250/636: Performed by: NURSE PRACTITIONER

## 2023-02-09 PROCEDURE — 81025 URINE PREGNANCY TEST: CPT

## 2023-02-09 RX ORDER — ACETAMINOPHEN 325 MG/1
650 TABLET ORAL
Qty: 20 TABLET | Refills: 0 | Status: SHIPPED | OUTPATIENT
Start: 2023-02-09

## 2023-02-09 RX ORDER — IBUPROFEN 800 MG/1
800 TABLET ORAL
Qty: 20 TABLET | Refills: 0 | Status: SHIPPED | OUTPATIENT
Start: 2023-02-09 | End: 2023-02-16

## 2023-02-09 RX ORDER — IBUPROFEN 400 MG/1
800 TABLET ORAL
Status: COMPLETED | OUTPATIENT
Start: 2023-02-09 | End: 2023-02-09

## 2023-02-09 RX ORDER — ONDANSETRON 4 MG/1
4 TABLET, ORALLY DISINTEGRATING ORAL
Qty: 10 TABLET | Refills: 0 | Status: SHIPPED | OUTPATIENT
Start: 2023-02-09 | End: 2023-02-14

## 2023-02-09 RX ORDER — ONDANSETRON 2 MG/ML
4 INJECTION INTRAMUSCULAR; INTRAVENOUS
Status: COMPLETED | OUTPATIENT
Start: 2023-02-09 | End: 2023-02-09

## 2023-02-09 RX ADMIN — IBUPROFEN 800 MG: 400 TABLET, FILM COATED ORAL at 16:45

## 2023-02-09 RX ADMIN — ONDANSETRON HYDROCHLORIDE 4 MG: 2 SOLUTION INTRAMUSCULAR; INTRAVENOUS at 15:14

## 2023-02-09 RX ADMIN — SODIUM CHLORIDE 1000 ML: 9 INJECTION, SOLUTION INTRAVENOUS at 15:14

## 2023-02-09 NOTE — ED PROVIDER NOTES
HPI   Patient is a 28 y.o. F who presents today with complaints of nausea and vomiting. Associated sore throat, nasal congestion, abdominal cramping, chest pain, fevers, chills, malaise. Denies diarrhea, passing gas at this time. States all of her symptoms started at 2 AM this morning. States both of her children are also symptomatic as well as her . Her children are currently in the pediatric ER receiving IV fluids. She states she has not been able to eat or drink anything all day, feels lightheaded. Has not taken any medications prior to arrival.    There are no other complaints, changes or physical findings at this time. ALLERGIES: Patient has no known allergies.     Past Medical History:   Diagnosis Date    Anemia 2/17/2022    Anxiety     Borderline personality disorder ([facility] [address]     Degeneration of intervertebral disc of lumbar region 2/17/2022    Depression     History of removal of left breast implant 09/2020    History of removal of right breast implant 09/2020    Hx of breast implants, bilateral 11/30/2017    Removed due to implant reaction    PCOS (polycystic ovarian syndrome)     Polycystic disease, ovaries     Postpartum depression     Trauma     in the past    Unable to get pregnant, female 12/10/2020     Past Surgical History:   Procedure Laterality Date    HX BREAST AUGMENTATION  07/2017    DE BREAST SURGERY PROCEDURE UNLISTED  2017    Augmentation    DE REMOVAL INTACT BREAST IMPLANT  09/2020    bilateral breast implant removal     Family History:   Problem Relation Age of Onset    Cancer Mother         skin,thyroid,ovarian    Migraines Mother     Depression Mother     Anxiety Mother     Bipolar Disorder Mother     Other Father         prediabetes    Heart Disease Father     Hypertension Father     No Known Problems Sister     Other Brother         esbergers    Diabetes Paternal Uncle      Social History     Socioeconomic History    Marital status:      Spouse name: Not on file Number of children: Not on file    Years of education: Not on file    Highest education level: Not on file   Occupational History    Not on file   Tobacco Use    Smoking status: Never    Smokeless tobacco: Never   Vaping Use    Vaping Use: Never used   Substance and Sexual Activity    Alcohol use: Not Currently     Alcohol/week: 0.0 standard drinks     Comment: 1 x/year    Drug use: Not Currently     Types: Marijuana     Comment: couple of months ago    Sexual activity: Yes     Partners: Male     Birth control/protection: None   Other Topics Concern     Service No    Blood Transfusions No    Caffeine Concern No    Occupational Exposure No    Hobby Hazards No    Sleep Concern No    Stress Concern No    Weight Concern No    Special Diet No    Back Care No    Exercise No    Bike Helmet No    Seat Belt No    Self-Exams No   Social History Narrative    Partner - Steve     Social Determinants of Health     Financial Resource Strain: Not on file   Food Insecurity: Not on file   Transportation Needs: Not on file   Physical Activity: Not on file   Stress: Not on file   Social Connections: Not on file   Intimate Partner Violence: Not on file   Housing Stability: Not on file           Review of Systems   Constitutional:  Positive for chills and fever. HENT:  Positive for congestion and sore throat. Eyes:  Negative for discharge. Respiratory:  Negative for shortness of breath. Cardiovascular:  Positive for chest pain. Gastrointestinal:  Positive for nausea and vomiting. Genitourinary:  Negative for dysuria. Musculoskeletal:  Positive for myalgias. Neurological:  Positive for light-headedness. Negative for seizures. Psychiatric/Behavioral:  Negative for behavioral problems. Vitals:    02/09/23 1312 02/09/23 1747   BP: 111/71    Pulse: (!) 118 84   Resp: 16    Temp: 97 °F (36.1 °C)    SpO2: 97%             Physical Exam  Constitutional:       General: She is not in acute distress. Appearance: Normal appearance. She is well-developed. She is not ill-appearing, toxic-appearing or diaphoretic. HENT:      Head: Normocephalic and atraumatic. Eyes:      Pupils: Pupils are equal, round, and reactive to light. Cardiovascular:      Rate and Rhythm: Normal rate and regular rhythm. Pulmonary:      Effort: Pulmonary effort is normal. No tachypnea or respiratory distress. Abdominal:      General: Abdomen is flat. Bowel sounds are normal.      Palpations: Abdomen is soft. Tenderness: There is abdominal tenderness (Generalized in all 4 quadrants). Musculoskeletal:      Cervical back: Neck supple. Skin:     General: Skin is dry. Neurological:      Mental Status: She is alert. Mental status is at baseline.    Psychiatric:         Mood and Affect: Mood normal.         Behavior: Behavior normal.            LABORATORY RESULTS:  Recent Results (from the past 24 hour(s))   EKG, 12 LEAD, INITIAL    Collection Time: 02/09/23  1:11 PM   Result Value Ref Range    Ventricular Rate 109 BPM    Atrial Rate 109 BPM    P-R Interval 152 ms    QRS Duration 80 ms    Q-T Interval 322 ms    QTC Calculation (Bezet) 433 ms    Calculated P Axis 54 degrees    Calculated R Axis 78 degrees    Calculated T Axis 16 degrees    Diagnosis       Sinus tachycardia  Otherwise normal ECG  No previous ECGs available     CBC WITH AUTOMATED DIFF    Collection Time: 02/09/23  2:27 PM   Result Value Ref Range    WBC 10.5 3.6 - 11.0 K/uL    RBC 5.13 3.80 - 5.20 M/uL    HGB 12.4 11.5 - 16.0 g/dL    HCT 40.8 35.0 - 47.0 %    MCV 79.5 (L) 80.0 - 99.0 FL    MCH 24.2 (L) 26.0 - 34.0 PG    MCHC 30.4 30.0 - 36.5 g/dL    RDW 15.1 (H) 11.5 - 14.5 %    PLATELET 526 457 - 405 K/uL    MPV 11.4 8.9 - 12.9 FL    NRBC 0.0 0  WBC    ABSOLUTE NRBC 0.00 0.00 - 0.01 K/uL    NEUTROPHILS 93 (H) 32 - 75 %    LYMPHOCYTES 3 (L) 12 - 49 %    MONOCYTES 4 (L) 5 - 13 %    EOSINOPHILS 0 0 - 7 %    BASOPHILS 0 0 - 1 %    IMMATURE GRANULOCYTES 0 0.0 - 0.5 %    ABS. NEUTROPHILS 9.8 (H) 1.8 - 8.0 K/UL    ABS. LYMPHOCYTES 0.3 (L) 0.8 - 3.5 K/UL    ABS. MONOCYTES 0.4 0.0 - 1.0 K/UL    ABS. EOSINOPHILS 0.0 0.0 - 0.4 K/UL    ABS. BASOPHILS 0.0 0.0 - 0.1 K/UL    ABS. IMM. GRANS. 0.0 0.00 - 0.04 K/UL    DF SMEAR SCANNED      RBC COMMENTS NORMOCYTIC, NORMOCHROMIC     METABOLIC PANEL, COMPREHENSIVE    Collection Time: 02/09/23  2:27 PM   Result Value Ref Range    Sodium 137 136 - 145 mmol/L    Potassium 4.4 3.5 - 5.1 mmol/L    Chloride 106 97 - 108 mmol/L    CO2 26 21 - 32 mmol/L    Anion gap 5 5 - 15 mmol/L    Glucose 110 (H) 65 - 100 mg/dL    BUN 17 6 - 20 MG/DL    Creatinine 0.89 0.55 - 1.02 MG/DL    BUN/Creatinine ratio 19 12 - 20      eGFR >60 >60 ml/min/1.73m2    Calcium 8.8 8.5 - 10.1 MG/DL    Bilirubin, total 0.8 0.2 - 1.0 MG/DL    ALT (SGPT) 29 12 - 78 U/L    AST (SGOT) 22 15 - 37 U/L    Alk. phosphatase 72 45 - 117 U/L    Protein, total 7.5 6.4 - 8.2 g/dL    Albumin 3.8 3.5 - 5.0 g/dL    Globulin 3.7 2.0 - 4.0 g/dL    A-G Ratio 1.0 (L) 1.1 - 2.2     SAMPLES BEING HELD    Collection Time: 02/09/23  2:27 PM   Result Value Ref Range    SAMPLES BEING HELD 1 BLUE     COMMENT        Add-on orders for these samples will be processed based on acceptable specimen integrity and analyte stability, which may vary by analyte.    LIPASE    Collection Time: 02/09/23  2:27 PM   Result Value Ref Range    Lipase 201 73 - 393 U/L   TROPONIN-HIGH SENSITIVITY    Collection Time: 02/09/23  2:27 PM   Result Value Ref Range    Troponin-High Sensitivity <4 0 - 51 ng/L   HCG URINE, QL. - POC    Collection Time: 02/09/23  4:39 PM   Result Value Ref Range    Pregnancy test,urine (POC) Negative NEG     URINALYSIS W/MICROSCOPIC    Collection Time: 02/09/23  4:41 PM   Result Value Ref Range    Color YELLOW/STRAW      Appearance CLEAR CLEAR      Specific gravity 1.030 1.003 - 1.030      pH (UA) 7.0 5.0 - 8.0      Protein TRACE (A) NEG mg/dL    Glucose Negative NEG mg/dL    Ketone TRACE (A) NEG mg/dL    Bilirubin Negative NEG      Blood Negative NEG      Urobilinogen 1.0 0.2 - 1.0 EU/dL    Nitrites Negative NEG      Leukocyte Esterase Negative NEG      WBC 0-4 0 - 4 /hpf    RBC 0-5 0 - 5 /hpf    Epithelial cells FEW FEW /lpf    Bacteria Negative NEG /hpf    Hyaline cast 0-2 0 - 5 /lpf   URINE CULTURE HOLD SAMPLE    Collection Time: 02/09/23  4:41 PM    Specimen: Urine   Result Value Ref Range    Urine culture hold        Urine on hold in Microbiology dept for 2 days. If unpreserved urine is submitted, it cannot be used for addtional testing after 24 hours, recollection will be required. IMAGING RESULTS:  XR CHEST PA LAT    Result Date: 2/9/2023  No acute intrathoracic disease. MEDICATIONS GIVEN:  Medications   ibuprofen (MOTRIN) tablet 800 mg (800 mg Oral Given 2/9/23 1645)   ondansetron (ZOFRAN) injection 4 mg (4 mg IntraVENous Given 2/9/23 1514)   sodium chloride 0.9 % bolus infusion 1,000 mL (0 mL IntraVENous IV Completed 2/9/23 1614)            MDM  Number of Diagnoses or Management Options  Gastroenteritis, acute  Diagnosis management comments: Patient presents today with complaints of nausea and vomiting. She states with her children as well as her  are affected. Abdominal exam pertinent for generalized abdominal tenderness in all 4 quadrants, but nonperitoneal. Considered but low risk for SBO (normal BM, passing flatus, no abdominal surgeries), no signs of DKA in labs. Patient BMP with normal electrolytes and no sign of dehydration causing prerenal ELLA. Patient with does complain of chest pain, unremarkable EKG and high-sensitivity troponin less than 4 so low suspicion for ACS. Chest x-ray obtained to rule out esophageal perforation from multiple episodes of emesis. chest x-ray unremarkable.   Based on history, exam, and work up, low suspicion for pancreatitis (lipase 201), appendicitis(no leukocytosis or white cells in urine, no rebound tenderness in the right lower quadrant), biliary pathology unlikely as FAMILY might have similar symptoms, or other emergent problem. Patient given zofran and tolerated PO here. Tachycardia resolved after liter of fluids. patient to be discharged with zofran and to follow up with PMD.         Amount and/or Complexity of Data Reviewed  Clinical lab tests: reviewed  Tests in the radiology section of CPT®: reviewed  Tests in the medicine section of CPT®: reviewed              ED Course as of 02/09/23 2009   Thu Feb 09, 2023   1338 EKG, 12 LEAD, INITIAL  ED EKG interpretation:  Rhythm: sinus tach. Rate (approx.): 109. Axis: normal.  ST segment:  No concerning ST elevations or depressions. This EKG was independently interpreted by Lucy Torres MD,ED Provider. [JM]      ED Course User Index  [JM] Lashawn Kinney MD       Discussed results and work-up with patient and answered all questions, the patient expresses understanding and agrees with the care plan and disposition. The patient was given an opportunity to ask questions and all concerns raised were addressed prior to discharge. Recommended patient follow-up with provider as listed below. Counseled patient on standard home and self-care measures. Specifically explained the emergent conditions that could arise and clearly instructed the patient to return to the emergency department for those and any other new, worsening, or concerning symptoms. Patient stable and ready for discharge. IMPRESSION:  1. Gastroenteritis, acute        DISPOSITION:  Discharge    PLAN:  Follow-up Information    None       Discharge Medication List as of 2/9/2023  5:38 PM        START taking these medications    Details   ondansetron (ZOFRAN ODT) 4 mg disintegrating tablet Take 1 Tablet by mouth every eight (8) hours as needed for Nausea or Vomiting for up to 5 days. , Normal, Disp-10 Tablet, R-0      ibuprofen (MOTRIN) 800 mg tablet Take 1 Tablet by mouth every six (6) hours as needed for Pain for up to 7 days. , Normal, Disp-20 Tablet, R-0      acetaminophen (TYLENOL) 325 mg tablet Take 2 Tablets by mouth every four (4) hours as needed for Pain., Normal, Disp-20 Tablet, R-0           CONTINUE these medications which have NOT CHANGED    Details   hydrocortisone 2 % lotion Apply  to affected area two (2) times a day., Normal, Disp-29.6 mL, R-0      hydrocortisone (HYTONE) 2.5 % topical cream Apply  to affected area two (2) times a day. use thin layer, Normal, Disp-30 g, R-0      metFORMIN (GLUCOPHAGE) 1,000 mg tablet Take 1,000 mg by mouth two (2) times daily (with meals). Indications: Dose unknown, Historical Med      hydrocortisone (Anusol-HC) 25 mg supp Insert 1 Suppository into rectum every twelve (12) hours. Indications: hemorrhoids, Normal, Disp-10 Suppository, R-1             Please note that this dictation was completed with [employer] the [employer] voice recognition software. Quite often unanticipated grammatical, syntax, homophones, and other interpretive errors are inadvertently transcribed by the computer software. Please disregard these errors. Please excuse any errors that have escaped final proofreading.

## 2023-02-09 NOTE — ED NOTES
Patient (s)  given copy of dc instructions and script(s). Patient (s)  verbalized understanding of instructions and script (s). Patient given a current medication reconciliation form and verbalized understanding of their medications. Patient (s) verbalized understanding of the importance of discussing medications with  his or her physician or clinic they will be following up with. Patient alert and oriented and in no acute distress. Patient discharged home ambulatory with all belongings.

## 2023-02-09 NOTE — ED TRIAGE NOTES
Pt c/o chest pain, n/v, body aches, denies fever, denies diarrhea, +sore throat, 2 kids in peds with similar symptoms

## 2023-02-09 NOTE — Clinical Note
[address]  [address] [facility] [address]  [phone]    Work/School Note    Date: 2/9/2023    To Whom It May concern:    Maryam Cabrera was seen and treated today in the emergency room by the following provider(s):  Attending Provider: Akbar Ford MD  Nurse Practitioner: Charmayne Pickler, NP.      Maryam Cabrera is excused from work/school on 2/9/2023 through 2/11/2023. She is medically clear to return to work/school on 2/12/2023.          Sincerely,          Tammie Snyder NP

## 2023-02-09 NOTE — DISCHARGE INSTRUCTIONS
You were seen in the emergency department today for nausea and vomiting. While you were here you had an EKG, blood work, urinalysis and chest x-ray that were all reassuring. See the attached information for the results of your testing. You should follow-up with your primary care provider in the next couple of days. You can take over the over-the-counter medications that we discussed for your symptoms. Return if you develop any difficulty breathing, chest pain, or any other new or concerning symptoms. Allan Currie

## 2023-02-10 LAB
ATRIAL RATE: 109 BPM
CALCULATED P AXIS, ECG09: 54 DEGREES
CALCULATED R AXIS, ECG10: 78 DEGREES
CALCULATED T AXIS, ECG11: 16 DEGREES
DIAGNOSIS, 93000: NORMAL
P-R INTERVAL, ECG05: 152 MS
Q-T INTERVAL, ECG07: 322 MS
QRS DURATION, ECG06: 80 MS
QTC CALCULATION (BEZET), ECG08: 433 MS
VENTRICULAR RATE, ECG03: 109 BPM

## 2023-05-04 ENCOUNTER — OFFICE VISIT (OUTPATIENT)
Dept: URGENT CARE | Age: 28
End: 2023-05-04

## 2023-05-04 VITALS
BODY MASS INDEX: 41.78 KG/M2 | HEART RATE: 75 BPM | DIASTOLIC BLOOD PRESSURE: 84 MMHG | SYSTOLIC BLOOD PRESSURE: 119 MMHG | HEIGHT: 61 IN | OXYGEN SATURATION: 99 % | TEMPERATURE: 97.6 F | WEIGHT: 221.3 LBS

## 2023-05-04 DIAGNOSIS — H93.8X3 PRESSURE SENSATION IN BOTH EARS: ICD-10-CM

## 2023-05-04 DIAGNOSIS — R09.82 POST-NASAL DRIP: ICD-10-CM

## 2023-05-04 DIAGNOSIS — J02.9 SORE THROAT: Primary | ICD-10-CM

## 2023-05-04 LAB
S PYO AG THROAT QL: NEGATIVE
VALID INTERNAL CONTROL?: YES

## 2023-05-04 RX ORDER — CETIRIZINE HYDROCHLORIDE 10 MG/1
10 TABLET ORAL DAILY
Qty: 30 TABLET | Refills: 0 | Status: SHIPPED | OUTPATIENT
Start: 2023-05-04

## 2023-05-21 RX ORDER — CETIRIZINE HYDROCHLORIDE 10 MG/1
10 TABLET ORAL DAILY
COMMUNITY
Start: 2023-05-04

## 2023-05-21 RX ORDER — ACETAMINOPHEN 325 MG/1
650 TABLET ORAL EVERY 4 HOURS PRN
COMMUNITY
Start: 2023-02-09

## 2023-05-21 RX ORDER — HYDROCORTISONE ACETATE 25 MG/1
25 SUPPOSITORY RECTAL EVERY 12 HOURS
COMMUNITY
Start: 2022-03-11

## 2023-06-28 ENCOUNTER — OFFICE VISIT (OUTPATIENT)
Age: 28
End: 2023-06-28

## 2023-06-28 VITALS — WEIGHT: 219 LBS | BODY MASS INDEX: 41.38 KG/M2

## 2023-06-28 DIAGNOSIS — Z72.51 UNPROTECTED SEXUAL INTERCOURSE: Primary | ICD-10-CM

## 2023-06-28 LAB
HCG, PREGNANCY, URINE, POC: NEGATIVE
VALID INTERNAL CONTROL, POC: PRESENT

## 2024-01-03 ENCOUNTER — TELEMEDICINE (OUTPATIENT)
Age: 29
End: 2024-01-03
Payer: COMMERCIAL

## 2024-01-03 DIAGNOSIS — Z76.89 ENCOUNTER TO ESTABLISH CARE: Primary | ICD-10-CM

## 2024-01-03 DIAGNOSIS — E55.9 VITAMIN D DEFICIENCY: ICD-10-CM

## 2024-01-03 DIAGNOSIS — R73.09 ELEVATED GLUCOSE: ICD-10-CM

## 2024-01-03 DIAGNOSIS — E28.2 POLYCYSTIC OVARIES: ICD-10-CM

## 2024-01-03 DIAGNOSIS — Z76.89 ENCOUNTER TO ESTABLISH CARE: ICD-10-CM

## 2024-01-03 PROCEDURE — 99423 OL DIG E/M SVC 21+ MIN: CPT | Performed by: CLINICAL NURSE SPECIALIST

## 2024-01-03 SDOH — HEALTH STABILITY: PHYSICAL HEALTH: ON AVERAGE, HOW MANY MINUTES DO YOU ENGAGE IN EXERCISE AT THIS LEVEL?: 60 MIN

## 2024-01-03 SDOH — ECONOMIC STABILITY: HOUSING INSECURITY
IN THE LAST 12 MONTHS, WAS THERE A TIME WHEN YOU DID NOT HAVE A STEADY PLACE TO SLEEP OR SLEPT IN A SHELTER (INCLUDING NOW)?: NO

## 2024-01-03 SDOH — ECONOMIC STABILITY: FOOD INSECURITY: WITHIN THE PAST 12 MONTHS, YOU WORRIED THAT YOUR FOOD WOULD RUN OUT BEFORE YOU GOT MONEY TO BUY MORE.: NEVER TRUE

## 2024-01-03 SDOH — HEALTH STABILITY: PHYSICAL HEALTH: ON AVERAGE, HOW MANY DAYS PER WEEK DO YOU ENGAGE IN MODERATE TO STRENUOUS EXERCISE (LIKE A BRISK WALK)?: 3 DAYS

## 2024-01-03 SDOH — ECONOMIC STABILITY: FOOD INSECURITY: WITHIN THE PAST 12 MONTHS, THE FOOD YOU BOUGHT JUST DIDN'T LAST AND YOU DIDN'T HAVE MONEY TO GET MORE.: NEVER TRUE

## 2024-01-03 SDOH — ECONOMIC STABILITY: INCOME INSECURITY: HOW HARD IS IT FOR YOU TO PAY FOR THE VERY BASICS LIKE FOOD, HOUSING, MEDICAL CARE, AND HEATING?: NOT HARD AT ALL

## 2024-01-03 ASSESSMENT — PATIENT HEALTH QUESTIONNAIRE - PHQ9
6. FEELING BAD ABOUT YOURSELF - OR THAT YOU ARE A FAILURE OR HAVE LET YOURSELF OR YOUR FAMILY DOWN: 0
4. FEELING TIRED OR HAVING LITTLE ENERGY: 2
SUM OF ALL RESPONSES TO PHQ QUESTIONS 1-9: 6
8. MOVING OR SPEAKING SO SLOWLY THAT OTHER PEOPLE COULD HAVE NOTICED. OR THE OPPOSITE, BEING SO FIGETY OR RESTLESS THAT YOU HAVE BEEN MOVING AROUND A LOT MORE THAN USUAL: 0
2. FEELING DOWN, DEPRESSED OR HOPELESS: 0
9. THOUGHTS THAT YOU WOULD BE BETTER OFF DEAD, OR OF HURTING YOURSELF: 0
SUM OF ALL RESPONSES TO PHQ QUESTIONS 1-9: 6
SUM OF ALL RESPONSES TO PHQ QUESTIONS 1-9: 6
1. LITTLE INTEREST OR PLEASURE IN DOING THINGS: 0
7. TROUBLE CONCENTRATING ON THINGS, SUCH AS READING THE NEWSPAPER OR WATCHING TELEVISION: 2
3. TROUBLE FALLING OR STAYING ASLEEP: 2
SUM OF ALL RESPONSES TO PHQ QUESTIONS 1-9: 6
5. POOR APPETITE OR OVEREATING: 0
SUM OF ALL RESPONSES TO PHQ9 QUESTIONS 1 & 2: 0
10. IF YOU CHECKED OFF ANY PROBLEMS, HOW DIFFICULT HAVE THESE PROBLEMS MADE IT FOR YOU TO DO YOUR WORK, TAKE CARE OF THINGS AT HOME, OR GET ALONG WITH OTHER PEOPLE: 0

## 2024-01-03 ASSESSMENT — ENCOUNTER SYMPTOMS
RESPIRATORY NEGATIVE: 1
ABDOMINAL PAIN: 1

## 2024-01-03 NOTE — PROGRESS NOTES
Lucia Gregory, was evaluated through a synchronous (real-time) audio-video encounter. The patient (or guardian if applicable) is aware that this is a billable service, which includes applicable co-pays. This Virtual Visit was conducted with patient's (and/or legal guardian's) consent. Patient identification was verified, and a caregiver was present when appropriate.   The patient was located at Home: [address] [place] [address]  Provider was located at Home (Appt Dept State): GRAY Gregory (:  1995) is a New patient, presenting virtually for evaluation of the following:    Assessment & Plan   Below is the assessment and plan developed based on review of pertinent history, physical exam, labs, studies, and medications.  1. Encounter to establish care  -     CBC with Auto Differential; Future  -     Comprehensive Metabolic Panel; Future  -     Lipid Panel; Future  2. Vitamin D deficiency  -     Vitamin D 25 Hydroxy; Future  3. Elevated glucose  -     Hemoglobin A1C; Future  4. Polycystic ovaries    Baseline labs this visit.   High suspicion for acid reflux.   Discussed lifestyle modifications for management.   She will trial TLCs x 2 weeks then provide clinical update.   If no improvement, may trial PPI as well as initiate GI referral.  Discussed interventions for weight loss including calorie counting.   Also encouraged to increase daily water intake to at least 70 mLs.   Given FH of thyroid cancer, ozempic contraindicated but may consider phentermine in future.   Will follow up and advise on any additional POC pending test results.   Encouraged to call with any questions or concerns in the interim.         Return in about 3 months (around 4/3/2024) for Follow up on weight .       Reginaldo Myers presents for a virtual visit to establish care. States that she is generally doing well. PMH/PSH/Famhx as documented. Admits that she has not had a PCP since the pandemic so she wanted to get

## 2024-01-14 ENCOUNTER — OFFICE VISIT (OUTPATIENT)
Age: 29
End: 2024-01-14

## 2024-01-14 VITALS
BODY MASS INDEX: 42.38 KG/M2 | OXYGEN SATURATION: 99 % | WEIGHT: 224.3 LBS | SYSTOLIC BLOOD PRESSURE: 121 MMHG | HEART RATE: 71 BPM | DIASTOLIC BLOOD PRESSURE: 87 MMHG

## 2024-01-14 DIAGNOSIS — L81.8 HISTORY OF TATTOO: ICD-10-CM

## 2024-01-14 DIAGNOSIS — N39.0 ACUTE UTI: Primary | ICD-10-CM

## 2024-01-14 LAB
BILIRUBIN, URINE, POC: NEGATIVE
BLOOD URINE, POC: NEGATIVE
GLUCOSE URINE, POC: NEGATIVE
KETONES, URINE, POC: NEGATIVE
LEUKOCYTE ESTERASE, URINE, POC: ABNORMAL
NITRITE, URINE, POC: NEGATIVE
PH, URINE, POC: 6.5 (ref 4.6–8)
PROTEIN,URINE, POC: NEGATIVE
SPECIFIC GRAVITY, URINE, POC: 1.01 (ref 1–1.03)
URINALYSIS CLARITY, POC: ABNORMAL
URINALYSIS COLOR, POC: YELLOW
UROBILINOGEN, POC: ABNORMAL

## 2024-01-14 RX ORDER — NITROFURANTOIN 25; 75 MG/1; MG/1
100 CAPSULE ORAL 2 TIMES DAILY
Qty: 10 CAPSULE | Refills: 0 | Status: SHIPPED | OUTPATIENT
Start: 2024-01-14 | End: 2024-01-19

## 2024-01-14 RX ORDER — PHENAZOPYRIDINE HYDROCHLORIDE 100 MG/1
100 TABLET, FILM COATED ORAL 3 TIMES DAILY PRN
Qty: 6 TABLET | Refills: 0 | Status: SHIPPED | OUTPATIENT
Start: 2024-01-14 | End: 2024-01-16

## 2024-03-14 LAB
BASOPHILS # BLD AUTO: 0 X10E3/UL (ref 0–0.2)
BASOPHILS NFR BLD AUTO: 0 %
EOSINOPHIL # BLD AUTO: 0.2 X10E3/UL (ref 0–0.4)
EOSINOPHIL NFR BLD AUTO: 2 %
ERYTHROCYTE [DISTWIDTH] IN BLOOD BY AUTOMATED COUNT: 13.2 % (ref 11.7–15.4)
HBA1C MFR BLD: 5.4 % (ref 4.8–5.6)
HCT VFR BLD AUTO: 42 % (ref 34–46.6)
HGB BLD-MCNC: 13.5 G/DL (ref 11.1–15.9)
IMM GRANULOCYTES # BLD AUTO: 0 X10E3/UL (ref 0–0.1)
IMM GRANULOCYTES NFR BLD AUTO: 0 %
LYMPHOCYTES # BLD AUTO: 3.5 X10E3/UL (ref 0.7–3.1)
LYMPHOCYTES NFR BLD AUTO: 38 %
MCH RBC QN AUTO: 27.1 PG (ref 26.6–33)
MCHC RBC AUTO-ENTMCNC: 32.1 G/DL (ref 31.5–35.7)
MCV RBC AUTO: 84 FL (ref 79–97)
MONOCYTES # BLD AUTO: 0.7 X10E3/UL (ref 0.1–0.9)
MONOCYTES NFR BLD AUTO: 8 %
NEUTROPHILS # BLD AUTO: 4.8 X10E3/UL (ref 1.4–7)
NEUTROPHILS NFR BLD AUTO: 52 %
PLATELET # BLD AUTO: 255 X10E3/UL (ref 150–450)
RBC # BLD AUTO: 4.98 X10E6/UL (ref 3.77–5.28)
WBC # BLD AUTO: 9.2 X10E3/UL (ref 3.4–10.8)

## 2024-03-15 LAB
25(OH)D3+25(OH)D2 SERPL-MCNC: 39.6 NG/ML (ref 30–100)
ALBUMIN SERPL-MCNC: 4.4 G/DL (ref 4–5)
ALBUMIN/GLOB SERPL: 1.6 {RATIO} (ref 1.2–2.2)
ALP SERPL-CCNC: 76 IU/L (ref 44–121)
ALT SERPL-CCNC: 32 IU/L (ref 0–32)
AST SERPL-CCNC: 37 IU/L (ref 0–40)
BILIRUB SERPL-MCNC: 0.5 MG/DL (ref 0–1.2)
BUN SERPL-MCNC: 17 MG/DL (ref 6–20)
BUN/CREAT SERPL: 21 (ref 9–23)
CALCIUM SERPL-MCNC: 9.1 MG/DL (ref 8.7–10.2)
CHLORIDE SERPL-SCNC: 104 MMOL/L (ref 96–106)
CHOLEST SERPL-MCNC: 116 MG/DL (ref 100–199)
CO2 SERPL-SCNC: 22 MMOL/L (ref 20–29)
CREAT SERPL-MCNC: 0.8 MG/DL (ref 0.57–1)
EGFRCR SERPLBLD CKD-EPI 2021: 102 ML/MIN/1.73
GLOBULIN SER CALC-MCNC: 2.8 G/DL (ref 1.5–4.5)
GLUCOSE SERPL-MCNC: 86 MG/DL (ref 70–99)
HDLC SERPL-MCNC: 44 MG/DL
IMP & REVIEW OF LAB RESULTS: NORMAL
LDLC SERPL CALC-MCNC: 60 MG/DL (ref 0–99)
POTASSIUM SERPL-SCNC: 4.4 MMOL/L (ref 3.5–5.2)
PROT SERPL-MCNC: 7.2 G/DL (ref 6–8.5)
SODIUM SERPL-SCNC: 139 MMOL/L (ref 134–144)
TRIGL SERPL-MCNC: 53 MG/DL (ref 0–149)
VLDLC SERPL CALC-MCNC: 12 MG/DL (ref 5–40)

## 2024-05-15 ENCOUNTER — PATIENT MESSAGE (OUTPATIENT)
Age: 29
End: 2024-05-15

## 2024-05-15 DIAGNOSIS — D64.9 ANEMIA, UNSPECIFIED TYPE: ICD-10-CM

## 2024-05-15 DIAGNOSIS — E66.3 OVERWEIGHT: Primary | ICD-10-CM

## 2024-05-15 DIAGNOSIS — R73.09 ELEVATED GLUCOSE: ICD-10-CM

## 2024-05-15 DIAGNOSIS — R73.09 ELEVATED GLUCOSE: Primary | ICD-10-CM

## 2024-05-16 ENCOUNTER — TELEMEDICINE (OUTPATIENT)
Age: 29
End: 2024-05-16

## 2024-05-16 DIAGNOSIS — R03.0 ELEVATED BLOOD PRESSURE READING WITHOUT DIAGNOSIS OF HYPERTENSION: ICD-10-CM

## 2024-05-16 DIAGNOSIS — E66.01 CLASS 3 SEVERE OBESITY WITHOUT SERIOUS COMORBIDITY WITH BODY MASS INDEX (BMI) OF 40.0 TO 44.9 IN ADULT, UNSPECIFIED OBESITY TYPE (HCC): Primary | ICD-10-CM

## 2024-05-16 DIAGNOSIS — E28.2 POLYCYSTIC OVARIES: ICD-10-CM

## 2024-05-16 RX ORDER — BLOOD PRESSURE TEST KIT
KIT MISCELLANEOUS
Qty: 1 KIT | Refills: 0 | Status: SHIPPED | OUTPATIENT
Start: 2024-05-16

## 2024-05-16 RX ORDER — METFORMIN HYDROCHLORIDE 500 MG/1
500 TABLET, EXTENDED RELEASE ORAL
Qty: 90 TABLET | Refills: 1 | Status: SHIPPED | OUTPATIENT
Start: 2024-05-16

## 2024-05-16 ASSESSMENT — ENCOUNTER SYMPTOMS
SHORTNESS OF BREATH: 0
ABDOMINAL PAIN: 0

## 2024-05-16 NOTE — TELEPHONE ENCOUNTER
Lynda, Processor 5/16/2024 10:36 AM EDT    Is there a place she recommends? Do I need a referral number or do I just call it?     Happy to call today and get an appointment as soon as I can.

## 2024-05-16 NOTE — PROGRESS NOTES
Lucia Gregory (:  1995) is a 28 y.o. female,Established patient, here for evaluation of the following chief complaint(s):  Anxiety, Polycystic Ovarian Syndrome, Weight Loss, and Discuss Medications      Assessment & Plan   1. Encounter to establish care  2. Overweight  3. Elevated blood pressure reading without diagnosis of hypertension  -     Blood Pressure KIT; Disp-1 kit, R-0, NormalMonitor BP once daily  4. Polycystic ovaries    Refill of metformin given. Discussed phentermine and potential side effect of elevating BP. She has had a few elevated readings historically.  Advised to start monitoring her blood pressure daily for a couple of weeks to trend. Encouraged to commend with efforts at healthy and active lifestyle.   If BP stable, will trial phentermine. Referral for weight management clinic initiated, encouraged to establish care to discuss further options.   Encouraged to call with any questions or concerns. She will provide update via Fwd: Power in a few weeks.     No follow-ups on file.       Subjective   Lucia presents for a follow up visit. States that she is generally doing alright. Admits that she is frustrated as she has been working on weight loss for years consistently, but not seeing any results.   She is working out at the gym daily and being mindful of her diet. Recently signed up for a diet and exercise program that is offered by one of her former classmates, looking forward to this.   We have previously discussed GLP-1, mother does have a history of papillary cancer but there is no personal nor family history of medullary cancer. Admits that she is open to trying phentermine as she has been researching this and has noted that many people have good results with it. 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 She is looking forward to this.     Review of Systems   Constitutional:  Negative for fatigue.   Respiratory:  Negative for

## 2024-05-16 NOTE — PROGRESS NOTES
Chief Complaint   Patient presents with    Anxiety    Polycystic Ovarian Syndrome    Weight Loss    Discuss Medications     \"Have you been to the ER, urgent care clinic since your last visit?  Hospitalized since your last visit?\"    NO    “Have you seen or consulted any other health care providers outside of [facility] since your last visit?”    NO            Click Here for Release of Records Request

## 2024-05-17 ENCOUNTER — TELEPHONE (OUTPATIENT)
Age: 29
End: 2024-05-17

## 2024-05-17 DIAGNOSIS — E66.01 CLASS 3 SEVERE OBESITY WITHOUT SERIOUS COMORBIDITY WITH BODY MASS INDEX (BMI) OF 40.0 TO 44.9 IN ADULT, UNSPECIFIED OBESITY TYPE (HCC): Primary | ICD-10-CM

## 2024-05-17 RX ORDER — DULAGLUTIDE 0.75 MG/.5ML
INJECTION, SOLUTION SUBCUTANEOUS
Qty: 6 ML | Refills: 0 | Status: SHIPPED | OUTPATIENT
Start: 2024-05-17 | End: 2024-05-18 | Stop reason: SDUPTHER

## 2024-05-18 DIAGNOSIS — E66.01 CLASS 3 SEVERE OBESITY WITHOUT SERIOUS COMORBIDITY WITH BODY MASS INDEX (BMI) OF 40.0 TO 44.9 IN ADULT, UNSPECIFIED OBESITY TYPE (HCC): ICD-10-CM

## 2024-05-18 RX ORDER — DULAGLUTIDE 0.75 MG/.5ML
INJECTION, SOLUTION SUBCUTANEOUS
Qty: 6 ML | Refills: 0 | Status: SHIPPED | OUTPATIENT
Start: 2024-05-18 | End: 2024-07-17

## 2024-05-20 DIAGNOSIS — E66.01 CLASS 3 SEVERE OBESITY WITHOUT SERIOUS COMORBIDITY WITH BODY MASS INDEX (BMI) OF 40.0 TO 44.9 IN ADULT, UNSPECIFIED OBESITY TYPE (HCC): ICD-10-CM

## 2024-05-20 RX ORDER — DULAGLUTIDE 0.75 MG/.5ML
INJECTION, SOLUTION SUBCUTANEOUS
Qty: 6 ML | Refills: 0 | Status: SHIPPED | OUTPATIENT
Start: 2024-05-20 | End: 2024-07-19

## 2024-05-20 NOTE — TELEPHONE ENCOUNTER
From: Lucia Gregory  To: Office of Yuli Ryan  Sent: 5/18/2024 1:19 PM EDT  Subject: Medication Renewal Request    Refills have been requested for the following medications:     dulaglutide (TRULICITY) 0.75 MG/0.5ML SOPN SC injection [Yuli Ryan, APRN - CNP]   Patient Comment: Please send here instead. Their fax machine is broken/having issues. Kids cant wait at [facility] anymore    Preferred pharmacy: [facility] [address] [place] [address] - P [phone] - F [phone]

## 2024-05-28 ENCOUNTER — TELEMEDICINE (OUTPATIENT)
Age: 29
End: 2024-05-28
Payer: COMMERCIAL

## 2024-05-28 DIAGNOSIS — G47.26 SHIFTING SLEEP-WORK SCHEDULE, AFFECTING SLEEP: Primary | ICD-10-CM

## 2024-05-28 DIAGNOSIS — Z34.90 PREGNANCY, UNSPECIFIED GESTATIONAL AGE: ICD-10-CM

## 2024-05-28 DIAGNOSIS — E66.01 CLASS 3 SEVERE OBESITY WITHOUT SERIOUS COMORBIDITY WITH BODY MASS INDEX (BMI) OF 40.0 TO 44.9 IN ADULT, UNSPECIFIED OBESITY TYPE (HCC): ICD-10-CM

## 2024-05-28 DIAGNOSIS — F41.9 ANXIETY: ICD-10-CM

## 2024-05-28 PROCEDURE — 99213 OFFICE O/P EST LOW 20 MIN: CPT | Performed by: CLINICAL NURSE SPECIALIST

## 2024-05-28 ASSESSMENT — ENCOUNTER SYMPTOMS: SHORTNESS OF BREATH: 0

## 2024-05-28 NOTE — PROGRESS NOTES
Lucia Gregory (:  1995) is a 28 y.o. female,Established patient, here for evaluation of the following chief complaint(s):  discuss work concerns      Assessment & Plan   1. Shifting sleep-work schedule, affecting sleep  -     [facility] - Ruma Zavala, CNP, Sleep Medicine, [place] ([address])  2. Anxiety  3. Pregnancy, unspecified gestational age  4. Class 3 severe obesity without serious comorbidity with body mass index (BMI) of 40.0 to 44.9 in adult, unspecified obesity type (HCC)      Note given for work accommodation. Referral for sleep medicine as she would like definitive diagnosis.   Suspect that anxiety will resolve once she is no longer obligated to current role, she will advise if symptoms persist.   Reinforced need to avoid taking trulicity due to pregnancy, she will discuss metformin with OB.   Encouraged to call with questions or concerns.     Return if symptoms worsen or fail to improve.       Subjective   Lucia presents for a problem visit. Recently started a new position within the [employer] system, this has not been going well.  Job is stressful and she is only in training so anticipates that things will worsen. She has also had issues with sleep since starting the job.  She is working overnight and has trouble sleeping during the day or will sleep too much during the day and still wake up fatigued.  She is agitated and anxious, experiencing panic attacks especially when she is trying to fall asleep.   Thinks that she is likely going to resign, but would like to leave on good terms, requesting work note.   Recently learned that she was pregnant, had missed her cycle so she took a test. Will not start trulicity now.   Admits that this is bittersweet as she was looking forward to meeting her weight goals. Has a 2 year old and a 5 year old child already.  Appointment scheduled with OB on tomorrow to confirm pregnancy, reports that previously OB kept her on metformin during pregnancy.

## 2024-05-29 ENCOUNTER — NURSE ONLY (OUTPATIENT)
Age: 29
End: 2024-05-29

## 2024-05-29 DIAGNOSIS — N92.6 MISSED MENSES: Primary | ICD-10-CM

## 2024-05-29 LAB — HCG SERPL-ACNC: 9864 MIU/ML (ref 0–6)

## 2024-05-30 ENCOUNTER — TELEPHONE (OUTPATIENT)
Age: 29
End: 2024-05-30

## 2024-05-30 DIAGNOSIS — E66.3 OVERWEIGHT: Primary | ICD-10-CM

## 2024-05-30 DIAGNOSIS — E66.01 CLASS 3 SEVERE OBESITY DUE TO EXCESS CALORIES WITH SERIOUS COMORBIDITY AND BODY MASS INDEX (BMI) OF 40.0 TO 44.9 IN ADULT (HCC): ICD-10-CM

## 2024-05-30 NOTE — TELEPHONE ENCOUNTER
Yuli Ryan, APRN - CNP   to Me       5/30/24 11:30 AM  Medication previously discontinued, she is pregnant

## 2024-05-30 NOTE — TELEPHONE ENCOUNTER
Pharmacy of Lucia Gregory was called and verbalized understanding on note below.    Physical Exam





Vital Signs








  Date Time  Temp Pulse Resp B/P (MAP) Pulse Ox O2 Delivery O2 Flow Rate FiO2


 


4/9/18 09:42        60.0


 


4/9/18 09:09  75 14     


 


4/9/18 09:04     91 High-Flow Nasal Cannula 4.0 


 


4/9/18 06:30    140/70 (93)    


 


4/9/18 03:30 97.2       








Result Diagram:  


4/9/18 0500                                                                    

            4/9/18 0500








Assessment and Plan


Problems:  


(1) COPD with exacerbation


Status:  Acute


Assessment & Plan:  She does have a history of severe COPD and presented with 

increased shortness of breath.  Her Chest x-rays had been negative for an 

infiltrate but are now showing a RLL infiltrate.  She is on treatment with 

nebulizers, IV corticosteroids, and ceftriaxone. She remains afebrile and her 

WBC is normal. She did extubate herself this am and was initially doing fine on 

nasal cannula. She desaturated when up out of bed and is now on BiPAP but doing 

well. Will wean back to a nasal cannula and monitor.





(2) Right lower lobe pneumonia


Status:  Acute


Assessment & Plan:  CXR is now showing a right lower lobe infiltrate. She is on 

Rocephin and remains afebrile. WBC is normal. 





(3) Acute respiratory failure


Assessment & Plan:  She was intubated on 4/7/18 during a code.  She received 

versed and propofol for sedation.  She extubated herself on 04/09. She is 

currently on nasal cannula with BiPAP prn and is doing well.





(4) Cardiorespiratory arrest


Status:  Acute


Assessment & Plan:  She did suffer a pulseless arrest on 4/7/18.  This was 

likely secondary to SVT with rates over 200 by telemetry report.





(5) Supraventricular tachycardia


Status:  Acute


Assessment & Plan:  She was noted to be in a narrow complex tachycardia during 

her code event.  She converted to sinus rhythm after receiving adenosine.  She 

has been in NSR with short runs of SVT since. Last evening she was started on a 

Cardizem gtt for prolonged SVT. This am she had recurrent tachycardia due to 

getting out of bed to use the commode. Her Cardizem was increased briefly. She 

is now back to 5mg per hour.





(6) Hypertension


Status:  Chronic


Assessment & Plan:  She had been on diuretics, amlodipine and benazepril. These 

have all been on hold and her blood pressures are only mildly elevated. Will 

likely place her on Cardizem in place of amlodipine once she is ready to go 

back on orals to cover her for SVT as well.





(7) Anxiety


Status:  Chronic


Assessment & Plan:  She has been on chronic fluoxetine and alprazolam.





(8) Depression


Status:  Chronic


Assessment & Plan:  She has been on the fluoxetine.





(9) Hypothyroidism


Status:  Chronic


Assessment & Plan:  She is on chronic treatment with Synthroid. Will continue.





(10) CKD (chronic kidney disease), stage III


Status:  Chronic


(11) Elevated troponin


Assessment & Plan:  She has had an elevated troponin, but these have not 

trended in a pattern consistent with infarction.  It is likely secondary to her 

chronic kidney disease.





(12) Acute systolic right heart failure


Assessment & Plan:  She did have an elevated BNP.  Her echocardiogram is 

reported to show a preserved ejection fraction, but pulmonary hypertension.   

She received Lasix 04/08 but her creatinine increased.





Time Spent on Plan of Care:  < 30 min





Exam


Sepsis Risk:  No Definite Risk





Problem Qualifiers





(1) Hypertension:  


Hypertension type:  essential hypertension  Qualified Codes:  I10 - Essential (

primary) hypertension








GABBY RODRIGUEZ MD Apr 9, 2018 11:19

## 2024-06-03 ENCOUNTER — TELEPHONE (OUTPATIENT)
Facility: HOSPITAL | Age: 29
End: 2024-06-03

## 2024-06-03 ENCOUNTER — PATIENT MESSAGE (OUTPATIENT)
Age: 29
End: 2024-06-03

## 2024-06-06 ENCOUNTER — ROUTINE PRENATAL (OUTPATIENT)
Age: 29
End: 2024-06-06
Payer: COMMERCIAL

## 2024-06-06 ENCOUNTER — APPOINTMENT (OUTPATIENT)
Facility: HOSPITAL | Age: 29
End: 2024-06-06
Payer: COMMERCIAL

## 2024-06-06 ENCOUNTER — HOSPITAL ENCOUNTER (EMERGENCY)
Facility: HOSPITAL | Age: 29
Discharge: HOME | End: 2024-06-06
Attending: STUDENT IN AN ORGANIZED HEALTH CARE EDUCATION/TRAINING PROGRAM
Payer: COMMERCIAL

## 2024-06-06 VITALS
WEIGHT: 226.19 LBS | SYSTOLIC BLOOD PRESSURE: 112 MMHG | HEIGHT: 61 IN | DIASTOLIC BLOOD PRESSURE: 77 MMHG | OXYGEN SATURATION: 99 % | BODY MASS INDEX: 42.71 KG/M2 | RESPIRATION RATE: 20 BRPM | TEMPERATURE: 97.9 F | HEART RATE: 73 BPM

## 2024-06-06 DIAGNOSIS — N92.6 MISSED MENSES: Primary | ICD-10-CM

## 2024-06-06 DIAGNOSIS — O26.891 ABDOMINAL PAIN DURING PREGNANCY IN FIRST TRIMESTER: Primary | ICD-10-CM

## 2024-06-06 DIAGNOSIS — R10.9 ABDOMINAL PAIN DURING PREGNANCY IN FIRST TRIMESTER: Primary | ICD-10-CM

## 2024-06-06 LAB
ALBUMIN SERPL-MCNC: 3.7 G/DL (ref 3.5–5)
ALBUMIN/GLOB SERPL: 1 (ref 1.1–2.2)
ALP SERPL-CCNC: 58 U/L (ref 45–117)
ALT SERPL-CCNC: 27 U/L (ref 12–78)
BASOPHILS # BLD: 0.1 K/UL (ref 0–0.1)
BASOPHILS NFR BLD: 1 % (ref 0–1)
BILIRUB SERPL-MCNC: 0.6 MG/DL (ref 0.2–1)
BUN SERPL-MCNC: 8 MG/DL (ref 6–20)
BUN/CREAT SERPL: 11 (ref 12–20)
CALCIUM SERPL-MCNC: 9.2 MG/DL (ref 8.5–10.1)
CHLORIDE SERPL-SCNC: 105 MMOL/L (ref 97–108)
COMMENT:: NORMAL
CRL: 0.72 CM
CRL: 0.8 CM
CRL: 0.82 CM
CRL: 0.83 CM
CRL: 0.84 CM
DIFFERENTIAL METHOD BLD: ABNORMAL
EOSINOPHIL # BLD: 0.2 K/UL (ref 0–0.4)
ERYTHROCYTE [DISTWIDTH] IN BLOOD BY AUTOMATED COUNT: 13.6 % (ref 11.5–14.5)
GLOBULIN SER CALC-MCNC: 3.7 G/DL (ref 2–4)
GLUCOSE SERPL-MCNC: 94 MG/DL (ref 65–100)
HCG SERPL-ACNC: ABNORMAL MIU/ML (ref 0–6)
HCG SERPL-ACNC: ABNORMAL MIU/ML (ref 0–6)
HCT VFR BLD AUTO: 38.7 % (ref 35–47)
HGB BLD-MCNC: 13 G/DL (ref 11.5–16)
IMM GRANULOCYTES # BLD AUTO: 0 K/UL (ref 0–0.04)
IMM GRANULOCYTES NFR BLD AUTO: 0 % (ref 0–0.5)
LYMPHOCYTES # BLD: 2.2 K/UL (ref 0.8–3.5)
LYMPHOCYTES NFR BLD: 22 % (ref 12–49)
MAGNESIUM SERPL-MCNC: 2.2 MG/DL (ref 1.6–2.4)
MCH RBC QN AUTO: 28 PG (ref 26–34)
MCHC RBC AUTO-ENTMCNC: 33.6 G/DL (ref 30–36.5)
MCV RBC AUTO: 83.2 FL (ref 80–99)
MONOCYTES # BLD: 0.4 K/UL (ref 0–1)
NEUTS SEG # BLD: 7.4 K/UL (ref 1.8–8)
NEUTS SEG NFR BLD: 71 % (ref 32–75)
NRBC # BLD: 0 K/UL (ref 0–0.01)
NRBC BLD-RTO: 0 PER 100 WBC
PLATELET # BLD AUTO: 215 K/UL (ref 150–400)
PMV BLD AUTO: 10.8 FL (ref 8.9–12.9)
PROT SERPL-MCNC: 7.4 G/DL (ref 6.4–8.2)
RBC # BLD AUTO: 4.65 M/UL (ref 3.8–5.2)
SAC DIAMETER: 1.81 CM
SAC DIAMETER: 1.85 CM
SODIUM SERPL-SCNC: 138 MMOL/L (ref 136–145)
WBC # BLD AUTO: 10.3 K/UL (ref 3.6–11)

## 2024-06-06 PROCEDURE — 76801 OB US < 14 WKS SINGLE FETUS: CPT

## 2024-06-06 PROCEDURE — 85025 COMPLETE CBC W/AUTO DIFF WBC: CPT

## 2024-06-06 PROCEDURE — 6370000000 HC RX 637 (ALT 250 FOR IP)

## 2024-06-06 PROCEDURE — 76817 TRANSVAGINAL US OBSTETRIC: CPT

## 2024-06-06 PROCEDURE — 36415 COLL VENOUS BLD VENIPUNCTURE: CPT

## 2024-06-06 PROCEDURE — 99284 EMERGENCY DEPT VISIT MOD MDM: CPT

## 2024-06-06 PROCEDURE — 99212 OFFICE O/P EST SF 10 MIN: CPT | Performed by: MIDWIFE

## 2024-06-06 PROCEDURE — 80053 COMPREHEN METABOLIC PANEL: CPT

## 2024-06-06 PROCEDURE — 84702 CHORIONIC GONADOTROPIN TEST: CPT

## 2024-06-06 PROCEDURE — 83735 ASSAY OF MAGNESIUM: CPT

## 2024-06-06 RX ORDER — POTASSIUM CHLORIDE 750 MG/1
40 TABLET, FILM COATED, EXTENDED RELEASE ORAL ONCE
Status: COMPLETED | OUTPATIENT
Start: 2024-06-06 | End: 2024-06-06

## 2024-06-06 RX ADMIN — POTASSIUM CHLORIDE 40 MEQ: 750 TABLET, FILM COATED, EXTENDED RELEASE ORAL at 19:52

## 2024-06-06 ASSESSMENT — LIFESTYLE VARIABLES
HOW OFTEN DO YOU HAVE A DRINK CONTAINING ALCOHOL: NEVER
HOW MANY STANDARD DRINKS CONTAINING ALCOHOL DO YOU HAVE ON A TYPICAL DAY: PATIENT DOES NOT DRINK

## 2024-06-06 ASSESSMENT — PAIN DESCRIPTION - ORIENTATION: ORIENTATION: RIGHT;LOWER

## 2024-06-06 ASSESSMENT — PAIN SCALES - GENERAL: PAINLEVEL_OUTOF10: 5

## 2024-06-06 ASSESSMENT — ENCOUNTER SYMPTOMS
NAUSEA: 1
SHORTNESS OF BREATH: 0
ABDOMINAL PAIN: 1
VOMITING: 0

## 2024-06-06 ASSESSMENT — PAIN DESCRIPTION - PAIN TYPE: TYPE: ACUTE PAIN

## 2024-06-06 ASSESSMENT — PAIN DESCRIPTION - ONSET: ONSET: ON-GOING

## 2024-06-06 ASSESSMENT — PAIN DESCRIPTION - FREQUENCY: FREQUENCY: CONTINUOUS

## 2024-06-06 ASSESSMENT — PAIN - FUNCTIONAL ASSESSMENT
PAIN_FUNCTIONAL_ASSESSMENT: ACTIVITIES ARE NOT PREVENTED
PAIN_FUNCTIONAL_ASSESSMENT: 0-10

## 2024-06-06 ASSESSMENT — PAIN DESCRIPTION - LOCATION: LOCATION: ABDOMEN

## 2024-06-06 ASSESSMENT — PAIN DESCRIPTION - DESCRIPTORS: DESCRIPTORS: ACHING;DISCOMFORT

## 2024-06-06 NOTE — ED TRIAGE NOTES
Patient arrives to the ED with concerns for ectopic pregnancy. Patient has RLQ abdominal pain, denies bleeding. Patient is unsure of LMP or how many weeks pregnant she is. This is patient's third pregnancy.

## 2024-06-06 NOTE — PROGRESS NOTES
Chief Complaint   Patient presents with    Amenorrhea       Ob/Gyn Hx:    LMP - Unsure  Menses - 7d   Contraception - none.  Hx of STI - No    SA - Yes      Health Maintenance:  Last Pap:     1. Have you been to the ER, urgent care clinic, or hospitalized since your last visit?No    2. Have you seen or consulted any other health care providers outside of the [facility] since your last visit? No    Patient declines chaperone.    Shellie Chirinos, CHANELLN

## 2024-06-06 NOTE — PROGRESS NOTES
Problem visit    Lucia is here for a missed menses appointment with right sided abdominal pain.  She rates it a 7/10.    She is unsure of her LMP but is probably about 5 weeks pregnant.  Beta today.  US not available today in office.  Sent to [facility] ER for US to r/o ectopic pregnancy.  Physician on call notified

## 2024-06-06 NOTE — DISCHARGE INSTRUCTIONS
Take tylenol as needed for pains.     Follow-up with your GYN for reevaluation. Call for appointment as soon as possible.

## 2024-06-06 NOTE — ED PROVIDER NOTES
[facility] EMERGENCY DEP  EMERGENCY DEPARTMENT ENCOUNTER      Pt Name: Lucia Gregory  MRN: [id number]  Birthdate 1995  Date of evaluation: 6/6/2024  Provider: CHERYLE Keys NP    CHIEF COMPLAINT       Chief Complaint   Patient presents with    Abdominal Pain         HISTORY OF PRESENT ILLNESS   (Location/Symptom, Timing/Onset, Context/Setting, Quality, Duration, Modifying Factors, Severity)  Note limiting factors.   Lucia Gregory is a 28 y.o. female presenting to the ED for concerns for ectopic pregnancy. Pt c/o lower abdominal pains for unknown duration. Unknown how far along she is. LMP: unknown. + nausea.     Denies vaginal bleeding, vomiting.     Total pregnancy: 3; Living children: 2    Social hx: occasional smoking, rare etoh, admits to marijuana, denies illicit drug use.     The history is provided by the patient. No  was used.         Review of External Medical Records:     Nursing Notes were reviewed.    REVIEW OF SYSTEMS    (2-9 systems for level 4, 10 or more for level 5)     Review of Systems   Constitutional:  Negative for activity change, appetite change and fever.   Respiratory:  Negative for shortness of breath.    Cardiovascular:  Negative for chest pain.   Gastrointestinal:  Positive for abdominal pain and nausea. Negative for vomiting.   Genitourinary:  Negative for decreased urine volume, difficulty urinating, dysuria and vaginal bleeding.   Musculoskeletal:  Negative for myalgias.   Skin:  Negative for rash.   All other systems reviewed and are negative.      Except as noted above the remainder of the review of systems was reviewed and negative.       PAST MEDICAL HISTORY     Past Medical History:   Diagnosis Date    ADHD (attention deficit hyperactivity disorder)     Anemia 2/17/2022    Anxiety     Borderline personality disorder (HCC)     Degeneration of intervertebral disc of lumbar region 2/17/2022    Depression     History of removal of left breast

## 2024-06-07 ENCOUNTER — TELEPHONE (OUTPATIENT)
Age: 29
End: 2024-06-07

## 2024-06-07 NOTE — TELEPHONE ENCOUNTER
Patient called in with concerns about her visit with Jacqui yesterday. She notes she was having concerns about a possible ectopic pregnancy. She notes that she explained to Jacqui that she was having one sided pelvic pain. She notes she is disappointed in Jacqui for not calling her and discussing things with her prior to the appointment being scheduled. She notes she felt she should have been scheduled with an ultrasound prior to her coming into an appointment yesterday. She is unhappy that she had to go to the ER yesterday for her ultrasound as she had to spend 5 hours there after her visit here with Jacqui. She notes she does not want to see Jacqui again.     She notes she is still having pain. She is aware she can take Tylenol for her discomforts. She is aware that Shalom is the provider on call over the weekend and to call in with any additional concerns.

## 2024-06-07 NOTE — TELEPHONE ENCOUNTER
From: CHERYLE Gillespie CNM  To: Lucia Gregory  Sent: 6/3/2024 11:42 AM EDT  Subject: Checking In    Skyler Myers,     Just checking in again. I just tried giving you a call. We keep missing each other! I wanted to check in and make sure you were okay. I was wondering how many weeks you think you may be. It's okay if you don't know! I just want to ensure we see you in a timely manner.     CHERYLE Gillespie CNM

## 2024-06-11 RX ORDER — ONDANSETRON 4 MG/1
4 TABLET, ORALLY DISINTEGRATING ORAL 3 TIMES DAILY PRN
Qty: 21 TABLET | Refills: 0 | Status: SHIPPED | OUTPATIENT
Start: 2024-06-11

## 2024-06-11 RX ORDER — SERTRALINE HYDROCHLORIDE 25 MG/1
25 TABLET, FILM COATED ORAL DAILY
Qty: 30 TABLET | Refills: 3 | Status: SHIPPED | OUTPATIENT
Start: 2024-06-11

## 2024-06-11 NOTE — PROGRESS NOTES
I called pt to review her concerns. She was scheduled for a NOB visit 6/13-    She had an US last week at 6 weeks 5 days that identified a SIUP pregnancy. I recommend changing apt to 8 weeks for a follow up scan. Pt is in agreement. New apt scheduled for 6/24.    Pt wanted to discuss anxiety and nausea at her visit on 6/13- we discussed anxiety and nausea via phone. She would like to try medication for both.   Zoloft 25 mg daily and 4 mg zofran ODT sent in with education.    Follow up in 2 weeks    CHERYLE BECKFORD - LEONILA

## 2024-06-24 ENCOUNTER — ROUTINE PRENATAL (OUTPATIENT)
Age: 29
End: 2024-06-24

## 2024-06-24 VITALS
DIASTOLIC BLOOD PRESSURE: 70 MMHG | SYSTOLIC BLOOD PRESSURE: 132 MMHG | HEIGHT: 61 IN | BODY MASS INDEX: 42.56 KG/M2 | WEIGHT: 225.4 LBS

## 2024-06-24 DIAGNOSIS — Z3A.09 9 WEEKS GESTATION OF PREGNANCY: ICD-10-CM

## 2024-06-24 DIAGNOSIS — Z3A.10 10 WEEKS GESTATION OF PREGNANCY: Primary | ICD-10-CM

## 2024-06-24 PROCEDURE — 0502F SUBSEQUENT PRENATAL CARE: CPT | Performed by: ADVANCED PRACTICE MIDWIFE

## 2024-06-24 NOTE — PROGRESS NOTES
Initial Prenatal Note    CC: Amenorrhea, positive pregnancy test    HPI:  Lucia Gregory is a 28 y.o.  who presents for initial prenatal appointment. Since her LMP she has experienced mild nausea. She denies dysuria, discharge, vaginal bleeding.    LMP history:  The date of her LMP is unknown.       Ultrasound data:  TA ULTRASOUND PERFORMED A SINGLE VIABLE 9W4D WITH GEOVANNA OF 2025 IUP IS SEEN WITH NORMAL CARDIAC RHYTHM. GESTATIONAL AGE BASED ON TODAY'S ULTRASOUND. A NORMAL YOLK SAC IS SEEN. RIGHT OVARY APPEARS WNL. LEFT OVARY APPEARS WNL. NO FREE FLUID IS SEEN IN THE CDS.     She is dated by [facility].    Relevant past pregnancy history:  She has the following pregnancy history:  Vaginal post dates x2   She does not history of  delivery.    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   Her partner's name is Cuong Weiss.    1. Have you been to the ER, urgent care clinic, or hospitalized since your last visit?No    2. Have you seen or consulted any other health care providers outside of the [facility] since your last visit? No    She declines  a chaperone during the gynecologic exam today.      Shellie Chirinos LPN

## 2024-07-22 ENCOUNTER — TELEPHONE (OUTPATIENT)
Age: 29
End: 2024-07-22

## 2024-07-22 NOTE — TELEPHONE ENCOUNTER
Patient called, name and  verified. Patient is calling c/o having terrible. Cramps. She recently started having terrible pregnancy nightmares and dreamt that she miscarried. She is now very worried. She has an appt scheduled for tomorrow afternoon at about 3:00 but wanted to know if she could be seen first thing in the morning?    She does not have any hx of SAB and denies any bleeding but saw mucousy discharge.    She also reports having PCOS.    Reassurance provided and bleeding/pain precautions given. Okay to move her to the morning? Please let me know what messages to relay to the pt.     and 13w4d. Age is 28.

## 2024-07-23 ENCOUNTER — ROUTINE PRENATAL (OUTPATIENT)
Age: 29
End: 2024-07-23

## 2024-07-23 VITALS — DIASTOLIC BLOOD PRESSURE: 80 MMHG | SYSTOLIC BLOOD PRESSURE: 130 MMHG

## 2024-07-23 DIAGNOSIS — Z34.83 PRENATAL CARE, SUBSEQUENT PREGNANCY IN THIRD TRIMESTER: Primary | ICD-10-CM

## 2024-07-23 NOTE — PROGRESS NOTES
TA ULTRASOUND PERFORMED A SINGLE VIABLE 13W5D IUP IS SEEN WITH NORMAL CARDIAC RHYTHM. GESTATIONAL AGE BASED ON PREVIOUS ULTRASOUND. ANTERIOR PLACENTA IS SEEN. RIGHT OVARY APPEARS WITHIN NORMAL LIMITS. LEFT OVARY OBSCURED BY BOWEL GAS. NO FREE FLUID IS SEEN IN THE CDS.

## 2024-07-24 NOTE — PROGRESS NOTES
TA ULTRASOUND PERFORMED A SINGLE VIABLE 13W5D IUP IS SEEN WITH NORMAL CARDIAC RHYTHM. GESTATIONAL AGE BASED ON PREVIOUS ULTRASOUND. ANTERIOR PLACENTA IS SEEN. RIGHT OVARY APPEARS WITHIN NORMAL LIMITS. LEFT OVARY OBSCURED BY BOWEL GAS. NO FREE FLUID IS SEEN IN THE CDS.       Pt having nightmares and cramping and very anxious that she will miscarry.  US looks great today reassured patient.   SUSAN 4 weeks

## 2024-08-08 ENCOUNTER — TELEPHONE (OUTPATIENT)
Age: 29
End: 2024-08-08

## 2024-08-08 NOTE — TELEPHONE ENCOUNTER
Call #2- Contacted pt regarding [facility] referral via email. The patient has registered for the [facility] orientation, but has not completed the video. I resent the link and directions on what to do to continue to the next steps.    No

## 2024-08-27 RX ORDER — METFORMIN HCL 500 MG
500 TABLET, EXTENDED RELEASE 24 HR ORAL
Qty: 90 TABLET | Refills: 0 | OUTPATIENT
Start: 2024-08-27

## 2024-09-06 ENCOUNTER — OFFICE VISIT (OUTPATIENT)
Age: 29
End: 2024-09-06

## 2024-09-06 ENCOUNTER — ROUTINE PRENATAL (OUTPATIENT)
Age: 29
End: 2024-09-06

## 2024-09-06 ENCOUNTER — TELEPHONE (OUTPATIENT)
Age: 29
End: 2024-09-06

## 2024-09-06 VITALS
SYSTOLIC BLOOD PRESSURE: 100 MMHG | OXYGEN SATURATION: 97 % | BODY MASS INDEX: 41.26 KG/M2 | WEIGHT: 224.2 LBS | HEIGHT: 62 IN | TEMPERATURE: 98.3 F | DIASTOLIC BLOOD PRESSURE: 67 MMHG | HEART RATE: 85 BPM | RESPIRATION RATE: 20 BRPM

## 2024-09-06 VITALS — DIASTOLIC BLOOD PRESSURE: 67 MMHG | WEIGHT: 224.2 LBS | BODY MASS INDEX: 41.68 KG/M2 | SYSTOLIC BLOOD PRESSURE: 100 MMHG

## 2024-09-06 DIAGNOSIS — Z3A.09 9 WEEKS GESTATION OF PREGNANCY: Primary | ICD-10-CM

## 2024-09-06 DIAGNOSIS — O99.210 OBESITY IN PREGNANCY: Primary | ICD-10-CM

## 2024-09-06 DIAGNOSIS — Z34.90 PREGNANCY, UNSPECIFIED GESTATIONAL AGE: ICD-10-CM

## 2024-09-06 LAB
ABO + RH BLD: NORMAL
BLOOD BANK CMNT PATIENT-IMP: NORMAL
BLOOD GROUP ANTIBODIES SERPL: NORMAL
SPECIMEN EXP DATE BLD: NORMAL

## 2024-09-06 PROCEDURE — 0502F SUBSEQUENT PRENATAL CARE: CPT | Performed by: ADVANCED PRACTICE MIDWIFE

## 2024-09-06 RX ORDER — METFORMIN HCL 500 MG
500 TABLET, EXTENDED RELEASE 24 HR ORAL
Qty: 90 TABLET | Refills: 1 | Status: SHIPPED | OUTPATIENT
Start: 2024-09-06

## 2024-09-06 NOTE — TELEPHONE ENCOUNTER
Spoke w/ patient and confirmed name and - Scheduled patient for 24 @ 10:30 at [facility] [address] [address] [facility] [id number]    Advised patient to arrive 10/15 min early to fill out paperwork.     Informed her that she may bring two guest over the age of 12. We do not allow children in the office. She informed me that if something came up with childcare she would call and reschedule.

## 2024-09-06 NOTE — TELEPHONE ENCOUNTER
Received an phone call for pt to schedule an appt. I offered the pt 09/23 @ [facility] , 09/25 @ [facility]. Pt stated that she could not do the [facility] location. She declined both dates. I offered her 10/01 at 8:45 at [facility] and she stated that she would call her OB to see if that was too far out because  they wanted her seen ASAP. She called back and declined that appt. I Informed her that I did not want to push her out  too far out . The pt informed me that she drops her daughter off at 8:00 and pick her up at 1:00. I then informed the patient that I would try to work her in the schedule and give her call back to possibly get in her in sooner to accommodate her tight schedule.

## 2024-09-06 NOTE — TELEPHONE ENCOUNTER
Received an call from Ilsa at [facility] stating that the pt needed to seen for an anatomy scan and. I informed Ilsa that we had 09/23 at 1:45 available. Ilsa stated due to  the pt can only do this Monday or Tuesday. I informed Ilsa that I could call the patient. The pt called and she stated again that she could only do this Monday and Tuesday. I offered 09/23 (Monday) and she declined stating that she does not have  and there is no one that could watch her babies.

## 2024-09-06 NOTE — PROGRESS NOTES
Doing well over all   Needs FAS- calling MFM they are working on working her in on Monday and Tuesday afternoon to see if they can work her in    Taking metformin 500 mg     Seeing chiro for back pain- sees placido      SUSAN 4 weeks SUSAN 8 weeks

## 2024-09-06 NOTE — TELEPHONE ENCOUNTER
Called pt and confirmed name and - I informed the patient that I was working on getting her scheduled asap and would be contacting her back this afternoon. I asked her for her availablity and she told me that she could come between the hours of 9am-1pm, but  may cause an issue. I told her that if something came up she would be able to r/s. I will contact her again this afternoon before COB. Patient agreed.

## 2024-09-07 LAB
BACTERIA SPEC CULT: NORMAL
CC UR VC: NORMAL
ERYTHROCYTE [DISTWIDTH] IN BLOOD BY AUTOMATED COUNT: 13.5 % (ref 11.5–14.5)
HBV SURFACE AG SER QL: <0.1 INDEX
HBV SURFACE AG SER QL: NEGATIVE
HCT VFR BLD AUTO: 34.8 % (ref 35–47)
HCV AB SER IA-ACNC: 0.05 INDEX
HCV AB SERPL QL IA: NONREACTIVE
HGB BLD-MCNC: 11.5 G/DL (ref 11.5–16)
HIV 1+2 AB+HIV1 P24 AG SERPL QL IA: NONREACTIVE
HIV 1/2 RESULT COMMENT: NORMAL
MCH RBC QN AUTO: 27.5 PG (ref 26–34)
MCHC RBC AUTO-ENTMCNC: 33 G/DL (ref 30–36.5)
MCV RBC AUTO: 83.3 FL (ref 80–99)
NRBC # BLD: 0 K/UL (ref 0–0.01)
NRBC BLD-RTO: 0 PER 100 WBC
PLATELET # BLD AUTO: 232 K/UL (ref 150–400)
PMV BLD AUTO: 11.1 FL (ref 8.9–12.9)
RBC # BLD AUTO: 4.18 M/UL (ref 3.8–5.2)
RUBV IGG SERPL IA-ACNC: NORMAL IU/ML
SERVICE CMNT-IMP: NORMAL
WBC # BLD AUTO: 9.3 K/UL (ref 3.6–11)

## 2024-09-08 LAB — VZV IGG SER IA-ACNC: 339 INDEX

## 2024-09-09 LAB
C TRACH RRNA SPEC QL NAA+PROBE: NEGATIVE
N GONORRHOEA RRNA SPEC QL NAA+PROBE: NEGATIVE
SPECIMEN SOURCE: NORMAL
T PALLIDUM AB SER QL IA: NON REACTIVE
T VAGINALIS RRNA SPEC QL NAA+PROBE: NEGATIVE

## 2024-09-10 LAB
HGB A MFR BLD: 97.6 % (ref 96.4–98.8)
HGB A2 MFR BLD COLUMN CHROM: 2.4 % (ref 1.8–3.2)
HGB F MFR BLD: 0 % (ref 0–2)
HGB FRACT BLD-IMP: NORMAL
HGB S MFR BLD: 0 %

## 2024-09-12 ENCOUNTER — ROUTINE PRENATAL (OUTPATIENT)
Age: 29
End: 2024-09-12
Payer: COMMERCIAL

## 2024-09-12 VITALS — DIASTOLIC BLOOD PRESSURE: 69 MMHG | HEART RATE: 72 BPM | SYSTOLIC BLOOD PRESSURE: 128 MMHG

## 2024-09-12 DIAGNOSIS — O99.210 OBESITY AFFECTING PREGNANCY, ANTEPARTUM, UNSPECIFIED OBESITY TYPE: Primary | ICD-10-CM

## 2024-09-12 PROBLEM — Z3A.09 9 WEEKS GESTATION OF PREGNANCY: Status: RESOLVED | Noted: 2024-06-24 | Resolved: 2024-09-12

## 2024-09-12 PROBLEM — O48.0 41 WEEKS GESTATION OF PREGNANCY: Status: RESOLVED | Noted: 2022-03-07 | Resolved: 2024-09-12

## 2024-09-12 PROBLEM — Z3A.01 LESS THAN 8 WEEKS GESTATION OF PREGNANCY: Status: RESOLVED | Noted: 2021-07-13 | Resolved: 2024-09-12

## 2024-09-12 PROBLEM — Z3A.41 41 WEEKS GESTATION OF PREGNANCY: Status: RESOLVED | Noted: 2022-03-07 | Resolved: 2024-09-12

## 2024-09-12 PROCEDURE — 99203 OFFICE O/P NEW LOW 30 MIN: CPT | Performed by: OBSTETRICS & GYNECOLOGY

## 2024-09-12 PROCEDURE — 76811 OB US DETAILED SNGL FETUS: CPT | Performed by: OBSTETRICS & GYNECOLOGY

## 2024-09-20 PROBLEM — Z34.90 ENCOUNTER FOR ELECTIVE INDUCTION OF LABOR: Status: RESOLVED | Noted: 2022-03-07 | Resolved: 2024-09-20

## 2024-09-26 DIAGNOSIS — Z34.90 PREGNANCY, UNSPECIFIED GESTATIONAL AGE: Primary | ICD-10-CM

## 2024-10-04 ENCOUNTER — ROUTINE PRENATAL (OUTPATIENT)
Age: 29
End: 2024-10-04

## 2024-10-04 VITALS
DIASTOLIC BLOOD PRESSURE: 81 MMHG | WEIGHT: 229 LBS | SYSTOLIC BLOOD PRESSURE: 108 MMHG | BODY MASS INDEX: 42.57 KG/M2 | HEART RATE: 99 BPM

## 2024-10-04 DIAGNOSIS — Z34.90 PREGNANCY, UNSPECIFIED GESTATIONAL AGE: Primary | ICD-10-CM

## 2024-10-04 DIAGNOSIS — Z36.9 ENCOUNTER FOR ANTENATAL SCREENING: ICD-10-CM

## 2024-10-04 DIAGNOSIS — Z3A.09 9 WEEKS GESTATION OF PREGNANCY: ICD-10-CM

## 2024-10-04 PROCEDURE — 0502F SUBSEQUENT PRENATAL CARE: CPT | Performed by: ADVANCED PRACTICE MIDWIFE

## 2024-10-04 RX ORDER — HYDROCORTISONE ACETATE 25 MG/1
25 SUPPOSITORY RECTAL EVERY 12 HOURS
Qty: 3 SUPPOSITORY | Refills: 5 | Status: SHIPPED | OUTPATIENT
Start: 2024-10-04

## 2024-10-04 NOTE — PROGRESS NOTES
Doing well overall   Follow up dana scan WNL - pt declines additional MFM apts due to     Pt would like to do additional scans in our office - she states the MFM office makes her angry-     Continues taking metformin - pt states she feels better when taking this 500 mg daily      Plans to do blood sugars after this visit with CGM- with pick that up     Third tri labs will need to include TSH for family hx per MFM     SUSAN 4 weeks- will do CGM before that apt for review

## 2024-10-08 ENCOUNTER — TELEPHONE (OUTPATIENT)
Age: 29
End: 2024-10-08

## 2024-10-08 NOTE — TELEPHONE ENCOUNTER
Received a call from the pt regarding the hemorrhoid cream and an update. She has been informed that her nurses are currently working on this and have forwarded her message to the provider. Pt has expressed understanding.    She has also asked if we could avery her referral to derm as urgent. I have reprinted her referral and sent it to a [facility] dermatology clinic (per the pt's request) with the note that this will be an urgent visit. Pt has been made aware and has expressed understanding.

## 2024-10-09 ENCOUNTER — ROUTINE PRENATAL (OUTPATIENT)
Age: 29
End: 2024-10-09

## 2024-10-09 VITALS
SYSTOLIC BLOOD PRESSURE: 100 MMHG | HEART RATE: 95 BPM | DIASTOLIC BLOOD PRESSURE: 68 MMHG | BODY MASS INDEX: 42.38 KG/M2 | WEIGHT: 228 LBS

## 2024-10-09 DIAGNOSIS — K64.9 HEMORRHOIDS, UNSPECIFIED HEMORRHOID TYPE: ICD-10-CM

## 2024-10-09 DIAGNOSIS — Z34.90 PREGNANCY, UNSPECIFIED GESTATIONAL AGE: Primary | ICD-10-CM

## 2024-10-09 PROCEDURE — 0502F SUBSEQUENT PRENATAL CARE: CPT | Performed by: OBSTETRICS & GYNECOLOGY

## 2024-10-09 RX ORDER — HYDROCORTISONE ACETATE SUPPOSITORY 30 MG/1
1 SUPPOSITORY RECTAL 2 TIMES DAILY
Qty: 12 SUPPOSITORY | Refills: 0 | Status: SHIPPED | OUTPATIENT
Start: 2024-10-09 | End: 2024-10-10

## 2024-10-09 NOTE — PROGRESS NOTES
Seen due to severe pain assoc with hemorrhoid; CNM patient  ; has tried conservative management and topicals  <1cm infarcted hemorrhoid cleansed w betadine; injected with 1%lido w epi and clot excised

## 2024-10-10 ENCOUNTER — TELEPHONE (OUTPATIENT)
Age: 29
End: 2024-10-10

## 2024-10-10 RX ORDER — HYDROCORTISONE ACETATE SUPPOSITORY 30 MG/1
1 SUPPOSITORY RECTAL DAILY
Qty: 12 SUPPOSITORY | Refills: 1 | Status: SHIPPED | OUTPATIENT
Start: 2024-10-10 | End: 2024-10-22

## 2024-10-10 NOTE — TELEPHONE ENCOUNTER
Received a call from the  center stating that the patient was not able to come into the office today due to childcare.    She is also unable to  her scripts for GDM. I have informed her that the script was sent in yesterday. She just wanted to keep you in the loop regarding this pt.

## 2024-10-10 NOTE — TELEPHONE ENCOUNTER
I have received another call from [facility] stating that they reached out to the pt's pharmacy to check on the status of her GDM supplies. She was informed that the script was missing the test strips.    I have called the pharmacy and LM for a verbal script for this.

## 2024-10-10 NOTE — TELEPHONE ENCOUNTER
Received a call from Rashida in the  center. Pt's continuous glucose monitor will need a prior auth.    I have sent a message to Ruma Lei to see if this was an error in the way we ordered this or does she truly want the pt to have a continuous monitor.    Awaiting a response from the midwife as this is the only provider she wants involved in her care. Ruma is technically out of the office until Wednesday.

## 2024-10-11 DIAGNOSIS — O99.810 IMPAIRED GLUCOSE IN PREGNANCY, ANTEPARTUM: Primary | ICD-10-CM

## 2024-10-11 RX ORDER — LANCETS 30 GAUGE
1 EACH MISCELLANEOUS DAILY
Qty: 100 EACH | Refills: 5 | Status: SHIPPED | OUTPATIENT
Start: 2024-10-11

## 2024-10-11 RX ORDER — GLUCOSAMINE HCL/CHONDROITIN SU 500-400 MG
CAPSULE ORAL
Qty: 100 STRIP | Refills: 0 | Status: SHIPPED | OUTPATIENT
Start: 2024-10-11

## 2024-10-11 NOTE — TELEPHONE ENCOUNTER
Patient is requesting her glucose supplies. I have pended them for review and sign off.    She no longer desires the continuous monitor as that will require a prior auth and for her to have to wait a few days longer for response from insurance. She has provided the okay to get the regular monitor to finger prick. She is only supposed to be checking her blood sugars for one week? Per the pt? So she truly does not need a continuous monitor for this short of a period.    Scripts have been pended for review and sign off. Pt has been asked to give the provider until the end of the business day to have this signed off for her.

## 2024-10-11 NOTE — TELEPHONE ENCOUNTER
Received a call from the pt's pharmacy in regards to her script. They had called right as the scripts were sent into her pharmacy and he was able to pull them up as we were talking. She should be able to fill this now.

## 2024-10-15 ENCOUNTER — ROUTINE PRENATAL (OUTPATIENT)
Age: 29
End: 2024-10-15
Payer: COMMERCIAL

## 2024-10-15 VITALS — SYSTOLIC BLOOD PRESSURE: 120 MMHG | HEART RATE: 103 BPM | DIASTOLIC BLOOD PRESSURE: 79 MMHG

## 2024-10-15 DIAGNOSIS — O24.415 GESTATIONAL DIABETES MELLITUS (GDM) CONTROLLED ON ORAL HYPOGLYCEMIC DRUG, ANTEPARTUM: Primary | ICD-10-CM

## 2024-10-15 PROCEDURE — 76816 OB US FOLLOW-UP PER FETUS: CPT | Performed by: STUDENT IN AN ORGANIZED HEALTH CARE EDUCATION/TRAINING PROGRAM

## 2024-10-15 PROCEDURE — 99214 OFFICE O/P EST MOD 30 MIN: CPT | Performed by: STUDENT IN AN ORGANIZED HEALTH CARE EDUCATION/TRAINING PROGRAM

## 2024-10-15 RX ORDER — METFORMIN HCL 500 MG
500 TABLET, EXTENDED RELEASE 24 HR ORAL 2 TIMES DAILY
Qty: 90 TABLET | Refills: 1 | Status: SHIPPED | OUTPATIENT
Start: 2024-10-15

## 2024-10-15 NOTE — PROGRESS NOTES
Patient was seen 10/15/2024      Please look under media to view full consult and ultrasound report in ViewPoint.         Mary Nguyen MD   Maternal Fetal Medicine

## 2024-10-15 NOTE — PROCEDURES
PATIENT: RICHARDSON MENDENHALL   -  : 1995   -  DOS:10/15/2024   -  INTERPRETING PROVIDER:Mary Nguyen,   Indication  ========    Obesity in pregnancy    Method  ======    Transabdominal ultrasound examination. View: Sufficient    Pregnancy  =========    Guillen pregnancy. Number of fetuses: 1    Dating  ======    Previous Ultrasound on: 2024  Type of prior assessment: GA  GA at prior assessment date 9 w + 4 d  GA by previous U/S 25 w + 5 d  GEOVANNA by previous Ultrasound: 2025  Ultrasound examination on: 10/15/2024  GA by U/S based upon: AC, BPD, Femur, HC  GA by U/S 26 w + 4 d  GEOVANNA by U/S: 2025  Assigned: based on ultrasound (GA), selected on 2024  Assigned GA 25 w + 5 d  Assigned GEOVANNA: 2025    Fetal Biometry  ============    Standard  BPD 64.9 mm 26w 2d 58% Hadlock  OFD 88.4 mm 28w 3d 99% Nikhil  .2 mm 26w 4d 59% Hadlock  .1 mm 27w 2d 85% Hadlock  Femur 47.7 mm 26w 0d 43% Hadlock   g 26w 3d 79% Hadlock  EFW (lb) 2 lb  EFW (oz) 2 oz  EFW by: Hadlock (BPD-HC-AC-FL)  Extended  Nasal bone 8.3 mm  Other Structures   bpm    General Evaluation  ==============    Cardiac activity present.  bpm. Fetal movements: visualized. Presentation: Cephalic  Placenta: Placental site: anterior, appropriate distance from the internal os  Umbilical cord: Cord vessels: 3 vessel cord. Insertion site: central  Amniotic fluid: Amount of AF: normal. MVP 6.3 cm    Fetal Anatomy  ===========    Lateral ventricles: normal  Choroid plexus: normal  4-chamber view: normal  Heart / Thorax  Situs: situs solitus (normal)  Stomach: normal  Kidneys: normal  Bladder: normal  Cervical spine: normal  Thoracic spine: normal  Lumbar spine: normal  Sacral spine: normal  Wants to know fetal sex: no    Findings  =======    Intrauterine Guillen pregnancy at 25w 5d by clinical dates.  EFW is 970 g at 79%, abdominal circumference at 85%.  Anatomy visualized as stated above.  Amniotic fluid:

## 2024-10-16 LAB — HBA1C MFR BLD: 5.1 % (ref 4.8–5.6)

## 2024-11-07 PROBLEM — H66.90 OTITIS MEDIA: Status: RESOLVED | Noted: 2022-02-17 | Resolved: 2024-11-07

## 2024-11-11 ENCOUNTER — TELEPHONE (OUTPATIENT)
Age: 29
End: 2024-11-11

## 2024-11-12 ENCOUNTER — ROUTINE PRENATAL (OUTPATIENT)
Age: 29
End: 2024-11-12

## 2024-11-12 VITALS
HEIGHT: 62 IN | TEMPERATURE: 98.3 F | BODY MASS INDEX: 42.55 KG/M2 | OXYGEN SATURATION: 95 % | HEART RATE: 93 BPM | RESPIRATION RATE: 16 BRPM | DIASTOLIC BLOOD PRESSURE: 70 MMHG | SYSTOLIC BLOOD PRESSURE: 108 MMHG | WEIGHT: 231.2 LBS

## 2024-11-12 DIAGNOSIS — Z34.93 PRENATAL CARE IN THIRD TRIMESTER: ICD-10-CM

## 2024-11-12 DIAGNOSIS — Z36.9 PRENATAL SCREENING ENCOUNTER: Primary | ICD-10-CM

## 2024-11-12 PROBLEM — G47.00 INSOMNIA: Status: RESOLVED | Noted: 2022-02-17 | Resolved: 2024-11-12

## 2024-11-12 PROBLEM — N30.90 CYSTITIS: Status: RESOLVED | Noted: 2022-02-17 | Resolved: 2024-11-12

## 2024-11-12 PROBLEM — F33.0 DEPRESSION, MAJOR, RECURRENT, MILD (HCC): Status: RESOLVED | Noted: 2020-12-10 | Resolved: 2024-11-12

## 2024-11-12 PROBLEM — F39 EPISODIC MOOD DISORDER (HCC): Status: RESOLVED | Noted: 2022-02-17 | Resolved: 2024-11-12

## 2024-11-12 PROBLEM — K21.9 ESOPHAGEAL REFLUX: Status: RESOLVED | Noted: 2022-02-17 | Resolved: 2024-11-12

## 2024-11-12 PROBLEM — D64.9 ANEMIA: Status: RESOLVED | Noted: 2022-02-17 | Resolved: 2024-11-12

## 2024-11-12 PROBLEM — N30.20 CHRONIC CYSTITIS: Status: RESOLVED | Noted: 2022-02-17 | Resolved: 2024-11-12

## 2024-11-12 PROBLEM — M51.369 DEGENERATION OF INTERVERTEBRAL DISC OF LUMBAR REGION: Status: RESOLVED | Noted: 2022-02-17 | Resolved: 2024-11-12

## 2024-11-12 PROCEDURE — 0502F SUBSEQUENT PRENATAL CARE: CPT | Performed by: ADVANCED PRACTICE MIDWIFE

## 2024-11-12 NOTE — PROGRESS NOTES
SUSAN:  Lucia Gregory is a 29 y.o.  at 29w5d  who presents for routine OB appointment    No chief complaint on file.         LMP  (LMP Unknown)   FHTs: 140s  FH: 30.5    Tdap reviewed, Pt declines today.    ABO: AB POSITIVE     RhoGAM: N/A     Subjective: Doing well, no complaints. Good FM. Denies vaginal bleeding, Leaking of fluid, regular contractions.    Pt finished with 2 wks fo FSBS all normal.   Taking Metformin 500 Daily, tolerating.   3T labs today      Third trimester precautions given.   labor precautions reviewed.     Discussed IOL at 39.0-39.6 per MFM.     Pt can't find childcare to help with fine watching children during and that MFM appointments, discussed that we would make as many appointments at [facility] for ultrasounds next ultrasound will be at 32 weeks with a growth and starting weekly BPP's at that time        Follow up in 2 weeks.     Janette Mendoza, CHERYLE - LEONILA

## 2024-11-19 NOTE — PROGRESS NOTES
Verbal order obtained from Ruma Miller CNM for Growth and BPP Ultrasound and a diagnosis of Obesity in pregnancy. Order read back to LEONILA. Orders signed by this writer and sent for co-sign to LEONILA.

## 2024-11-20 ENCOUNTER — ROUTINE PRENATAL (OUTPATIENT)
Age: 29
End: 2024-11-20

## 2024-11-20 VITALS
TEMPERATURE: 98.3 F | RESPIRATION RATE: 20 BRPM | OXYGEN SATURATION: 96 % | SYSTOLIC BLOOD PRESSURE: 105 MMHG | HEART RATE: 92 BPM | BODY MASS INDEX: 42.62 KG/M2 | HEIGHT: 62 IN | DIASTOLIC BLOOD PRESSURE: 70 MMHG | WEIGHT: 231.6 LBS

## 2024-11-20 DIAGNOSIS — Z3A.30 30 WEEKS GESTATION OF PREGNANCY: ICD-10-CM

## 2024-11-20 DIAGNOSIS — O99.210 OBESITY IN PREGNANCY: Primary | ICD-10-CM

## 2024-11-20 PROCEDURE — 0502F SUBSEQUENT PRENATAL CARE: CPT

## 2024-11-20 NOTE — PROGRESS NOTES
OB Visit:    Ultrasound today.          Verbal order obtained from Shalom Lopez CNM for US and a diagnosis of Obesity in pregnancy. Order read back to LEONILA. Orders signed by this writer and sent for co-sign to LEONILA.

## 2024-11-20 NOTE — PROGRESS NOTES
SUSAN at 30w6d.  US today for obesity, pending MD read.  Doing well. FM+, denies LOF/VB/cxns.   Declines all vaccines.   Declines blood work today due to  issues but agrees to do third tri blood work at next visit on 11/29.  Discussed IOL [id number] consider.   RTO 11/29    Shalom Lopez, CHERYLE - SAVM

## 2024-11-29 ENCOUNTER — ROUTINE PRENATAL (OUTPATIENT)
Age: 29
End: 2024-11-29

## 2024-11-29 VITALS
OXYGEN SATURATION: 98 % | HEART RATE: 89 BPM | WEIGHT: 232 LBS | DIASTOLIC BLOOD PRESSURE: 70 MMHG | SYSTOLIC BLOOD PRESSURE: 102 MMHG | BODY MASS INDEX: 43.13 KG/M2

## 2024-11-29 DIAGNOSIS — O36.8130 DECREASED FETAL MOVEMENTS IN THIRD TRIMESTER, SINGLE OR UNSPECIFIED FETUS: ICD-10-CM

## 2024-11-29 DIAGNOSIS — Z34.93 PRENATAL CARE IN THIRD TRIMESTER: Primary | ICD-10-CM

## 2024-11-29 DIAGNOSIS — K59.01 SLOW TRANSIT CONSTIPATION: ICD-10-CM

## 2024-11-29 DIAGNOSIS — Z3A.32 32 WEEKS GESTATION OF PREGNANCY: ICD-10-CM

## 2024-11-29 PROCEDURE — 0502F SUBSEQUENT PRENATAL CARE: CPT | Performed by: ADVANCED PRACTICE MIDWIFE

## 2024-11-29 RX ORDER — DOCUSATE SODIUM 100 MG/1
100 CAPSULE, LIQUID FILLED ORAL 2 TIMES DAILY
Qty: 60 CAPSULE | Refills: 1 | Status: SHIPPED | OUTPATIENT
Start: 2024-11-29 | End: 2025-01-28

## 2024-11-29 NOTE — PROGRESS NOTES
+FM   Pt reports no fetal movement in 3 days. Pt placed on NST per Midwife Janette.     Pt states later she has felt baby move this AM.

## 2024-11-29 NOTE — PROGRESS NOTES
SUSAN:  Luica Gregory is a 29 y.o.  at 32w1d  who presents for routine OB appointment    Chief Complaint   Patient presents with    Routine Prenatal Visit          LMP  (LMP Unknown)   FHTs: 150s  FH: 32    Tdap reviewed, Pt declines today.    ABO: AB POSITIVE     RhoGAM: N/A     Subjective: Doing well, no complaints.  Denies vaginal bleeding, Leaking of fluid, regular contractions.    Pt does report decreased FM x 3 days. Will get NST and BPP today    NST procedure note    Lucia Gregory is a ,  29 y.o. female White (non-) whose No LMP recorded (lmp unknown). Patient is pregnant.  was on  who presents for fetal non-stress test.    She is 32w1d and was monitored for 20 minutes and the FHR was reactive, with accelerations.    NST Interpretation:    FHR baseline 155 bpm, variability moderate, accelerations present, decelerations Absent.    Uterine contractions were absent.    Lucia was informed of the NST results and her questions were answered.       US today:  Wet read:  BPP 8 out of 8, CHYNA 12.6, vertex.  Patient now able to feel baby move much better    Pt has not done the rest of the 3T labs, will complete today  Third trimester precautions given.   labor precautions reviewed. We discussed at length fetal kick counts 5 in 60 min or 10 in 2 hours, and when to call if not adequate.     Patient reports having some problems with constipation will send Colace  Follow up in 2 weeks.     CHERYLE Ivy CNM

## 2024-12-02 ENCOUNTER — HOSPITAL ENCOUNTER (EMERGENCY)
Facility: HOSPITAL | Age: 29
Discharge: HOME | End: 2024-12-02
Payer: COMMERCIAL

## 2024-12-02 VITALS
DIASTOLIC BLOOD PRESSURE: 61 MMHG | SYSTOLIC BLOOD PRESSURE: 114 MMHG | OXYGEN SATURATION: 98 % | HEART RATE: 94 BPM | TEMPERATURE: 98.3 F | RESPIRATION RATE: 18 BRPM

## 2024-12-02 LAB
ABO + RH BLD: NORMAL
APPEARANCE UR: CLEAR
BACTERIA URNS QL MICRO: ABNORMAL /HPF
BILIRUB UR QL: NEGATIVE
BLOOD BANK CMNT PATIENT-IMP: NORMAL
BLOOD GROUP ANTIBODIES SERPL: NORMAL
COLOR UR: ABNORMAL
EPITH CASTS URNS QL MICRO: ABNORMAL /LPF
ERYTHROCYTE [DISTWIDTH] IN BLOOD BY AUTOMATED COUNT: 13.7 % (ref 11.5–14.5)
GLUCOSE UR STRIP.AUTO-MCNC: NEGATIVE MG/DL
HCT VFR BLD AUTO: 31.3 % (ref 35–47)
HGB BLD-MCNC: 9.5 G/DL (ref 11.5–16)
HGB UR QL STRIP: NEGATIVE
HIV 1+2 AB+HIV1 P24 AG SERPL QL IA: NONREACTIVE
HIV 1/2 RESULT COMMENT: NORMAL
KETONES UR QL STRIP.AUTO: NEGATIVE MG/DL
LEUKOCYTE ESTERASE UR QL STRIP.AUTO: NEGATIVE
MCH RBC QN AUTO: 24 PG (ref 26–34)
MCHC RBC AUTO-ENTMCNC: 30.4 G/DL (ref 30–36.5)
MCV RBC AUTO: 79 FL (ref 80–99)
NITRITE UR QL STRIP.AUTO: NEGATIVE
NRBC # BLD: 0 K/UL (ref 0–0.01)
NRBC BLD-RTO: 0 PER 100 WBC
PH UR STRIP: 6.5 (ref 5–8)
PLATELET # BLD AUTO: 263 K/UL (ref 150–400)
PMV BLD AUTO: 11.2 FL (ref 8.9–12.9)
PROT UR STRIP-MCNC: NEGATIVE MG/DL
RBC # BLD AUTO: 3.96 M/UL (ref 3.8–5.2)
RBC #/AREA URNS HPF: ABNORMAL /HPF (ref 0–5)
SP GR UR REFRACTOMETRY: <1.005 (ref 1–1.03)
SPECIMEN EXP DATE BLD: NORMAL
T PALLIDUM AB SER QL IA: NON REACTIVE
TSH SERPL DL<=0.05 MIU/L-ACNC: 0.9 UIU/ML (ref 0.36–3.74)
URINE CULTURE IF INDICATED: ABNORMAL
UROBILINOGEN UR QL STRIP.AUTO: 0.2 EU/DL (ref 0.2–1)
WBC # BLD AUTO: 8.2 K/UL (ref 3.6–11)
WBC URNS QL MICRO: ABNORMAL /HPF (ref 0–4)

## 2024-12-02 PROCEDURE — 4500000002 HC ER NO CHARGE

## 2024-12-02 PROCEDURE — 99283 EMERGENCY DEPT VISIT LOW MDM: CPT

## 2024-12-02 PROCEDURE — 81001 URINALYSIS AUTO W/SCOPE: CPT

## 2024-12-02 ASSESSMENT — ENCOUNTER SYMPTOMS
ALLERGIC/IMMUNOLOGIC NEGATIVE: 1
ABDOMINAL DISTENTION: 1
EYES NEGATIVE: 1
RESPIRATORY NEGATIVE: 1

## 2024-12-02 NOTE — DISCHARGE INSTRUCTIONS
Weeks 32 to 34 of Your Pregnancy: Care Instructions    Decide whether you want to bank or donate your baby's umbilical cord blood. If you want to save this blood, you have to arrange for it ahead of time.   Decide about circumcision. Personal, Baptism, or cultural beliefs may play a role in your decision. You get to decide what you want for your baby.         Learn how to ease hemorrhoids.   Get more liquids, fruits, vegetables, and fiber in your diet.  Avoid sitting for too long.  Clean yourself with moist toilet paper. Or try witch hazel pads.  Try ice packs or warm sitz baths for discomfort.  Use hydrocortisone cream for pain or itching.  Ask your doctor about stool softeners.        Consider the benefits of breastfeeding.   It reduces your baby's risk of sudden infant death syndrome (SIDS).   babies are less likely to get certain infections. And they're less likely to be obese or get diabetes later in life.  It can lower your risk of breast and ovarian cancers and osteoporosis.  It saves you money.  Follow-up care is a key part of your treatment and safety. Be sure to make and go to all appointments, and call your doctor if you are having problems. It's also a good idea to know your test results and keep a list of the medicines you take.  Where can you learn more?  Go to [email or web address] and enter X711 to learn more about \"Weeks 32 to 34 of Your Pregnancy: Care Instructions.\"  Current as of: July 10, 2023  Content Version: 14.2  © 2024 [employer].   Care instructions adapted under license by [employer]. If you have questions about a medical condition or this instruction, always ask your healthcare professional. [employer] disclaims any warranty or liability for your use of this information.      Round Ligament Pain: Care Instructions  Overview     Round ligament pain is a common pain during pregnancy. You may feel a sharp brief pain on one or

## 2024-12-02 NOTE — ED TRIAGE NOTES
1645: patient arrived ambulatory to [facility] c/o contractions since 2200 last night.     1659: called to inform Dr Lopez of patient's arrival.  Plan to send UA.     1718: Dr Lopez at bedside evaluating patient.     1726: SVE closed.  Plan to discharge home.

## 2024-12-02 NOTE — ED PROVIDER NOTES
History & Physical    Name: Lucia Gregory MRN: [id number]  SSN: [id number]    YOB: 1995  Age: 29 y.o.  Sex: female      Subjective:     Reason for Admission:  Pregnancy and Contractions    History of Present Illness: Lucia Gregory is a 29 y.o.  female with an estimated gestational age of 32w4d with Estimated Date of Delivery: 25. Patient complains of moderate contractions for 1 days. Pregnancy has been complicated by decreased fetal movement , nausea and vomiting, and Maternal Obesity .  Patient denies chest pain, fever, headache , nausea and vomiting, pelvic pressure, right upper quadrant pain  , shortness of breath, swelling, vaginal bleeding , vaginal leaking of fluid , and visual disturbances.    OB History          3    Para   2    Term   2            AB        Living   2         SAB        IAB        Ectopic        Molar        Multiple        Live Births                  Past Medical History:   Diagnosis Date    ADHD (attention deficit hyperactivity disorder)     Anemia 2022    Anemia 2022    Anxiety     Anxiety     Borderline personality disorder (HCC)     Borderline personality disorder (HCC)     Chronic cystitis 2022    Cystitis 2022    Degeneration of intervertebral disc of lumbar region 2022    Depression     Esophageal reflux 2022    History of removal of left breast implant 2020    History of removal of right breast implant 2020    Hx of breast implants, bilateral 2017    Removed due to implant reaction    Otitis media 2022    PCOS (polycystic ovarian syndrome)     Polycystic disease, ovaries     Postpartum depression     Trauma     in the past    Unable to get pregnant, female 12/10/2020     Past Surgical History:   Procedure Laterality Date    BREAST SURGERY  2017    BREAST SURGERY  2017    Augmentation    REMOVAL INTACT BREAST IMPLANT  2020    bilateral breast implant removal     Social History

## 2024-12-04 ENCOUNTER — TELEPHONE (OUTPATIENT)
Age: 29
End: 2024-12-04

## 2024-12-04 NOTE — TELEPHONE ENCOUNTER
Received a call from the pt asking to have a call back from our  regarding reaching out to our office to have her Friday appt moved around. The message has been relayed to the manager and she states she will reach out to the pt.

## 2024-12-05 ENCOUNTER — TELEPHONE (OUTPATIENT)
Age: 29
End: 2024-12-05

## 2024-12-05 NOTE — TELEPHONE ENCOUNTER
Skyler Craven,    She is welcome to reschedule. If she is having normal fetal movements I am not worried about moving her apt to the next week. I can not offer reassurance without an assessment other than to say if her baby is moving normally and she has no other concerns I am fine with her coming next week.    She is welcome to schedule Wednesday at [facility]- however Dr. Ortiz is there on Wednesdays, a midwife is only there on Tuesdays.     Balaji Roberto

## 2024-12-05 NOTE — TELEPHONE ENCOUNTER
Received a call from the pt as she states her dog is sick and she is unable to come tomorrow. She wants to know if she can cancel her appt this week and then switch her appt next week to Wednesday at [facility].    She just knows this week is rough. She states she will need to take her dog in to the vet this week/in the next few days. She wants to make sure that her missing this scan will not jeopardize the health of her unborn baby. If it is a MUST that she is seen this week, she will try to make tomorrow work.

## 2024-12-13 ENCOUNTER — ROUTINE PRENATAL (OUTPATIENT)
Age: 29
End: 2024-12-13

## 2024-12-13 VITALS
DIASTOLIC BLOOD PRESSURE: 75 MMHG | HEART RATE: 117 BPM | BODY MASS INDEX: 42.69 KG/M2 | TEMPERATURE: 99 F | SYSTOLIC BLOOD PRESSURE: 119 MMHG | HEIGHT: 62 IN | WEIGHT: 232 LBS | RESPIRATION RATE: 15 BRPM | OXYGEN SATURATION: 97 %

## 2024-12-13 DIAGNOSIS — Z3A.34 34 WEEKS GESTATION OF PREGNANCY: ICD-10-CM

## 2024-12-13 DIAGNOSIS — Z34.90 PREGNANCY, UNSPECIFIED GESTATIONAL AGE: Primary | ICD-10-CM

## 2024-12-13 LAB
ALBUMIN SERPL-MCNC: 2.5 G/DL (ref 3.5–5)
ALBUMIN/GLOB SERPL: 0.7 (ref 1.1–2.2)
ALP SERPL-CCNC: 103 U/L (ref 45–117)
ALT SERPL-CCNC: 11 U/L (ref 12–78)
ANION GAP SERPL CALC-SCNC: 8 MMOL/L (ref 2–12)
AST SERPL-CCNC: 14 U/L (ref 15–37)
BILIRUB SERPL-MCNC: 0.4 MG/DL (ref 0.2–1)
BUN SERPL-MCNC: 8 MG/DL (ref 6–20)
BUN/CREAT SERPL: 13 (ref 12–20)
CALCIUM SERPL-MCNC: 9.3 MG/DL (ref 8.5–10.1)
CHLORIDE SERPL-SCNC: 108 MMOL/L (ref 97–108)
CO2 SERPL-SCNC: 22 MMOL/L (ref 21–32)
CREAT SERPL-MCNC: 0.6 MG/DL (ref 0.55–1.02)
GLOBULIN SER CALC-MCNC: 3.7 G/DL (ref 2–4)
GLUCOSE SERPL-MCNC: 111 MG/DL (ref 65–100)
POTASSIUM SERPL-SCNC: 4 MMOL/L (ref 3.5–5.1)
PROT SERPL-MCNC: 6.2 G/DL (ref 6.4–8.2)
SODIUM SERPL-SCNC: 138 MMOL/L (ref 136–145)

## 2024-12-13 PROCEDURE — 0502F SUBSEQUENT PRENATAL CARE: CPT

## 2024-12-13 RX ORDER — METFORMIN HYDROCHLORIDE 500 MG/1
500 TABLET, EXTENDED RELEASE ORAL 2 TIMES DAILY
Qty: 90 TABLET | Refills: 0 | Status: SHIPPED | OUTPATIENT
Start: 2024-12-13

## 2024-12-13 NOTE — PROGRESS NOTES
SUSAN at 34w1d.  Doing well. FM+, denies LOF/VB/cxns.  CMP today per MFM.  Reviewed low iron. Pt has been taking iron once daily, opts to increase to BID. Reviewed borderline for iron transfusion, will start taking BID consistently.  Metformin refill sent.   US today pending MD read. BPP 8/8, CHYNA 15.81.  RTO 1 wk for BPP.  Shalom Lopez, CHERYLE - SAVM

## 2024-12-13 NOTE — PROGRESS NOTES
Patient arrived to room with no concerns or complaints    +FM    Desires prescription renewal for metformin

## 2024-12-20 ENCOUNTER — TELEPHONE (OUTPATIENT)
Age: 29
End: 2024-12-20

## 2024-12-20 ENCOUNTER — ROUTINE PRENATAL (OUTPATIENT)
Age: 29
End: 2024-12-20

## 2024-12-20 VITALS
BODY MASS INDEX: 44.56 KG/M2 | WEIGHT: 236 LBS | RESPIRATION RATE: 15 BRPM | DIASTOLIC BLOOD PRESSURE: 83 MMHG | SYSTOLIC BLOOD PRESSURE: 115 MMHG | HEART RATE: 97 BPM | HEIGHT: 61 IN | OXYGEN SATURATION: 97 % | TEMPERATURE: 98.8 F

## 2024-12-20 DIAGNOSIS — Z34.93 PRENATAL CARE IN THIRD TRIMESTER: Primary | ICD-10-CM

## 2024-12-20 DIAGNOSIS — Z3A.35 35 WEEKS GESTATION OF PREGNANCY: ICD-10-CM

## 2024-12-20 PROCEDURE — 0502F SUBSEQUENT PRENATAL CARE: CPT | Performed by: ADVANCED PRACTICE MIDWIFE

## 2024-12-20 NOTE — PROGRESS NOTES
SUSAN:  Lucia Gregory is a 29 y.o.  at 35w1d  who presents for routine OB appointment    Chief Complaint   Patient presents with    Routine Prenatal Visit          /83   Pulse 97   Temp 98.8 °F (37.1 °C) (Oral)   Resp 15   Ht 1.549 m (5' 1\")   Wt 107 kg (236 lb)   LMP  (LMP Unknown)   SpO2 97%   BMI 44.59 kg/m²   FHTs: 140  FH: 35      ABO: AB POSITIVE     RhoGAM: N/A     Subjective: Doing well, no complaints. Good FM. Denies vaginal bleeding, Leaking of fluid, regular contractions.    Reviewed CMP no need for redraw    BPP today , EFW 84.1 %     Third trimester precautions given.   labor precautions reviewed.     Follow up in 1 weeks.     CHERYLE Ivy CNM

## 2024-12-20 NOTE — TELEPHONE ENCOUNTER
Called pt. Pt paged and had been feeling less fetal movement. Pt took shower and now feeling a lot of activity. Denies LOF/VB/cxns. Will continue FKC daily.     CHERYLE Gillespie CNM

## 2024-12-20 NOTE — PROGRESS NOTES
Patient arrived to room with no concerns or complaints    +FM, denies contractions, LOF, vaginal bleeding    States that yesterday she had an episode of decreased fetal movement, but took a shower and baby started moving \"a lot\"    CMP drawn at last visit

## 2024-12-24 DIAGNOSIS — Z34.93 PRENATAL CARE IN THIRD TRIMESTER: Primary | ICD-10-CM

## 2024-12-24 NOTE — PROGRESS NOTES
Patient to be seen in office 12/30/2024 for BPP ultrasound and prenatal visit. Ultrasound pended for signing.

## 2024-12-30 ENCOUNTER — PATIENT MESSAGE (OUTPATIENT)
Age: 29
End: 2024-12-30

## 2024-12-30 ENCOUNTER — ROUTINE PRENATAL (OUTPATIENT)
Age: 29
End: 2024-12-30

## 2024-12-30 VITALS
BODY MASS INDEX: 44.86 KG/M2 | DIASTOLIC BLOOD PRESSURE: 78 MMHG | SYSTOLIC BLOOD PRESSURE: 118 MMHG | WEIGHT: 237.4 LBS | OXYGEN SATURATION: 92 % | HEART RATE: 99 BPM | RESPIRATION RATE: 16 BRPM | TEMPERATURE: 98.2 F

## 2024-12-30 DIAGNOSIS — E28.2 PCOS (POLYCYSTIC OVARIAN SYNDROME): ICD-10-CM

## 2024-12-30 DIAGNOSIS — D64.9 ANEMIA, UNSPECIFIED TYPE: ICD-10-CM

## 2024-12-30 DIAGNOSIS — Z34.93 PRENATAL CARE IN THIRD TRIMESTER: Primary | ICD-10-CM

## 2024-12-30 DIAGNOSIS — Z36.85 ANTENATAL SCREENING FOR STREPTOCOCCUS B: ICD-10-CM

## 2024-12-30 DIAGNOSIS — Z3A.09 9 WEEKS GESTATION OF PREGNANCY: ICD-10-CM

## 2024-12-30 DIAGNOSIS — Z3A.34 34 WEEKS GESTATION OF PREGNANCY: ICD-10-CM

## 2024-12-30 DIAGNOSIS — Z34.90 PREGNANCY, UNSPECIFIED GESTATIONAL AGE: ICD-10-CM

## 2024-12-30 PROCEDURE — 0502F SUBSEQUENT PRENATAL CARE: CPT | Performed by: ADVANCED PRACTICE MIDWIFE

## 2024-12-30 RX ORDER — METFORMIN HYDROCHLORIDE 500 MG/1
500 TABLET, EXTENDED RELEASE ORAL 2 TIMES DAILY
Qty: 180 TABLET | Refills: 1 | Status: SHIPPED | OUTPATIENT
Start: 2024-12-30 | End: 2025-06-28

## 2024-12-30 RX ORDER — PNV NO.95/FERROUS FUM/FOLIC AC 28MG-0.8MG
1 TABLET ORAL 2 TIMES DAILY
Qty: 180 TABLET | Refills: 0 | Status: SHIPPED | OUTPATIENT
Start: 2024-12-30 | End: 2025-03-30

## 2024-12-31 LAB
BASOPHILS # BLD: 0 K/UL (ref 0–0.1)
BASOPHILS NFR BLD: 0 % (ref 0–1)
DIFFERENTIAL METHOD BLD: ABNORMAL
EOSINOPHIL # BLD: 0.2 K/UL (ref 0–0.4)
EOSINOPHIL NFR BLD: 3 % (ref 0–7)
ERYTHROCYTE [DISTWIDTH] IN BLOOD BY AUTOMATED COUNT: 14.8 % (ref 11.5–14.5)
FERRITIN SERPL-MCNC: 3 NG/ML (ref 26–388)
HCT VFR BLD AUTO: 31 % (ref 35–47)
HGB BLD-MCNC: 9.3 G/DL (ref 11.5–16)
IMM GRANULOCYTES # BLD AUTO: 0.1 K/UL (ref 0–0.04)
IMM GRANULOCYTES NFR BLD AUTO: 1 % (ref 0–0.5)
IRON SATN MFR SERPL: 4 % (ref 20–50)
IRON SERPL-MCNC: 19 UG/DL (ref 35–150)
LYMPHOCYTES # BLD: 2.2 K/UL (ref 0.8–3.5)
LYMPHOCYTES NFR BLD: 23 % (ref 12–49)
MCH RBC QN AUTO: 22.6 PG (ref 26–34)
MCHC RBC AUTO-ENTMCNC: 30 G/DL (ref 30–36.5)
MCV RBC AUTO: 75.4 FL (ref 80–99)
MONOCYTES # BLD: 0.7 K/UL (ref 0–1)
MONOCYTES NFR BLD: 8 % (ref 5–13)
NEUTS SEG # BLD: 6.2 K/UL (ref 1.8–8)
NEUTS SEG NFR BLD: 65 % (ref 32–75)
NRBC # BLD: 0 K/UL (ref 0–0.01)
NRBC BLD-RTO: 0 PER 100 WBC
PLATELET # BLD AUTO: 268 K/UL (ref 150–400)
PMV BLD AUTO: 11.1 FL (ref 8.9–12.9)
RBC # BLD AUTO: 4.11 M/UL (ref 3.8–5.2)
TIBC SERPL-MCNC: 527 UG/DL (ref 250–450)
WBC # BLD AUTO: 9.4 K/UL (ref 3.6–11)

## 2024-12-31 NOTE — PROGRESS NOTES
SUSAN:  Lucia Gregory is a 29 y.o.  at 36w4d  who presents for routine OB appointment        /78 (Site: Right Upper Arm, Position: Sitting, Cuff Size: Large Adult)   Pulse 99   Temp 98.2 °F (36.8 °C) (Oral)   Resp 16   Wt 107.7 kg (237 lb 6.4 oz)   LMP  (LMP Unknown)   SpO2 92%   BMI 44.86 kg/m²   FHTs: 146  FH: 37    GBS: Collected  Pt Declines  cervical exam today.  Cervical exam: declines     Ultrasound today, wet read:  BPP 8 out of 8, CHYNA 16    Subjective: Doing well, no complaints. Good FM. Denies vaginal bleeding, Leaking of fluid, regular contractions.    We discussed to repeat patient's iron levels patient agreeable for lab draw today    Tdap reviewed, Pt declines today.    Third trimester precautions given.    Agreeable to 39w IOL, pt reports can't do it  or .     Follow up in 1 weeks.     CHERYLE Ivy CNM

## 2025-01-01 LAB
GP B STREP DNA SPEC QL NAA+PROBE: NEGATIVE
SPECIMEN SOURCE: NORMAL

## 2025-01-02 PROBLEM — E61.1 IRON DEFICIENCY: Status: ACTIVE | Noted: 2025-01-02

## 2025-01-02 PROBLEM — O99.019 ANEMIA IN PREGNANCY: Status: ACTIVE | Noted: 2025-01-02

## 2025-01-07 NOTE — TELEPHONE ENCOUNTER
Pt called and I spoke to her- we reviewed concerns and I reassured pt of hospital resources.     Balaji Roberto

## 2025-01-09 ENCOUNTER — HOSPITAL ENCOUNTER (OUTPATIENT)
Facility: HOSPITAL | Age: 30
Setting detail: INFUSION SERIES
Discharge: HOME | End: 2025-01-09
Payer: MEDICAID

## 2025-01-09 VITALS
OXYGEN SATURATION: 98 % | SYSTOLIC BLOOD PRESSURE: 117 MMHG | RESPIRATION RATE: 16 BRPM | DIASTOLIC BLOOD PRESSURE: 79 MMHG | TEMPERATURE: 98.6 F | HEART RATE: 85 BPM

## 2025-01-09 DIAGNOSIS — O99.019 ANTEPARTUM ANEMIA: Primary | ICD-10-CM

## 2025-01-09 DIAGNOSIS — E61.1 IRON DEFICIENCY: ICD-10-CM

## 2025-01-09 PROCEDURE — 96374 THER/PROPH/DIAG INJ IV PUSH: CPT

## 2025-01-09 PROCEDURE — 6360000002 HC RX W HCPCS: Performed by: MIDWIFE

## 2025-01-09 RX ORDER — SODIUM CHLORIDE 9 MG/ML
5-250 INJECTION, SOLUTION INTRAVENOUS PRN
OUTPATIENT
Start: 2025-01-16

## 2025-01-09 RX ORDER — SODIUM CHLORIDE 9 MG/ML
5-250 INJECTION, SOLUTION INTRAVENOUS PRN
Status: DISCONTINUED | OUTPATIENT
Start: 2025-01-09 | End: 2025-01-10 | Stop reason: HOSPADM

## 2025-01-09 RX ADMIN — IRON SUCROSE 100 MG: 20 INJECTION, SOLUTION INTRAVENOUS at 13:05

## 2025-01-09 NOTE — PROGRESS NOTES
[facility] Short Note                       Date: 2025    Name: Lucia Gregory    MRN: [id number]         : 1995    Treatment: Venofer #1    [facility] COVID-19 SCREENING      The patient was asked the following questions and answered as documented below:    Do you have any symptoms of COVID-19?  SOB, coughing, fever, or generally not feeling well? NO  Have you been exposed to COVID-19 recently? NO  Have you had any recent contact with family/friend that has a pending COVID test? NO      Follow Up: Proceed with treatment    Ms. Gregory was assessed and education was provided. No distress noted. VSS. IV placed to the RAC w/o difficulty.     Lines:   Peripheral IV 25 Right Antecubital (Active)   Site Assessment Clean, dry & intact 25 1304   Line Status Brisk blood return 25 1304   Phlebitis Assessment No symptoms 25 1304   Infiltration Assessment 0 25 1304   Dressing Status New dressing applied 25 1304   Dressing Type Transparent 25 1304   Dressing Intervention New 25 1304        Ms. Bright vitals were reviewed prior to and after treatment.   Patient Vitals for the past 12 hrs:   Temp Pulse Resp BP SpO2   25 1337 -- 85 -- 117/79 --   25 1313 -- 78 -- 111/69 --   25 1257 98.6 °F (37 °C) 96 16 (!) 114/91 98 %         Medications given:  Medications Administered         iron sucrose (VENOFER) injection 100 mg Admin Date  2025 Action  Given Dose  100 mg Route  IntraVENous Documented By  Brooke Mcclure, RN            Ms. Gregory tolerated the treatment without complaints. Observation complete, VSS, IV flushed and removed.     [facility] at 1345.      Patient provided with AVS , which includes future appointment and written educational material.     Future Appointments   Date Time Provider Department Center   1/15/2025  9:00 AM [id number] AMB   1/15/2025

## 2025-01-09 NOTE — DISCHARGE INSTRUCTIONS
iron sucrose (injection)  Pronunciation:  EYE urn CATHY osei  Brand:  Venofer  What is the most important information I should know about iron sucrose?  Follow all directions on your medicine label and package. Tell each of your healthcare providers about all your medical conditions, allergies, and all medicines you use.  What is iron sucrose?  Iron sucrose is used to treat iron deficiency anemia in people with kidney disease.  Iron sucrose is for use in adults and children at least 2 years old.  Iron sucrose is not for treating other forms of anemia not caused by iron deficiency.   Iron sucrose may also be used for purposes not listed in this medication guide.  What should I discuss with my healthcare provider before I receive iron sucrose?  You should not be treated with this medicine if you have ever had an allergic reaction to an iron injection.  Tell your doctor if you have ever had:  hemochromatosis or iron overload (the buildup of excess iron).  Tell your doctor if you are pregnant or plan to become pregnant. Iron sucrose can harm an unborn baby if you have a severe reaction to this medicine during your second or third trimester. However, not treating iron deficiency anemia during pregnancy may cause complications such as premature birth or low birth weight. The benefit of treating your condition during pregnancy may outweigh any risks.  If you are breastfeeding, tell your doctor if you notice diarrhea or constipation in the nursing baby.  How is iron sucrose given?  Iron sucrose is given as an infusion into a vein. A healthcare provider will give you this injection.  This medicine is sometimes given slowly, and the infusion can take up to 2.5 hours to complete.  Tell your caregivers if you feel any burning, pain, or swelling around the IV needle when iron sucrose is injected.  You will be watched closely for at least 30 minutes to make sure you do not have an allergic reaction.  You will need frequent  pharmacist.  Copyright 4147-1588 [employer] Version: 7.01. Revision date: 1/27/2021.  Care instructions adapted under license by [employer]. If you have questions about a medical condition or this instruction, always ask your healthcare professional. Healthwise, Incorporated disclaims any warranty or liability for your use of this information.

## 2025-01-14 ENCOUNTER — TELEPHONE (OUTPATIENT)
Age: 30
End: 2025-01-14

## 2025-01-14 ENCOUNTER — HOSPITAL ENCOUNTER (OUTPATIENT)
Facility: HOSPITAL | Age: 30
Setting detail: INFUSION SERIES
End: 2025-01-14

## 2025-01-14 DIAGNOSIS — Z3A.38 38 WEEKS GESTATION OF PREGNANCY: Primary | ICD-10-CM

## 2025-01-15 ENCOUNTER — ROUTINE PRENATAL (OUTPATIENT)
Age: 30
End: 2025-01-15

## 2025-01-15 VITALS — SYSTOLIC BLOOD PRESSURE: 125 MMHG | WEIGHT: 240 LBS | BODY MASS INDEX: 45.35 KG/M2 | DIASTOLIC BLOOD PRESSURE: 69 MMHG

## 2025-01-15 DIAGNOSIS — Z3A.38 38 WEEKS GESTATION OF PREGNANCY: Primary | ICD-10-CM

## 2025-01-15 PROCEDURE — 0502F SUBSEQUENT PRENATAL CARE: CPT

## 2025-01-15 NOTE — TELEPHONE ENCOUNTER
Lucia called to report that her baby is moving less than normal.  While on the phone, she reports that the baby is moving now.  She has been sick with a sore throat and sniffles today.  She has spent most of the day in bed. Told to be seen in urgent care tomorrow if her throat is still sore then.  She may need an antibiotic for strep throat.  Instructed on fetal kick count.  She is to eat, then lay on her side.  If she does not get 10 movements in an hour she she call back and will need to come to L&D for evaluation.  Also invited to come to L&D if she is unsure, worried or feels there is something wrong.

## 2025-01-15 NOTE — PROGRESS NOTES
SUSAN at 38w6d.  Doing well. FM+, denies LOF/VB. Occ cxns. Had some decreased movement today but BPP 8/8 and feeling movement now.  BPP wet read 8/8, CHYNA 15.09.  Reviewed SOL, fkc and warning s/s.   Declining her IOL tomorrow.  Has another IV iron infusion tomorrow. Would like to have this on board before delivery.   Agrees to come in Friday for assessment and to develop plan.   Requesting IOL Sunday.     CHERYLE Gillespie - LEONILA

## 2025-01-15 NOTE — PROGRESS NOTES
BPP today  IOL scheduled 1/16 (wants to cancel this)  Cxl'd visits on 1/22 as patient will be delivered   Doing well overall, some hip pain   She reports decreased fetal movement today; states she hasn't had a lot to eat today  Taking Metformin 500mg once daily  Considering a cervical exam today  Jazlyn Gtz, MADELEINE  1/15/2025  3:18 PM

## 2025-01-16 ENCOUNTER — HOSPITAL ENCOUNTER (OUTPATIENT)
Facility: HOSPITAL | Age: 30
Setting detail: INFUSION SERIES
Discharge: HOME | End: 2025-01-16
Payer: MEDICAID

## 2025-01-16 VITALS
SYSTOLIC BLOOD PRESSURE: 116 MMHG | HEART RATE: 82 BPM | DIASTOLIC BLOOD PRESSURE: 78 MMHG | OXYGEN SATURATION: 97 % | RESPIRATION RATE: 16 BRPM | TEMPERATURE: 97.2 F

## 2025-01-16 DIAGNOSIS — E61.1 IRON DEFICIENCY: Primary | ICD-10-CM

## 2025-01-16 DIAGNOSIS — O99.019 ANTEPARTUM ANEMIA: ICD-10-CM

## 2025-01-16 PROCEDURE — 96374 THER/PROPH/DIAG INJ IV PUSH: CPT

## 2025-01-16 PROCEDURE — 6360000002 HC RX W HCPCS: Performed by: MIDWIFE

## 2025-01-16 RX ORDER — SODIUM CHLORIDE 9 MG/ML
5-250 INJECTION, SOLUTION INTRAVENOUS PRN
OUTPATIENT
Start: 2025-01-23

## 2025-01-16 RX ADMIN — IRON SUCROSE 100 MG: 20 INJECTION, SOLUTION INTRAVENOUS at 14:16

## 2025-01-16 NOTE — PROGRESS NOTES
[facility] Short Note                       Date: 2025    Name: Lucia Gregory    MRN: [id number]         : 1995    Treatment: Venofer #2    [facility] COVID-19 SCREENING      The patient was asked the following questions and answered as documented below:    Do you have any symptoms of COVID-19?  SOB, coughing, fever, or generally not feeling well? NO  Have you been exposed to COVID-19 recently? NO  Have you had any recent contact with family/friend that has a pending COVID test? NO      Follow Up: Proceed with treatment    Ms. Gregory was assessed and education was provided. No changes to report. VSS. IV placed to the RAC w/o difficulty.     Lines:   Peripheral IV 25 Right Antecubital (Active)   Site Assessment Clean, dry & intact 25   Line Status Blood return noted 25 141   Phlebitis Assessment No symptoms 25   Infiltration Assessment 0 25 141   Dressing Status New dressing applied 25   Dressing Type Transparent 25   Dressing Intervention New 25 141        Ms. Bright vitals were reviewed prior to and after treatment.   Patient Vitals for the past 12 hrs:   Temp Pulse Resp BP SpO2   25 1447 -- 82 -- 116/78 --   25 1405 97.2 °F (36.2 °C) 82 16 129/82 97 %       Medications given:    Medications Administered         iron sucrose (VENOFER) injection 100 mg Admin Date  2025 Action  Given Dose  100 mg Route  IntraVENous Documented By  Brooke Mcclure RN              Ms. Gregory tolerated the treatment without complaints. Observation  complete, VSS, IV flushed and removed.     [facility] in [facility] condition at 1450.      Patient provided with future appointment.     Future Appointments   Date Time Provider Department Center   2025  8:40 AM Janette Mendoza APRN - CNM [facility] AMB   2025  2:00 PM [facility] NURSE 1 [facility]       BROOKE MCCLURE RN  ,  2025  2:16 PM

## 2025-01-17 ENCOUNTER — ROUTINE PRENATAL (OUTPATIENT)
Age: 30
End: 2025-01-17

## 2025-01-17 VITALS
TEMPERATURE: 98.7 F | BODY MASS INDEX: 43.79 KG/M2 | HEIGHT: 62 IN | DIASTOLIC BLOOD PRESSURE: 82 MMHG | HEART RATE: 81 BPM | OXYGEN SATURATION: 97 % | SYSTOLIC BLOOD PRESSURE: 128 MMHG | WEIGHT: 238 LBS | RESPIRATION RATE: 15 BRPM

## 2025-01-17 DIAGNOSIS — Z34.93 PRENATAL CARE IN THIRD TRIMESTER: Primary | ICD-10-CM

## 2025-01-17 PROCEDURE — 0502F SUBSEQUENT PRENATAL CARE: CPT | Performed by: ADVANCED PRACTICE MIDWIFE

## 2025-01-17 SDOH — ECONOMIC STABILITY: FOOD INSECURITY: WITHIN THE PAST 12 MONTHS, THE FOOD YOU BOUGHT JUST DIDN'T LAST AND YOU DIDN'T HAVE MONEY TO GET MORE.: NEVER TRUE

## 2025-01-17 SDOH — ECONOMIC STABILITY: FOOD INSECURITY: WITHIN THE PAST 12 MONTHS, YOU WORRIED THAT YOUR FOOD WOULD RUN OUT BEFORE YOU GOT MONEY TO BUY MORE.: NEVER TRUE

## 2025-01-17 ASSESSMENT — PATIENT HEALTH QUESTIONNAIRE - PHQ9
SUM OF ALL RESPONSES TO PHQ QUESTIONS 1-9: 2
SUM OF ALL RESPONSES TO PHQ9 QUESTIONS 1 & 2: 2
2. FEELING DOWN, DEPRESSED OR HOPELESS: SEVERAL DAYS
SUM OF ALL RESPONSES TO PHQ QUESTIONS 1-9: 2
SUM OF ALL RESPONSES TO PHQ QUESTIONS 1-9: 2
1. LITTLE INTEREST OR PLEASURE IN DOING THINGS: SEVERAL DAYS
SUM OF ALL RESPONSES TO PHQ QUESTIONS 1-9: 2

## 2025-01-17 NOTE — PROGRESS NOTES
SUSAN:  Lucia Gregory is a 29 y.o.  at 39w1d who presents for routine OB appointment        LMP  (LMP Unknown)   FHTs: 130s  FH: 38    GBS: Negative    Cervical exam: 2-3/50/-3    Subjective: Doing well, no complaints. Good FM. Denies vaginal bleeding, Leaking of fluid, regular contractions.    Discussed IOL, not yet scheduled will try to get scheduled for next week.     Third trimester precautions given.  Labor precautions reviewed.     Follow up in 1 weeks.     CHERYLE Ivy CNM

## 2025-01-17 NOTE — PROGRESS NOTES
Patient arrived to room with complaints of a sore throat x2-3 days.  She states that she is concerned she may have strep throat.    +FM, irregular and mild contractions  Desires cervical check today    GBS neg

## 2025-01-18 ENCOUNTER — OFFICE VISIT (OUTPATIENT)
Age: 30
End: 2025-01-18

## 2025-01-18 VITALS
BODY MASS INDEX: 44.24 KG/M2 | TEMPERATURE: 98.6 F | SYSTOLIC BLOOD PRESSURE: 123 MMHG | HEART RATE: 96 BPM | WEIGHT: 238 LBS | DIASTOLIC BLOOD PRESSURE: 85 MMHG | OXYGEN SATURATION: 97 % | RESPIRATION RATE: 18 BRPM

## 2025-01-18 DIAGNOSIS — J02.9 SORE THROAT: ICD-10-CM

## 2025-01-18 DIAGNOSIS — J02.0 STREPTOCOCCAL SORE THROAT: Primary | ICD-10-CM

## 2025-01-18 LAB
STREP PYOGENES DNA, POC: POSITIVE
VALID INTERNAL CONTROL, POC: YES

## 2025-01-18 RX ORDER — AMOXICILLIN 500 MG/1
500 CAPSULE ORAL 2 TIMES DAILY
Qty: 20 CAPSULE | Refills: 0 | Status: SHIPPED | OUTPATIENT
Start: 2025-01-18 | End: 2025-01-28

## 2025-01-18 NOTE — PROGRESS NOTES
Patient Name: Lucia Gregory   YOB: 1995   Patient Status: New patient,   Chief Complaint: Pharyngitis (C/o sore throat and cough. Sx 4 days.)      ____________________________________________________________________________________________    External Records Reviewed: None    Limitation to History: None    Outside Historian: None    SUBJECTIVE/OBJECTIVE:  Lucia Gregory is a 29 y.o. female presents with complaint of sore throat and dry cough.  Symptoms began 4 day(s) ago and are worsening since onset.  The patient denies fever, shortness of breath, and chest pain.  She reports she is tolerating oral fluids and secretions well. Patient reports she is 39 weeks pregnant. No other acute symptoms reported at this time.          PAST MEDICAL HISTORY:   Medical: Pt  has a past medical history of ADHD (attention deficit hyperactivity disorder), Anemia (2/17/2022), Anemia (02/17/2022), Anxiety, Anxiety, Borderline personality disorder (HCC), Borderline personality disorder (HCC), Chronic cystitis (02/17/2022), Cystitis (02/17/2022), Degeneration of intervertebral disc of lumbar region (2/17/2022), Depression, Esophageal reflux (02/17/2022), History of removal of left breast implant (09/2020), History of removal of right breast implant (09/2020), breast implants, bilateral (11/30/2017), Otitis media (02/17/2022), PCOS (polycystic ovarian syndrome), Polycystic disease, ovaries, Postpartum depression, Trauma, and Unable to get pregnant, female (12/10/2020).  Surgical: Pt  has a past surgical history that includes Breast surgery (07/2017); Breast surgery (2017); and removal intact breast implant (09/2020).  Family: Pt family history includes Anxiety Disorder in her mother; Bipolar Disorder in her mother; Brain Cancer in her maternal grandmother; Breast Cancer in her maternal aunt; Cancer in her mother; Colon Cancer in her paternal aunt; Depression in her mother; Diabetes in her paternal uncle; Heart Disease

## 2025-01-21 ENCOUNTER — APPOINTMENT (OUTPATIENT)
Facility: HOSPITAL | Age: 30
End: 2025-01-21
Payer: MEDICAID

## 2025-01-21 DIAGNOSIS — Z34.93 PRENATAL CARE IN THIRD TRIMESTER: Primary | ICD-10-CM

## 2025-01-21 NOTE — PROGRESS NOTES
Patient to be seen tomorrow in office for BPP and prenatal visit. Ultrasound order pended for signing.

## 2025-01-22 ENCOUNTER — HOSPITAL ENCOUNTER (INPATIENT)
Facility: HOSPITAL | Age: 30
LOS: 2 days | Discharge: HOME | End: 2025-01-24
Attending: OBSTETRICS & GYNECOLOGY | Admitting: OBSTETRICS & GYNECOLOGY
Payer: MEDICAID

## 2025-01-22 ENCOUNTER — DIRECT ADMIT ORDERS (OUTPATIENT)
Age: 30
End: 2025-01-22

## 2025-01-22 ENCOUNTER — TELEPHONE (OUTPATIENT)
Age: 30
End: 2025-01-22

## 2025-01-22 ENCOUNTER — ROUTINE PRENATAL (OUTPATIENT)
Age: 30
End: 2025-01-22

## 2025-01-22 VITALS
SYSTOLIC BLOOD PRESSURE: 123 MMHG | BODY MASS INDEX: 44.43 KG/M2 | WEIGHT: 239 LBS | DIASTOLIC BLOOD PRESSURE: 87 MMHG | HEART RATE: 80 BPM | OXYGEN SATURATION: 97 %

## 2025-01-22 DIAGNOSIS — Z34.93 PRENATAL CARE IN THIRD TRIMESTER: Primary | ICD-10-CM

## 2025-01-22 DIAGNOSIS — O36.8130 DECREASED FETAL MOVEMENTS IN THIRD TRIMESTER, SINGLE OR UNSPECIFIED FETUS: ICD-10-CM

## 2025-01-22 DIAGNOSIS — O36.8130 DECREASED FETAL MOVEMENTS IN THIRD TRIMESTER, SINGLE OR UNSPECIFIED FETUS: Primary | ICD-10-CM

## 2025-01-22 PROBLEM — Z34.90 ENCOUNTER FOR INDUCTION OF LABOR: Status: ACTIVE | Noted: 2025-01-22

## 2025-01-22 LAB
ABO + RH BLD: NORMAL
BASOPHILS # BLD: 0.02 K/UL (ref 0–0.1)
BASOPHILS NFR BLD: 0.2 % (ref 0–1)
BLOOD GROUP ANTIBODIES SERPL: NORMAL
DIFFERENTIAL METHOD BLD: ABNORMAL
EOSINOPHIL # BLD: 0.06 K/UL (ref 0–0.4)
EOSINOPHIL NFR BLD: 0.7 % (ref 0–7)
ERYTHROCYTE [DISTWIDTH] IN BLOOD BY AUTOMATED COUNT: 17.3 % (ref 11.5–14.5)
HCT VFR BLD AUTO: 30.9 % (ref 35–47)
HGB BLD-MCNC: 9.4 G/DL (ref 11.5–16)
IMM GRANULOCYTES # BLD AUTO: 0.05 K/UL (ref 0–0.04)
IMM GRANULOCYTES NFR BLD AUTO: 0.6 % (ref 0–0.5)
LYMPHOCYTES # BLD: 1.88 K/UL (ref 0.8–3.5)
LYMPHOCYTES NFR BLD: 22.2 % (ref 12–49)
MCH RBC QN AUTO: 22.7 PG (ref 26–34)
MCHC RBC AUTO-ENTMCNC: 30.4 G/DL (ref 30–36.5)
MCV RBC AUTO: 74.6 FL (ref 80–99)
MONOCYTES # BLD: 0.52 K/UL (ref 0–1)
MONOCYTES NFR BLD: 6.1 % (ref 5–13)
NEUTS SEG # BLD: 5.93 K/UL (ref 1.8–8)
NEUTS SEG NFR BLD: 70.2 % (ref 32–75)
NRBC # BLD: 0 K/UL (ref 0–0.01)
NRBC BLD-RTO: 0 PER 100 WBC
PLATELET # BLD AUTO: 255 K/UL (ref 150–400)
PMV BLD AUTO: 11.4 FL (ref 8.9–12.9)
RBC # BLD AUTO: 4.14 M/UL (ref 3.8–5.2)
SPECIMEN EXP DATE BLD: NORMAL
WBC # BLD AUTO: 8.5 K/UL (ref 3.6–11)

## 2025-01-22 PROCEDURE — 6370000000 HC RX 637 (ALT 250 FOR IP)

## 2025-01-22 PROCEDURE — 86850 RBC ANTIBODY SCREEN: CPT

## 2025-01-22 PROCEDURE — 0502F SUBSEQUENT PRENATAL CARE: CPT | Performed by: ADVANCED PRACTICE MIDWIFE

## 2025-01-22 PROCEDURE — 1100000000 HC RM PRIVATE

## 2025-01-22 PROCEDURE — 85025 COMPLETE CBC W/AUTO DIFF WBC: CPT

## 2025-01-22 PROCEDURE — 86592 SYPHILIS TEST NON-TREP QUAL: CPT

## 2025-01-22 PROCEDURE — 7210000100 HC LABOR FEE PER 1 HR

## 2025-01-22 PROCEDURE — 86900 BLOOD TYPING SEROLOGIC ABO: CPT

## 2025-01-22 PROCEDURE — 36415 COLL VENOUS BLD VENIPUNCTURE: CPT

## 2025-01-22 PROCEDURE — 59200 INSERT CERVICAL DILATOR: CPT

## 2025-01-22 PROCEDURE — 86901 BLOOD TYPING SEROLOGIC RH(D): CPT

## 2025-01-22 RX ORDER — NALBUPHINE HYDROCHLORIDE 10 MG/ML
10 INJECTION INTRAMUSCULAR; INTRAVENOUS; SUBCUTANEOUS
Status: CANCELLED | OUTPATIENT
Start: 2025-01-22

## 2025-01-22 RX ORDER — DIPHENHYDRAMINE HCL 25 MG
25 CAPSULE ORAL EVERY 4 HOURS PRN
Status: DISCONTINUED | OUTPATIENT
Start: 2025-01-22 | End: 2025-01-24 | Stop reason: HOSPADM

## 2025-01-22 RX ORDER — SODIUM CHLORIDE, SODIUM LACTATE, POTASSIUM CHLORIDE, CALCIUM CHLORIDE 600; 310; 30; 20 MG/100ML; MG/100ML; MG/100ML; MG/100ML
INJECTION, SOLUTION INTRAVENOUS CONTINUOUS
Status: DISCONTINUED | OUTPATIENT
Start: 2025-01-22 | End: 2025-01-24 | Stop reason: HOSPADM

## 2025-01-22 RX ORDER — MISOPROSTOL 100 UG/1
400 TABLET ORAL PRN
Status: CANCELLED | OUTPATIENT
Start: 2025-01-22

## 2025-01-22 RX ORDER — ACETAMINOPHEN 325 MG/1
650 TABLET ORAL EVERY 4 HOURS PRN
Status: CANCELLED | OUTPATIENT
Start: 2025-01-22

## 2025-01-22 RX ORDER — DIPHENHYDRAMINE HYDROCHLORIDE 50 MG/ML
25 INJECTION INTRAMUSCULAR; INTRAVENOUS EVERY 4 HOURS PRN
Status: DISCONTINUED | OUTPATIENT
Start: 2025-01-22 | End: 2025-01-24 | Stop reason: HOSPADM

## 2025-01-22 RX ORDER — AMOXICILLIN 500 MG/1
500 CAPSULE ORAL 2 TIMES DAILY
Status: DISCONTINUED | OUTPATIENT
Start: 2025-01-23 | End: 2025-01-24 | Stop reason: HOSPADM

## 2025-01-22 RX ORDER — SODIUM CHLORIDE 0.9 % (FLUSH) 0.9 %
5-40 SYRINGE (ML) INJECTION PRN
Status: CANCELLED | OUTPATIENT
Start: 2025-01-22

## 2025-01-22 RX ORDER — SODIUM CHLORIDE 9 MG/ML
INJECTION, SOLUTION INTRAVENOUS PRN
Status: DISCONTINUED | OUTPATIENT
Start: 2025-01-22 | End: 2025-01-24 | Stop reason: HOSPADM

## 2025-01-22 RX ORDER — METHYLERGONOVINE MALEATE 0.2 MG/ML
200 INJECTION INTRAVENOUS PRN
Status: CANCELLED | OUTPATIENT
Start: 2025-01-22

## 2025-01-22 RX ORDER — TERBUTALINE SULFATE 1 MG/ML
0.25 INJECTION, SOLUTION SUBCUTANEOUS
Status: ACTIVE | OUTPATIENT
Start: 2025-01-22 | End: 2025-01-23

## 2025-01-22 RX ORDER — DIPHENHYDRAMINE HYDROCHLORIDE 50 MG/ML
25 INJECTION INTRAMUSCULAR; INTRAVENOUS EVERY 4 HOURS PRN
Status: CANCELLED | OUTPATIENT
Start: 2025-01-22

## 2025-01-22 RX ORDER — ONDANSETRON 2 MG/ML
4 INJECTION INTRAMUSCULAR; INTRAVENOUS EVERY 6 HOURS PRN
Status: CANCELLED | OUTPATIENT
Start: 2025-01-22

## 2025-01-22 RX ORDER — MISOPROSTOL 200 UG/1
400 TABLET ORAL PRN
Status: DISCONTINUED | OUTPATIENT
Start: 2025-01-22 | End: 2025-01-24 | Stop reason: HOSPADM

## 2025-01-22 RX ORDER — SODIUM CHLORIDE 0.9 % (FLUSH) 0.9 %
5-40 SYRINGE (ML) INJECTION EVERY 12 HOURS SCHEDULED
Status: DISCONTINUED | OUTPATIENT
Start: 2025-01-22 | End: 2025-01-24 | Stop reason: HOSPADM

## 2025-01-22 RX ORDER — SODIUM CHLORIDE, SODIUM LACTATE, POTASSIUM CHLORIDE, AND CALCIUM CHLORIDE .6; .31; .03; .02 G/100ML; G/100ML; G/100ML; G/100ML
500 INJECTION, SOLUTION INTRAVENOUS PRN
Status: DISCONTINUED | OUTPATIENT
Start: 2025-01-22 | End: 2025-01-24 | Stop reason: HOSPADM

## 2025-01-22 RX ORDER — SODIUM CHLORIDE, SODIUM LACTATE, POTASSIUM CHLORIDE, AND CALCIUM CHLORIDE .6; .31; .03; .02 G/100ML; G/100ML; G/100ML; G/100ML
1000 INJECTION, SOLUTION INTRAVENOUS PRN
Status: CANCELLED | OUTPATIENT
Start: 2025-01-22

## 2025-01-22 RX ORDER — SODIUM CHLORIDE 9 MG/ML
INJECTION, SOLUTION INTRAVENOUS PRN
Status: CANCELLED | OUTPATIENT
Start: 2025-01-22

## 2025-01-22 RX ORDER — ONDANSETRON 4 MG/1
4 TABLET, ORALLY DISINTEGRATING ORAL EVERY 6 HOURS PRN
Status: DISCONTINUED | OUTPATIENT
Start: 2025-01-22 | End: 2025-01-24 | Stop reason: SDUPTHER

## 2025-01-22 RX ORDER — NALBUPHINE HYDROCHLORIDE 10 MG/ML
10 INJECTION INTRAMUSCULAR; INTRAVENOUS; SUBCUTANEOUS
Status: DISCONTINUED | OUTPATIENT
Start: 2025-01-22 | End: 2025-01-24 | Stop reason: HOSPADM

## 2025-01-22 RX ORDER — TERBUTALINE SULFATE 1 MG/ML
0.25 INJECTION, SOLUTION SUBCUTANEOUS
Status: CANCELLED | OUTPATIENT
Start: 2025-01-22 | End: 2025-01-23

## 2025-01-22 RX ORDER — METHYLERGONOVINE MALEATE 0.2 MG/ML
200 INJECTION INTRAVENOUS PRN
Status: DISCONTINUED | OUTPATIENT
Start: 2025-01-22 | End: 2025-01-24 | Stop reason: SDUPTHER

## 2025-01-22 RX ORDER — CARBOPROST TROMETHAMINE 250 UG/ML
250 INJECTION, SOLUTION INTRAMUSCULAR PRN
Status: DISCONTINUED | OUTPATIENT
Start: 2025-01-22 | End: 2025-01-24 | Stop reason: SDUPTHER

## 2025-01-22 RX ORDER — FENTANYL CITRATE 50 UG/ML
100 INJECTION, SOLUTION INTRAMUSCULAR; INTRAVENOUS ONCE
Status: DISCONTINUED | OUTPATIENT
Start: 2025-01-22 | End: 2025-01-24 | Stop reason: HOSPADM

## 2025-01-22 RX ORDER — ONDANSETRON 2 MG/ML
4 INJECTION INTRAMUSCULAR; INTRAVENOUS EVERY 6 HOURS PRN
Status: DISCONTINUED | OUTPATIENT
Start: 2025-01-22 | End: 2025-01-24 | Stop reason: SDUPTHER

## 2025-01-22 RX ORDER — SODIUM CHLORIDE 0.9 % (FLUSH) 0.9 %
5-40 SYRINGE (ML) INJECTION PRN
Status: DISCONTINUED | OUTPATIENT
Start: 2025-01-22 | End: 2025-01-24 | Stop reason: HOSPADM

## 2025-01-22 RX ORDER — SODIUM CHLORIDE, SODIUM LACTATE, POTASSIUM CHLORIDE, AND CALCIUM CHLORIDE .6; .31; .03; .02 G/100ML; G/100ML; G/100ML; G/100ML
1000 INJECTION, SOLUTION INTRAVENOUS PRN
Status: DISCONTINUED | OUTPATIENT
Start: 2025-01-22 | End: 2025-01-24 | Stop reason: HOSPADM

## 2025-01-22 RX ORDER — SODIUM CHLORIDE 0.9 % (FLUSH) 0.9 %
5-40 SYRINGE (ML) INJECTION EVERY 12 HOURS SCHEDULED
Status: CANCELLED | OUTPATIENT
Start: 2025-01-22

## 2025-01-22 RX ORDER — ONDANSETRON 4 MG/1
4 TABLET, ORALLY DISINTEGRATING ORAL EVERY 6 HOURS PRN
Status: CANCELLED | OUTPATIENT
Start: 2025-01-22

## 2025-01-22 RX ORDER — SODIUM CHLORIDE, SODIUM LACTATE, POTASSIUM CHLORIDE, CALCIUM CHLORIDE 600; 310; 30; 20 MG/100ML; MG/100ML; MG/100ML; MG/100ML
INJECTION, SOLUTION INTRAVENOUS CONTINUOUS
Status: CANCELLED | OUTPATIENT
Start: 2025-01-22

## 2025-01-22 RX ORDER — CARBOPROST TROMETHAMINE 250 UG/ML
250 INJECTION, SOLUTION INTRAMUSCULAR PRN
Status: CANCELLED | OUTPATIENT
Start: 2025-01-22

## 2025-01-22 RX ORDER — ACETAMINOPHEN 325 MG/1
650 TABLET ORAL EVERY 4 HOURS PRN
Status: DISCONTINUED | OUTPATIENT
Start: 2025-01-22 | End: 2025-01-24 | Stop reason: SDUPTHER

## 2025-01-22 RX ORDER — FENTANYL CITRATE 50 UG/ML
100 INJECTION, SOLUTION INTRAMUSCULAR; INTRAVENOUS ONCE
Status: CANCELLED | OUTPATIENT
Start: 2025-01-22 | End: 2025-01-22

## 2025-01-22 RX ORDER — DIPHENHYDRAMINE HCL 25 MG
25 TABLET ORAL EVERY 4 HOURS PRN
Status: CANCELLED | OUTPATIENT
Start: 2025-01-22

## 2025-01-22 RX ORDER — SODIUM CHLORIDE, SODIUM LACTATE, POTASSIUM CHLORIDE, AND CALCIUM CHLORIDE .6; .31; .03; .02 G/100ML; G/100ML; G/100ML; G/100ML
500 INJECTION, SOLUTION INTRAVENOUS PRN
Status: CANCELLED | OUTPATIENT
Start: 2025-01-22

## 2025-01-22 RX ORDER — AMOXICILLIN 500 MG/1
500 CAPSULE ORAL ONCE
Status: COMPLETED | OUTPATIENT
Start: 2025-01-22 | End: 2025-01-22

## 2025-01-22 RX ADMIN — Medication 25 MCG: at 20:00

## 2025-01-22 RX ADMIN — AMOXICILLIN 500 MG: 500 CAPSULE ORAL at 20:56

## 2025-01-22 NOTE — TELEPHONE ENCOUNTER
Patient called, name and  verified. Patient is calling c/o decreased FM. She reports having \"3 movements since 8:00 am\".    Per midwife Shalom, she would like for the pt to try laying still, drinking something cold or eating something sweet to jar baby a little and keep her schedule US and appt for early this afternoon.    Pt reports she has tried these things but will be in for her appt. She is currently a  and 39w6d. Age is 29. Pt denies any additional sx.

## 2025-01-22 NOTE — PROGRESS NOTES
No fetal movement this morning.  Pt taking amox for strep.  US today.  Message sent to Luz Marina for IOL Tuesday.

## 2025-01-22 NOTE — PROGRESS NOTES
SUSAN:  Lucia Gregory is a 29 y.o.  at 39w6d who presents for routine OB appointment    US today  Lidia Foreman on 2025  2:02 PM EST   Limited OB scan     A single Vertex 39W6D IUP is seen. Fetal cardiac motion observed.     Limited anatomy was visualized and appears wnl.      Biophysical Profile:   Fetal Tone- 2 /2  Fetal Movement- 2 /2  Fetal Breathing- 2 /2   CHYNA- 2 /2     BPP- 8 / 8        FHR- 153bpm     CHYNA= 13.7CM     Anterior placenta appears wnl.       /87   Pulse 80   Wt 108.4 kg (239 lb)   LMP  (LMP Unknown)   SpO2 97%   BMI 44.43 kg/m²   FHTs: US      GBS: Negative    Cervical exam: 3/50/-    Subjective: Doing well, no complaints. Decreased FM. Denies vaginal bleeding, Leaking of fluid, regular contractions.    Discussed R/B of waiting, pt agreeable to IOL today,   Will send to L/D Charge notified.     Third trimester precautions given.  Labor precautions reviewed.     Follow up in [facility] today.     CHERYLE Ivy - LEONILA

## 2025-01-22 NOTE — TELEPHONE ENCOUNTER
Called patient to notify of need for her to go to hospital for evaluation due to decreased fetal movement.  No answer.

## 2025-01-22 NOTE — PROGRESS NOTES
Labor Progress Note  Patient seen, fetal heart rate and contraction pattern evaluated, patient examined.  Temp 98.2 °F (36.8 °C)   Ht 1.549 m (5' 1\")   Wt 108.4 kg (239 lb)   LMP  (LMP Unknown)   BMI 45.16 kg/m²     Physical Exam:  Cervical Exam:  Deferred, 3-4cm and vertex in office  Membranes:  Intact  Uterine Activity: Occ, lasting  seconds  Fetal Heart Rate: Baseline: 140 per minute  Variability: moderate  Accelerations: yes  Decelerations: none    Assessment/Plan:  Reassuring fetal status, Continue plan for vaginal delivery.  CE 3-4cm in office.  Declines pit, opts for miso.  Plan for miso, will reassess after two doses pending response.   Cat I tracing.   May have epidural prn.     Shalom Lopez, APRN - CNM

## 2025-01-22 NOTE — H&P
Cancer Mother         skin,thyroid,ovarian    Migraines Mother     Depression Mother     Anxiety Disorder Mother     Bipolar Disorder Mother     Heart Disease Father     Hypertension Father     Other Father         prediabetes    No Known Problems Sister     Other Brother         esbergers    Breast Cancer Maternal Aunt     Colon Cancer Paternal Aunt     Diabetes Paternal Uncle     Brain Cancer Maternal Grandmother        No Known Allergies  Prior to Admission medications    Medication Sig Start Date End Date Taking? Authorizing Provider   amoxicillin (AMOXIL) 500 MG capsule Take 1 capsule by mouth 2 times daily for 10 days 1/18/25 1/28/25  Paige Abreu PA-C   Ferrous Sulfate (IRON) 325 (65 Fe) MG TABS Take 1 tablet by mouth in the morning and at bedtime  Patient not taking: Reported on 1/15/2025 12/30/24 3/30/25  Janette Mendoza APRN - CNM   metFORMIN (GLUCOPHAGE-XR) 500 MG extended release tablet Take 1 tablet by mouth in the morning and at bedtime  Patient not taking: Reported on 1/17/2025 12/30/24 6/28/25  Janette Mendoza APRN - CNM   docusate sodium (COLACE) 100 MG capsule Take 1 capsule by mouth 2 times daily 11/29/24 1/28/25  Janette Mendoza APRN - CNM   Continuous Glucose Monitor Sup KIT 1 Box by Does not apply route every 4 hours  Patient not taking: Reported on 11/12/2024 10/15/24   Ruma Miller APRN - CNM   Lancets MISC 1 each by Does not apply route daily  Patient not taking: Reported on 11/12/2024 10/11/24   Janette Mendoza APRN - CNM   blood glucose monitor strips Test 4 times a day & as needed for symptoms of irregular blood glucose. Dispense sufficient amount for indicated testing frequency plus additional to accommodate PRN testing needs.  Patient not taking: Reported on 11/12/2024 10/11/24   Janette Mendoza APRN - CNM        Review of Systems: Constitutional: negative  Ears, nose, mouth, throat, and face: negative  Respiratory: negative  Cardiovascular: negative  Gastrointestinal:

## 2025-01-23 ENCOUNTER — ANESTHESIA (OUTPATIENT)
Facility: HOSPITAL | Age: 30
End: 2025-01-23
Payer: MEDICAID

## 2025-01-23 ENCOUNTER — ANESTHESIA EVENT (OUTPATIENT)
Facility: HOSPITAL | Age: 30
End: 2025-01-23
Payer: MEDICAID

## 2025-01-23 ENCOUNTER — HOSPITAL ENCOUNTER (OUTPATIENT)
Facility: HOSPITAL | Age: 30
Setting detail: INFUSION SERIES
End: 2025-01-23

## 2025-01-23 PROBLEM — Z34.90 ENCOUNTER FOR INDUCTION OF LABOR: Status: RESOLVED | Noted: 2025-01-22 | Resolved: 2025-01-23

## 2025-01-23 LAB — RPR SER QL: NONREACTIVE

## 2025-01-23 PROCEDURE — 7220000101 HC DELIVERY VAGINAL/SINGLE

## 2025-01-23 PROCEDURE — 6370000000 HC RX 637 (ALT 250 FOR IP)

## 2025-01-23 PROCEDURE — 6360000002 HC RX W HCPCS: Performed by: SURGERY

## 2025-01-23 PROCEDURE — 7210000100 HC LABOR FEE PER 1 HR

## 2025-01-23 PROCEDURE — 2580000003 HC RX 258: Performed by: ADVANCED PRACTICE MIDWIFE

## 2025-01-23 PROCEDURE — 2500000003 HC RX 250 WO HCPCS: Performed by: SURGERY

## 2025-01-23 PROCEDURE — 7100000001 HC PACU RECOVERY - ADDTL 15 MIN

## 2025-01-23 PROCEDURE — APPNB15 APP NON BILLABLE TIME 0-15 MINS: Performed by: ADVANCED PRACTICE MIDWIFE

## 2025-01-23 PROCEDURE — 3700000025 EPIDURAL BLOCK: Performed by: ANESTHESIOLOGY

## 2025-01-23 PROCEDURE — 51701 INSERT BLADDER CATHETER: CPT

## 2025-01-23 PROCEDURE — 6370000000 HC RX 637 (ALT 250 FOR IP): Performed by: ADVANCED PRACTICE MIDWIFE

## 2025-01-23 PROCEDURE — 2500000003 HC RX 250 WO HCPCS

## 2025-01-23 PROCEDURE — 2580000003 HC RX 258

## 2025-01-23 PROCEDURE — 7100000000 HC PACU RECOVERY - FIRST 15 MIN

## 2025-01-23 PROCEDURE — 1120000000 HC RM PRIVATE OB

## 2025-01-23 PROCEDURE — APPNB60 APP NON BILLABLE TIME 46-60 MINS: Performed by: ADVANCED PRACTICE MIDWIFE

## 2025-01-23 PROCEDURE — 59400 OBSTETRICAL CARE: CPT | Performed by: ADVANCED PRACTICE MIDWIFE

## 2025-01-23 PROCEDURE — 6360000002 HC RX W HCPCS: Performed by: ADVANCED PRACTICE MIDWIFE

## 2025-01-23 RX ORDER — ONDANSETRON 2 MG/ML
4 INJECTION INTRAMUSCULAR; INTRAVENOUS EVERY 6 HOURS PRN
Status: DISCONTINUED | OUTPATIENT
Start: 2025-01-23 | End: 2025-01-23 | Stop reason: SDUPTHER

## 2025-01-23 RX ORDER — ACETAMINOPHEN 500 MG
1000 TABLET ORAL EVERY 8 HOURS PRN
Status: DISCONTINUED | OUTPATIENT
Start: 2025-01-23 | End: 2025-01-24 | Stop reason: HOSPADM

## 2025-01-23 RX ORDER — METFORMIN HYDROCHLORIDE 500 MG/1
500 TABLET, EXTENDED RELEASE ORAL 2 TIMES DAILY
Status: DISCONTINUED | OUTPATIENT
Start: 2025-01-23 | End: 2025-01-24 | Stop reason: HOSPADM

## 2025-01-23 RX ORDER — MISOPROSTOL 200 UG/1
200 TABLET ORAL PRN
Status: DISCONTINUED | OUTPATIENT
Start: 2025-01-23 | End: 2025-01-24 | Stop reason: HOSPADM

## 2025-01-23 RX ORDER — NALOXONE HYDROCHLORIDE 0.4 MG/ML
INJECTION, SOLUTION INTRAMUSCULAR; INTRAVENOUS; SUBCUTANEOUS PRN
Status: DISCONTINUED | OUTPATIENT
Start: 2025-01-23 | End: 2025-01-24 | Stop reason: HOSPADM

## 2025-01-23 RX ORDER — MODIFIED LANOLIN
OINTMENT (GRAM) TOPICAL PRN
Status: DISCONTINUED | OUTPATIENT
Start: 2025-01-23 | End: 2025-01-24 | Stop reason: HOSPADM

## 2025-01-23 RX ORDER — CARBOPROST TROMETHAMINE 250 UG/ML
250 INJECTION, SOLUTION INTRAMUSCULAR PRN
Status: DISCONTINUED | OUTPATIENT
Start: 2025-01-23 | End: 2025-01-24 | Stop reason: HOSPADM

## 2025-01-23 RX ORDER — LIDOCAINE HCL/EPINEPHRINE/PF 2%-1:200K
VIAL (ML) INJECTION
Status: COMPLETED
Start: 2025-01-23 | End: 2025-01-23

## 2025-01-23 RX ORDER — FENTANYL CITRATE 50 UG/ML
INJECTION, SOLUTION INTRAMUSCULAR; INTRAVENOUS
Status: DISCONTINUED | OUTPATIENT
Start: 2025-01-23 | End: 2025-01-23 | Stop reason: SDUPTHER

## 2025-01-23 RX ORDER — BUPIVACAINE HYDROCHLORIDE 2.5 MG/ML
INJECTION, SOLUTION EPIDURAL; INFILTRATION; INTRACAUDAL
Status: COMPLETED
Start: 2025-01-23 | End: 2025-01-23

## 2025-01-23 RX ORDER — METHYLERGONOVINE MALEATE 0.2 MG/ML
200 INJECTION INTRAVENOUS PRN
Status: DISCONTINUED | OUTPATIENT
Start: 2025-01-23 | End: 2025-01-24 | Stop reason: HOSPADM

## 2025-01-23 RX ORDER — IBUPROFEN 400 MG/1
800 TABLET, FILM COATED ORAL EVERY 8 HOURS SCHEDULED
Status: DISCONTINUED | OUTPATIENT
Start: 2025-01-23 | End: 2025-01-24 | Stop reason: HOSPADM

## 2025-01-23 RX ORDER — BUPIVACAINE HYDROCHLORIDE 2.5 MG/ML
INJECTION, SOLUTION EPIDURAL; INFILTRATION; INTRACAUDAL
Status: DISCONTINUED | OUTPATIENT
Start: 2025-01-23 | End: 2025-01-23

## 2025-01-23 RX ORDER — LIDOCAINE HCL/EPINEPHRINE/PF 2%-1:200K
VIAL (ML) INJECTION
Status: DISCONTINUED | OUTPATIENT
Start: 2025-01-23 | End: 2025-01-23 | Stop reason: SDUPTHER

## 2025-01-23 RX ORDER — FERROUS SULFATE 325(65) MG
325 TABLET ORAL 2 TIMES DAILY WITH MEALS
Status: DISCONTINUED | OUTPATIENT
Start: 2025-01-23 | End: 2025-01-24 | Stop reason: HOSPADM

## 2025-01-23 RX ORDER — MISOPROSTOL 200 UG/1
800 TABLET ORAL PRN
Status: DISCONTINUED | OUTPATIENT
Start: 2025-01-23 | End: 2025-01-24 | Stop reason: HOSPADM

## 2025-01-23 RX ORDER — BUPIVACAINE HYDROCHLORIDE 2.5 MG/ML
INJECTION, SOLUTION EPIDURAL; INFILTRATION; INTRACAUDAL
Status: DISCONTINUED | OUTPATIENT
Start: 2025-01-23 | End: 2025-01-23 | Stop reason: SDUPTHER

## 2025-01-23 RX ORDER — ONDANSETRON 2 MG/ML
4 INJECTION INTRAMUSCULAR; INTRAVENOUS EVERY 6 HOURS PRN
Status: DISCONTINUED | OUTPATIENT
Start: 2025-01-23 | End: 2025-01-24 | Stop reason: HOSPADM

## 2025-01-23 RX ORDER — SIMETHICONE 80 MG
80 TABLET,CHEWABLE ORAL EVERY 6 HOURS PRN
Status: DISCONTINUED | OUTPATIENT
Start: 2025-01-23 | End: 2025-01-24 | Stop reason: HOSPADM

## 2025-01-23 RX ORDER — EPHEDRINE SULFATE 50 MG/ML
5 INJECTION INTRAVENOUS PRN
Status: DISCONTINUED | OUTPATIENT
Start: 2025-01-24 | End: 2025-01-24 | Stop reason: HOSPADM

## 2025-01-23 RX ORDER — FENTANYL/BUPIVACAINE/NS/PF 2-1250MCG
1-15 PLASTIC BAG, INJECTION (ML) INJECTION CONTINUOUS
Status: DISCONTINUED | OUTPATIENT
Start: 2025-01-23 | End: 2025-01-24 | Stop reason: HOSPADM

## 2025-01-23 RX ORDER — CALCIUM CARBONATE 500 MG/1
1000 TABLET, CHEWABLE ORAL 3 TIMES DAILY PRN
Status: DISCONTINUED | OUTPATIENT
Start: 2025-01-23 | End: 2025-01-24 | Stop reason: HOSPADM

## 2025-01-23 RX ORDER — OXYTOCIN 10 [USP'U]/ML
INJECTION, SOLUTION INTRAMUSCULAR; INTRAVENOUS
Status: DISCONTINUED
Start: 2025-01-23 | End: 2025-01-23 | Stop reason: WASHOUT

## 2025-01-23 RX ORDER — EPHEDRINE SULFATE 50 MG/ML
10 INJECTION INTRAVENOUS
Status: DISPENSED | OUTPATIENT
Start: 2025-01-23 | End: 2025-01-24

## 2025-01-23 RX ORDER — NALBUPHINE HYDROCHLORIDE 10 MG/ML
5 INJECTION INTRAMUSCULAR; INTRAVENOUS; SUBCUTANEOUS EVERY 4 HOURS PRN
Status: DISCONTINUED | OUTPATIENT
Start: 2025-01-23 | End: 2025-01-24 | Stop reason: HOSPADM

## 2025-01-23 RX ORDER — ONDANSETRON 4 MG/1
4 TABLET, ORALLY DISINTEGRATING ORAL EVERY 6 HOURS PRN
Status: DISCONTINUED | OUTPATIENT
Start: 2025-01-23 | End: 2025-01-24 | Stop reason: HOSPADM

## 2025-01-23 RX ORDER — DOCUSATE SODIUM 100 MG/1
100 CAPSULE, LIQUID FILLED ORAL 2 TIMES DAILY
Status: DISCONTINUED | OUTPATIENT
Start: 2025-01-23 | End: 2025-01-24 | Stop reason: HOSPADM

## 2025-01-23 RX ADMIN — AMOXICILLIN 500 MG: 500 CAPSULE ORAL at 09:52

## 2025-01-23 RX ADMIN — Medication 25 MCG: at 00:37

## 2025-01-23 RX ADMIN — CALCIUM CARBONATE (ANTACID) CHEW TAB 500 MG 1000 MG: 500 CHEW TAB at 00:40

## 2025-01-23 RX ADMIN — DIPHENHYDRAMINE HYDROCHLORIDE 25 MG: 25 CAPSULE ORAL at 00:42

## 2025-01-23 RX ADMIN — AMOXICILLIN 500 MG: 500 CAPSULE ORAL at 20:51

## 2025-01-23 RX ADMIN — Medication 10 ML/HR: at 04:31

## 2025-01-23 RX ADMIN — IBUPROFEN 800 MG: 400 TABLET, FILM COATED ORAL at 15:41

## 2025-01-23 RX ADMIN — Medication 10 ML/HR: at 11:29

## 2025-01-23 RX ADMIN — FERROUS SULFATE TAB 325 MG (65 MG ELEMENTAL FE) 325 MG: 325 (65 FE) TAB at 18:00

## 2025-01-23 RX ADMIN — DOCUSATE SODIUM 100 MG: 100 CAPSULE, LIQUID FILLED ORAL at 20:51

## 2025-01-23 RX ADMIN — LIDOCAINE HYDROCHLORIDE,EPINEPHRINE BITARTRATE 3 ML: 20; .005 INJECTION, SOLUTION EPIDURAL; INFILTRATION; INTRACAUDAL; PERINEURAL at 04:13

## 2025-01-23 RX ADMIN — OXYTOCIN 909 MILLI-UNITS/MIN: 10 INJECTION, SOLUTION INTRAMUSCULAR; INTRAVENOUS at 12:58

## 2025-01-23 RX ADMIN — FAMOTIDINE 20 MG: 10 INJECTION, SOLUTION INTRAVENOUS at 03:50

## 2025-01-23 RX ADMIN — SODIUM CHLORIDE, POTASSIUM CHLORIDE, SODIUM LACTATE AND CALCIUM CHLORIDE 1000 ML: 600; 310; 30; 20 INJECTION, SOLUTION INTRAVENOUS at 03:19

## 2025-01-23 RX ADMIN — FENTANYL CITRATE 100 MCG: 50 INJECTION INTRAMUSCULAR; INTRAVENOUS at 04:14

## 2025-01-23 RX ADMIN — METFORMIN HYDROCHLORIDE 500 MG: 500 TABLET, EXTENDED RELEASE ORAL at 20:51

## 2025-01-23 RX ADMIN — Medication 25 MCG: at 07:21

## 2025-01-23 RX ADMIN — BUPIVACAINE HYDROCHLORIDE 6 ML: 2.5 INJECTION, SOLUTION EPIDURAL; INFILTRATION; INTRACAUDAL; PERINEURAL at 04:14

## 2025-01-23 RX ADMIN — SODIUM CHLORIDE, POTASSIUM CHLORIDE, SODIUM LACTATE AND CALCIUM CHLORIDE: 600; 310; 30; 20 INJECTION, SOLUTION INTRAVENOUS at 11:46

## 2025-01-23 ASSESSMENT — PAIN SCALES - GENERAL
PAINLEVEL_OUTOF10: 4
PAINLEVEL_OUTOF10: 1

## 2025-01-23 ASSESSMENT — PAIN DESCRIPTION - DESCRIPTORS: DESCRIPTORS: SORE

## 2025-01-23 ASSESSMENT — PAIN DESCRIPTION - LOCATION: LOCATION: PERINEUM

## 2025-01-23 ASSESSMENT — PAIN DESCRIPTION - ORIENTATION: ORIENTATION: LOWER

## 2025-01-23 NOTE — ANESTHESIA PRE PROCEDURE
Department of Anesthesiology  Preprocedure Note       Name:  Lucia Gregory   Age:  29 y.o.  :  1995                                          MRN:  [id number]         Date:  2025      Surgeon: * No surgeons listed *    Procedure: * No procedures listed *    Medications prior to admission:   Prior to Admission medications    Medication Sig Start Date End Date Taking? Authorizing Provider   amoxicillin (AMOXIL) 500 MG capsule Take 1 capsule by mouth 2 times daily for 10 days 25 Yes Paige Abreu PA-C   metFORMIN (GLUCOPHAGE-XR) 500 MG extended release tablet Take 1 tablet by mouth in the morning and at bedtime 24 Yes Janette Mendoza APRN - CNM   Ferrous Sulfate (IRON) 325 (65 Fe) MG TABS Take 1 tablet by mouth in the morning and at bedtime 12/30/24 3/30/25  Janette Mendoza APRN - CNM   docusate sodium (COLACE) 100 MG capsule Take 1 capsule by mouth 2 times daily  Patient not taking: Reported on 24  Janette Mendoza APRN - CNM   Continuous Glucose Monitor Sup KIT 1 Box by Does not apply route every 4 hours  Patient not taking: Reported on 2024 10/15/24   Ruma Miller APRN - CNM   Lancets MISC 1 each by Does not apply route daily  Patient not taking: Reported on 2024 10/11/24   Janette Mendoza APRN - CNM   blood glucose monitor strips Test 4 times a day & as needed for symptoms of irregular blood glucose. Dispense sufficient amount for indicated testing frequency plus additional to accommodate PRN testing needs.  Patient not taking: Reported on 2024 10/11/24   Janette Mendoza APRN - CNM       Current medications:    Current Facility-Administered Medications   Medication Dose Route Frequency Provider Last Rate Last Admin   • calcium carbonate (TUMS) chewable tablet 1,000 mg  1,000 mg Oral TID PRN Shalom Lopez APRN - CNM   1,000 mg at 25 0040   • famotidine (PEPCID) 20 mg in sodium chloride (PF) 0.9 % 10 mL injection  20 mg

## 2025-01-23 NOTE — PROGRESS NOTES
1935 Report received from JOS Khan Rn    2000 cytotec started at 2000 1/23/25  0100 Sleeping  0320 requesting epidural no sign of acute discomfort noted. VANCE ANGM will be coming to rupture her membranes this morning and pt doesn't want to feel anything.    0405 Dr Barbour at bedside to place epidural  0413 epidural placed by Dr Barbour pt tolerated procedure well.  0512 VANCE Lopez CNM called report given pt has epidural and sleeping.

## 2025-01-23 NOTE — PROGRESS NOTES
Labor Progress Note  Patient seen, fetal heart rate and contraction pattern evaluated, patient examined.  /86   Pulse 78   Temp 98.2 °F (36.8 °C) (Oral)   Resp 16   Ht 1.549 m (5' 1\")   Wt 108.4 kg (239 lb)   LMP  (LMP Unknown)   SpO2 98%   BMI 45.16 kg/m²     Physical Exam:  Cervical Exam:  Deferred   Membranes:  Intact  Uterine Activity: Frequency: Every 2-5 minutes, Duration:  seconds, and Intensity: mild  Fetal Heart Rate: Baseline: 150 per minute  Variability: moderate  Accelerations: yes  Decelerations: none    Assessment/Plan:  Reassuring fetal status, Continue plan for vaginal delivery.  CE deferred.   Post two doses miso, received last dose at 0100.   Plan to reassess in 4 hours.  Pt desires epidural.  Per Kelly KEEN, cervical reasonable for AROM in office, cytotec to assist with natural labor curve.    Shalom Lopez, APRN - CNM

## 2025-01-23 NOTE — ANESTHESIA POSTPROCEDURE EVALUATION
Post-Anesthesia Evaluation and Assessment    Patient: Lucia Gregory MRN: [id number]  SSN: [id number]    YOB: 1995  Age: 29 y.o.  Sex: female      Patient is status post Labor Epidural.     Cardiovascular Function/Vital Signs  Visit Vitals  /87   Pulse 72   Temp 98.1 °F (36.7 °C) (Oral)   Resp 16   Ht 1.549 m (5' 1\")   Wt 108.4 kg (239 lb)   SpO2 98%   BMI 45.16 kg/m²       Complications related to anesthesia: None    Post-anesthesia assessment completed. No concerns    Signed By: Corey Pathak MD     January 23, 2025

## 2025-01-23 NOTE — PROGRESS NOTES
1747 G 3 P 2 pt of KADY Lopez CNM admitted to labor and delivery # 9 for induction of labor for decreased fetal movement. Pt states that she has PCOS and anemia. States that she takes metformin and has received two iron infusions. Also reports that she is on Amoxicillin for Strep throat  1900 KADY Lopez CNM in to see patient. Discussing plan of care. States that pt was 4cm in office today. Plan is to administer Cytotec and avoid Pitocin if possible per pt correct.  1910 IV started in left hand on second attempt. Labs obtained and sent to lab  1930 Bedside and Verbal shift change report given to SEJAL Hu RN (oncoming nurse) by RUPERT Khan RN (offgoing nurse). Report included the following information Nurse Handoff Report, MAR, Recent Results, and Event Log.

## 2025-01-23 NOTE — ANESTHESIA PROCEDURE NOTES
Epidural Block    Patient location during procedure: OB  Start time: 1/23/2025 4:04 AM  End time: 1/23/2025 4:18 AM  Reason for block: labor epidural  Staffing  Performed: anesthesiologist   Anesthesiologist: Miguel A Barbour MD  Performed by: Miguel A Barbour MD  Authorized by: Miguel A Barbour MD    Epidural  Patient position: sitting  Prep: DuraPrep  Patient monitoring: cardiac monitor, continuous pulse ox and frequent blood pressure checks  Approach: midline  Location: L3-4  Injection technique: MIGUEL A saline and MIGUEL A air  Provider prep: mask and sterile gloves  Needle  Needle type: Tuohy   Needle gauge: 17 G  Needle length: 3.5 in  Needle insertion depth: 6 cm  Catheter type: end hole  Catheter size: 19 G  Catheter at skin depth: 10 cm  Test dose: negativeCatheter Secured: tegaderm and tape  Assessment  Hemodynamics: stable  Attempts: 1  Outcomes: uncomplicated and patient tolerated procedure well  Preanesthetic Checklist  Completed: patient identified, IV checked, site marked, risks and benefits discussed, surgical/procedural consents, equipment checked, pre-op evaluation, timeout performed, anesthesia consent given, oxygen available, monitors applied/VS acknowledged and fire risk safety assessment completed and verbalized

## 2025-01-23 NOTE — PROGRESS NOTES
Labor Progress Note  Patient seen, fetal heart rate and contraction pattern evaluated, patient examined.  /70   Pulse 55   Temp 98.4 °F (36.9 °C) (Oral)   Resp 16   Ht 1.549 m (5' 1\")   Wt 108.4 kg (239 lb)   LMP  (LMP Unknown)   SpO2 96%   BMI 45.16 kg/m²     Physical Exam:  Cervical Exam:  4 cm dilated    50% effaced    -2 station    Presenting Part: cephalic  Membranes:  Artificial Rupture of Membranes; Amniotic Fluid: small amount of clear fluid  Uterine Activity: Frequency: Occ, Duration:  seconds, and Intensity: mild  Fetal Heart Rate: Baseline: 130 per minute  Variability: moderate  Accelerations: yes  Decelerations: none    Assessment/Plan:  Reassuring fetal status, Continue plan for vaginal delivery.  CE 4/60/-3  Cat I  Resting comfortably with epidural  Offered pit vs AROM. Pt opts for AROM-r/b/a reviewed. AROM for small amount of clear fluid.  May consider additional miso in one hour if cxns do not increase.  Will reassess in 3-4 hours or sooner prn.     Shalom Lopez, CHERYLE - CNM

## 2025-01-23 NOTE — PROGRESS NOTES
Labor Progress Note  Patient seen, fetal heart rate and contraction pattern evaluated,    Labor Hx:  IOL for decreased FM  S/P 3 doses of Cytotec,     Fetal Surveillance   Mode: External US  Baseline Rate: 125 bpm  Variability: Moderate  Pattern: Accelerations  Patient Feels Fetal Movement: Yes  Multiple birth?: No  FHR A Comments: Pt feels some fetal movement but not all. Fetus very active  Baseline Rate: 125 bpm  Variability: Moderate    Contractions  Contraction Frequency: 3-7  Contraction Duration: 50       Maternal VS  /81   Pulse 73   Temp 98.7 °F (37.1 °C) (Oral)   Resp 16   Ht 1.549 m (5' 1\")   Wt 108.4 kg (239 lb)   LMP  (LMP Unknown)   SpO2 96%   BMI 45.16 kg/m²     CE:  Deferred     Assessment/Plan:  -Reassuring fetal status  -no contraction change  - Continuous monitoring  -will start pitocin at 11am if not regular UC   -Frequent position changes  -Anticipate , C/S if indicated  -Discussed with pt and family, agree    CHERYLE Ivy CNM   2025 8:27 AM

## 2025-01-23 NOTE — L&D DELIVERY NOTE
NAN Gregory [[id number]]      Labor Events     Labor: No   Steroids: None  Cervical Ripening Date/Time:        Rupture Date/Time:      Rupture Type: AROM, Intact  Fluid Color: Clear  Labor Complications: None       Anesthesia    Method: Epidural       Labor Event Times      Labor onset date/time:        Dilation complete date/time:  25 12:32:00 EST     Start pushing date/time:     Decision date/time (emergent ):            Delivery Details      Delivery Date: 25 Delivery Time: 12:45:00   Delivery Type: Vaginal, Spontaneous               Presentation    Presentation: Vertex  Position: Left  _: Occiput  _: Anterior       Shoulder Dystocia    Shoulder Dystocia Present?: No       Assisted Delivery Details    Forceps Attempted?: No  Vacuum Extractor Attempted?: No                           Cord    Vessels: 3 Vessels  Complications: Nuchal Tight  Cord Around: Head  Delayed Cord Clamping?: Yes  Cord Clamped Date/Time: 2025 12:57:47  Cord Blood Disposition: Discard  Gases Sent?: No              Placenta    Date/Time: 2025 12:58:52  Removal: Spontaneous  Appearance: Intact  Disposition: Family       Lacerations    Episiotomy: None  Perineal Lacerations: None  Other Lacerations: no non-perineal laceration  Number of Repair Packets: 0       Vaginal Counts    Initial Count Personnel: JOS DAVIS  Initial Count Verified By: VANCE BECERRA  Intial Sponge Count: Correct Intial Needles Count: Correct Intial Instruments Count: Correct   Final Sponges Count: Correct Final Needles  Count: Correct Final Instruments Count: Correct   Final Count Personnel: MARISA STRINGER  Final Count Verified By: STEVIE BLUM  Accurate Final Count?: Yes       Blood Loss  Mother: Lucia Gregory #[id number]     Start of Mother's Information      Delivery Blood Loss   Intrapartum & Postpartum: 25 0045 - 25 1318    Delivery Admission: 25 1742 - 25 1318         Intrapartum & Postpartum  Delivery Admission    None                  End of Mother's Information  Mother: Lucia Gregory #[id number]                Delivery Providers    Delivering clinician: Janette Mendoza APRN - CNM     Provider Role     Obstetrician    Angelica Khan, RN Primary Nurse    Shahana Becerra, RN Primary [place] Nurse     NICU Nurse     Neonatologist     Anesthesiologist     Nurse Anesthetist     Nurse Practitioner    Janette Mendoza APRN - CNM Midwife     Nursery Nurse     Respiratory Therapist     Scrub Tech     Assistant Surgeon              [place] Assessment    Living Status: Living        Skin Color:   Heart Rate:   Reflex Irritability:   Muscle Tone:   Respiratory Effort:   Total:            1 Minute:         5 Minute:                                        Apgars Assigned By: VANCE BECERRA              Resuscitation    Method: Stimulation, Bulb Suction             [place] Measurements                   2025   Lucia Gregory   29 y.o.   3009/01      Review the Delivery Report for details.      40w0d      Labor Events:  NAN Gregory [[id number]]      Events of Labor     labor?: No   steroids?: None  Cervical ripening date/time:       Rupture date/time:     Rupture type: AROM, Intact  Fluid color: Clear  Labor complications: None                      EBL:   Date 25 0701 - 25 0700 25 0701 - 25 0700   Shift 3875-5766 2458-2096 24 Hour Total 6317-3493 8978-6274 24 Hour Total   INTAKE   I.V.    1095.6  1095.6   IV Piggyback    8225.9  8225.9   Shift Total    9321.5  9321.5   OUTPUT   Urine    200  200   Shift Total    200  200   NET    9121.5  9121.5      Delivery type: Vaginal, Spontaneous  Anesthesia: Epidural  [place] Sex: female  [place] Weight: [unfilled] [facility]     Resuscitation:   NAN Gregory [[id number]]      Delivery Resuscitation    Method: Stimulation, Bulb Suction                Apgar:     NAN Gregory [[id number]]      Apgars

## 2025-01-23 NOTE — DISCHARGE INSTRUCTIONS
first few times in case you tire quickly. Do not push yourself. This is not about getting fit fast. This is allowing your body to have some movement which will aid in healing. Fresh air and sunshine are refreshing for both you and your baby.  Avoid heavy lifting, strenuous exercise, and excessive stair climbing. Try and limit use of stairs to 2 times daily for the first week home.   If you delivered by  section, take your time. Give yourself some melany! You should be up and moving multiple times per day, this will help you to feel better faster. However, be mindful and do not push yourself. If you have a day that you feel very uncomfortable, you likely did too much the day before. Rest and recognize your body is not ready for that level of activity yet. You will get there soon.  A good rule to follow is that you should not lift anything heavier than your baby in the care seat for the first 2 weeks. Moms with toddlers at home have children climb up to you on the couch to snuggle. If you do not have help at home post  and have multiple children to care for, please reach out to us.  Driving is individual. 7-14 days is a good rule of thumb. The first time you drive please have someone with you that can take over if you suddenly feel you cannot continue. Do not drive if you are taking narcotics for pain relief.  Shower as often as you like. You can even take a bath. An Epsom salt bath may help you feel better after delivery. For  deliveries, keep the water level below your incision. There should be nothing placed in the vagina until after your postpartum checkup. This means no tampons, douching, or intercourse (sex). Make your follow-up appointment for about six weeks after delivery.   Check with obstetrician before starting or resuming an exercise program.       Uterine changes/bleeding/perineal care     Your uterus will still be enlarged after delivery. Some women can feel their uterus as a firm  is painful  Temperature of 100.4F or above  Increasing pain that is not relieved by medication  Bloody or pus-like drainage from  incision  Bleeding stays heavy despite rest, saturating a pad in a hour or less  Passing many clots or passing clots larger than an egg (some clots are normal if you have been sitting or lying down for long periods of time)  Foul-smelling bleeding  Severe headache that is not relieved by a snack, nap, or medications. Visual disturbances like fireworks in your vision.  Red streaks or increased swelling of legs, painful red streaks on your breast.  Painful urination, constipation and increased pain or swelling or discharge with your incision.  If you feel extremely anxious or overwhelmed.  If you have thoughts of harming yourself and/or your baby.    Pain Management:     Take Acetaminophen (Tylenol) or Ibuprofen (Advil, Motrin), as directed for pain.   Use a warm Sitz bath 3 times daily to relieve episiotomy or hemorrhoidal discomfort.   For hemorrhoidal discomfort, use Tucks and Anusol cream as needed and directed.  Heating pad to  incision as needed.       Follow-Up Care:     Appointment with MD: [unfilled]  Telephone number: [phone]    Signed By: CHERYLE Ivy CNM                                                                                                   Date: 2025 Time: 7:09 PM

## 2025-01-23 NOTE — PROGRESS NOTES
0740 Bedside and Verbal shift change report given to estuardo RN (oncoming nurse) by SEJAL Hu RN (offgoing nurse). Report included the following information Nurse Handoff Report, MAR, Recent Results, and Event Log.    0820 Janettehaydee Mendoza CNM in to speak with patient. Discussing plan of care. If not in good labor by the next cytotec dose, consider Pitocin.     0835 Straight cathed for 150 ml concentrated urine. Then to right side    1000 Pt complaining of increased pain with contractions. PCEA dose    1045 Pt feeling much better     1232 L Kelly CNM in to evaluate patient. SVE by her 10/100/0. To start pushing    1237 Instruction given and starting to push    1245  of LFI    1255 Straight cathed for 5 ml of conc urine.   Total 8 hour urine output 200. Fluid bolus initiated  1445 Laly care done. Epidural catheter removed. Black tip intact. Site clear    1515 TRANSFER - OUT REPORT:    Verbal report given to TON Ramirez RN on Lucia Gregory  being transferred to Room 332 for routine progression of patient care       Report consisted of patient's Situation, Background, Assessment and   Recommendations(SBAR).     Information from the following report(s) Nurse Handoff Report, MAR, Recent Results, and Event Log was reviewed with the receiving nurse.           Lines:   Peripheral IV 25 Left Hand (Active)   Site Assessment Clean, dry & intact 25 1540   Line Status Infusing 25 1540   Line Care Connections checked and tightened 25 1540   Phlebitis Assessment No symptoms 25 1540   Infiltration Assessment 0 25 1540   Dressing Status Clean, dry & intact 25 1540   Dressing Type Transparent 25 1540        Opportunity for questions and clarification was provided.      Patient transported with:  Registered Nurse

## 2025-01-23 NOTE — LACTATION NOTE
This note was copied from a baby's chart.  . Mother states that she nursed her first baby for 4 years with a good supply. Mother states that her second baby had multiple tongue ties that needed to be released and her supply dropped significantly. She was able to nurse and supplement with donor milk for 1 year. Mother had baby latched during the consult. Helped mother with positioning at the breast to get a deeper latch. Educated mother about feeding cues, feeding on demand, offering both breasts at every feeding, and feeding at least every 3 hours. Baby is LGA. First blood sugar is 62.     Feeding Plan:  Mother will keep baby skin to skin as often as possible, feed on demand, 8-12x/day , respond to feeding cues, obtain latch, listen for audible swallowing, be aware of signs of oxytocin release/ cramping,thirst,sleepiness while breastfeeding, offer both breasts,and will not limit feedings.  Mother agrees to utilize breast massage while nursing to facilitate lactogenesis.

## 2025-01-24 VITALS
SYSTOLIC BLOOD PRESSURE: 128 MMHG | HEART RATE: 98 BPM | WEIGHT: 239 LBS | RESPIRATION RATE: 16 BRPM | BODY MASS INDEX: 45.12 KG/M2 | DIASTOLIC BLOOD PRESSURE: 88 MMHG | HEIGHT: 61 IN | TEMPERATURE: 97.7 F | OXYGEN SATURATION: 98 %

## 2025-01-24 PROCEDURE — APPNB15 APP NON BILLABLE TIME 0-15 MINS: Performed by: ADVANCED PRACTICE MIDWIFE

## 2025-01-24 PROCEDURE — 6370000000 HC RX 637 (ALT 250 FOR IP): Performed by: ADVANCED PRACTICE MIDWIFE

## 2025-01-24 PROCEDURE — 6370000000 HC RX 637 (ALT 250 FOR IP)

## 2025-01-24 RX ADMIN — IBUPROFEN 800 MG: 400 TABLET, FILM COATED ORAL at 13:02

## 2025-01-24 RX ADMIN — ACETAMINOPHEN 1000 MG: 500 TABLET ORAL at 05:08

## 2025-01-24 RX ADMIN — IBUPROFEN 800 MG: 400 TABLET, FILM COATED ORAL at 05:08

## 2025-01-24 RX ADMIN — METFORMIN HYDROCHLORIDE 500 MG: 500 TABLET, EXTENDED RELEASE ORAL at 08:52

## 2025-01-24 RX ADMIN — AMOXICILLIN 500 MG: 500 CAPSULE ORAL at 08:52

## 2025-01-24 RX ADMIN — ACETAMINOPHEN 1000 MG: 500 TABLET ORAL at 13:02

## 2025-01-24 RX ADMIN — DOCUSATE SODIUM 100 MG: 100 CAPSULE, LIQUID FILLED ORAL at 08:52

## 2025-01-24 RX ADMIN — FERROUS SULFATE TAB 325 MG (65 MG ELEMENTAL FE) 325 MG: 325 (65 FE) TAB at 08:52

## 2025-01-24 ASSESSMENT — PAIN DESCRIPTION - ORIENTATION: ORIENTATION: LOWER

## 2025-01-24 ASSESSMENT — PAIN SCALES - GENERAL
PAINLEVEL_OUTOF10: 3
PAINLEVEL_OUTOF10: 6

## 2025-01-24 ASSESSMENT — PAIN DESCRIPTION - DESCRIPTORS
DESCRIPTORS: SORE
DESCRIPTORS: SORE

## 2025-01-24 ASSESSMENT — PAIN DESCRIPTION - LOCATION
LOCATION: ABDOMEN;PERINEUM
LOCATION: PERINEUM

## 2025-01-24 NOTE — LACTATION NOTE
This note was copied from a baby's chart.  Mom nursing at the time of my visit; deep latch noted with rhythmic sucking and swallows noted. Mom expresses confidence in breastfeeding and declines need for assistance. She plans to go home today. Weight loss 4.8%  Breasts may become engorged when milk \"comes in\".  How milk is made / normal phases of milk production, supply and demand discussed.  Taught care of engorged breasts - frequent breastfeeding encouraged and breast massage ac. Then nurse the baby (or pump minimally for comfort). Apply cold compresses ac and/or pc x 15 minutes a few times a day for swelling or discomfort if necessary.  May need to do this care for a couple of days.Discussed prevention and treatment of mastitis.

## 2025-01-24 NOTE — PROGRESS NOTES
Postpartum  note  Obstetrics Postpartum Progress Note  2025    Events  No Events  Desires Early DC if infant is also able to DC home     Subjective  Pain: controlled with current medications  Lochia: Scant  PO's: taking regular diet, tolerating well  Voiding: voiding well  Ambulating: yes  Feeding: breast    Vitals  /87   Pulse 72   Temp 98.1 °F (36.7 °C) (Oral)   Resp 16   Ht 1.549 m (5' 1\")   Wt 108.4 kg (239 lb)   LMP  (LMP Unknown)   SpO2 98%   BMI 45.16 kg/m²   Temp (24hrs), Av.5 °F (36.9 °C), Min:98.1 °F (36.7 °C), Max:98.7 °F (37.1 °C)      Physical Exam [unfilled]  General:alert and oriented times 3, patient without distress  Pulm: Lungs clear to Auscultation Bilat, unlabored breathing  Fundus: Firm, Midline at U/-1, appropriately tender.  Perineum:  Intact (25 1540 : Destiny Ramirez, RN) (per RN documentation)  Breasts soft, NT  Extremities: Lower extremities are negative cords or tenderness  Edema: trace to 1+ bilateral pedal edema  Skin: warm dry        Data  @LABRCNTIP(wbc:3,hgb:3,hct:3,plt:3,creatinine:3,ast:3,alt:3)@  Immunization History   Administered Date(s) Administered    DTaP, INFANRIX, (age 6w-6y), IM, 0.5mL 1994, 1995, 1995, 1996, 2000    HPV Quadrivalent (Gardasil) 2009, 10/14/2009, 2011    Hep B, ENGERIX-B, RECOMBIVAX-HB, (age Birth - 19y), IM, 0.5mL 1994, 1994, 1995    Hib PRP-T, ACTHIB (age 2m-5y, Adlt Risk), HIBERIX (age 6w-4y, Adlt Risk), IM, 0.5mL 1994, 1995, 1996    Influenza Virus Vaccine 10/14/2009, 2017, 2018    Influenza, FLUARIX, FLULAVAL, FLUZONE (age 6 mo+) and AFLURIA, (age 3 y+), Quadv PF, 0.5mL 2017, 2018    MMR, PRIORIX, M-M-R II, (age 12m+), SC, 0.5mL 10/24/1995, 2000, 2019    PPD Test 10/24/1995, 2000, 2011    Poliovirus, IPOL, (age 6w+), SC/IM, 0.5mL 1994, 1995, 1996, 2000    TDaP,

## 2025-01-24 NOTE — DISCHARGE SUMMARY
Obstetrical Discharge Summary     Name: Lucia Gregory MRN: [id number]  SSN: [id number]    YOB: 1995  Age: 29 y.o.  Sex: female      Admit Date: 2025    Discharge Date: 25    Admitting Physician: Claudia Lawson MD     Attending Physician:  Janette Mendoza APRN - CNM     Discharge Provider:  CHERYLE Ivy CNM     Admission Diagnoses: Encounter for induction of labor [Z34.90]    Discharge Diagnoses:   Information for the patient's :  NAN Gregory [[id number]]   @[id number]@     Additional Diagnoses:  No components found for: \"OBEXTABORH\", \"OBEXTABSCRN\", \"OBEXTRUBELLA\", \"OBEXTGRBS\"    Hospital Course:   The patient was admitted 25 with a cervical exam of  / / .    She received antibiotics received during labor?:     Type of anesthesia received: Epidural    29 y.o.  female at 40w0d   Date/Time of Birth: @BABYSUPPRESS(admitdttm)@   Delivery Type: Vaginal, Spontaneous  Delivering clinician: Janette Mendoza  Fetal weight: 4.23 kg (9 lb 5.2 oz)  APGARs of  and  at 1 and 5 minutes, respectively.  Complications: None    [unfilled]   The patient's postpartum course was uncomplicated.  On the day of discharge, the patient was ambulating, voiding spontaneously, tolerating oral intake, and her pain was satisfactorily controlled.  Therefore she was deemed stable for discharge.       Disposition at Discharge: Home or self care    Code Status at Discharge: Full Code     Discharged Condition: Stable    Patient Instructions:   Current Discharge Medication List        CONTINUE these medications which have NOT CHANGED    Details   amoxicillin (AMOXIL) 500 MG capsule Take 1 capsule by mouth 2 times daily for 10 days  Qty: 20 capsule, Refills: 0    Associated Diagnoses: Streptococcal sore throat      metFORMIN (GLUCOPHAGE-XR) 500 MG extended release tablet Take 1 tablet by mouth in the morning and at bedtime  Qty: 180 tablet, Refills: 1    Associated Diagnoses: Pregnancy,

## 2025-01-24 NOTE — PROGRESS NOTES
Postpartum  note  Obstetrics Postpartum Progress Note  2025    Events  No Events  Desires Early DC if infant is also DC     Subjective  Pain: controlled with current medications  Lochia: Scant  PO's: taking regular diet, tolerating well  Voiding: voiding well  Ambulating: yes  Feeding: breast    Vitals  /88   Pulse 89   Temp 97.5 °F (36.4 °C) (Oral)   Resp 18   Ht 1.549 m (5' 1\")   Wt 108.4 kg (239 lb)   LMP  (LMP Unknown)   SpO2 98%   BMI 45.16 kg/m²   Temp (24hrs), Av.3 °F (36.8 °C), Min:97.5 °F (36.4 °C), Max:98.7 °F (37.1 °C)      Physical Exam [unfilled]  General:alert and oriented times 3, patient without distress  Pulm: Lungs clear to Auscultation Bilat, unlabored breathing  Fundus: Firm, Midline at U/-2, appropriately tender.  Perineum:  Intact (25 0508 : Yael Enrique, RN) (per RN documentation)  Breasts soft, NT  Extremities: Lower extremities are negative cords or tenderness  Edema: trace to 1+ bilateral pedal edema  Skin: warm dry        Data  @LABRCNTIP(wbc:3,hgb:3,hct:3,plt:3,creatinine:3,ast:3,alt:3)@  Immunization History   Administered Date(s) Administered    DTaP, INFANRIX, (age 6w-6y), IM, 0.5mL 1994, 1995, 1995, 1996, 2000    HPV Quadrivalent (Gardasil) 2009, 10/14/2009, 2011    Hep B, ENGERIX-B, RECOMBIVAX-HB, (age Birth - 19y), IM, 0.5mL 1994, 1994, 1995    Hib PRP-T, ACTHIB (age 2m-5y, Adlt Risk), HIBERIX (age 6w-4y, Adlt Risk), IM, 0.5mL 1994, 1995, 1996    Influenza Virus Vaccine 10/14/2009, 2017, 2018    Influenza, FLUARIX, FLULAVAL, FLUZONE (age 6 mo+) and AFLURIA, (age 3 y+), Quadv PF, 0.5mL 2017, 2018    MMR, PRIORIX, M-M-R II, (age 12m+), SC, 0.5mL 10/24/1995, 2000, 2019    PPD Test 10/24/1995, 2000, 2011    Poliovirus, IPOL, (age 6w+), SC/IM, 0.5mL 1994, 1995, 1996, 2000    TDaP, ADACEL (age

## 2025-01-24 NOTE — PROGRESS NOTES
Patient declines all delivery meds for baby: vitamin K, hepatitis B, and erythromycin. Patient is also declining to sign refusal forms at this time. Dr. Pradhan, DEMARIO Rogers and manager Selma Corbett notified.

## 2025-02-05 DIAGNOSIS — Z3A.34 34 WEEKS GESTATION OF PREGNANCY: ICD-10-CM

## 2025-02-05 DIAGNOSIS — Z34.90 PREGNANCY, UNSPECIFIED GESTATIONAL AGE: ICD-10-CM

## 2025-02-05 RX ORDER — METFORMIN HYDROCHLORIDE 500 MG/1
500 TABLET, EXTENDED RELEASE ORAL 2 TIMES DAILY
Qty: 180 TABLET | Refills: 0 | Status: SHIPPED | OUTPATIENT
Start: 2025-02-05 | End: 2025-08-04

## 2025-02-07 ENCOUNTER — OFFICE VISIT (OUTPATIENT)
Age: 30
End: 2025-02-07

## 2025-02-07 VITALS
HEART RATE: 113 BPM | DIASTOLIC BLOOD PRESSURE: 72 MMHG | SYSTOLIC BLOOD PRESSURE: 122 MMHG | WEIGHT: 215 LBS | BODY MASS INDEX: 40.62 KG/M2 | RESPIRATION RATE: 16 BRPM | OXYGEN SATURATION: 97 % | TEMPERATURE: 97.9 F

## 2025-02-07 DIAGNOSIS — H65.02 ACUTE SEROUS OTITIS MEDIA OF LEFT EAR, RECURRENCE NOT SPECIFIED: Primary | ICD-10-CM

## 2025-02-07 NOTE — PATIENT INSTRUCTIONS
Recommend flonase daily for several days and Afrin up to 3 days consecutively.  May also try sinus rinses.  Avoid decongestants without speaking to OB and/or pharmacist as it can dry up milk.  Return to clinic if ear pain or fever develops.  Go to ER if severe headaches, fever, vomiting or any significant or concerning symptoms.

## 2025-02-07 NOTE — PROGRESS NOTES
Patient Name: Lucia Gregory   YOB: 1995   Patient Status: Established patient,   Chief Complaint: Ear Pain (Left ear ringing, feels a little blocked x 3+ days)      ____________________________________________________________________________________________    External Records Reviewed: None    Limitation to History: None    Outside Historian: None    SUBJECTIVE/OBJECTIVE:  Lucia Gregory is a 29 y.o. female presents with complaint of left ear ringing and feeling full with mild popping without pain.  She reports some left sided sinus congestion / fullness.  Symptoms began 3 day(s) ago and show no change since onset.  The patient denies fever  or runny nose. Patient is currently breastfeeding so hasn't tried any medications.  She denies history of ear infections.  No other acute symptoms reported at this time.          PAST MEDICAL HISTORY:   Medical: Pt  has a past medical history of ADHD (attention deficit hyperactivity disorder), Anemia (2/17/2022), Anemia (02/17/2022), Anxiety, Anxiety, Borderline personality disorder (HCC), Borderline personality disorder (HCC), Breast disorder, Chronic cystitis (02/17/2022), Cystitis (02/17/2022), Degeneration of intervertebral disc of lumbar region (2/17/2022), Depression, Esophageal reflux (02/17/2022), History of removal of left breast implant (09/2020), History of removal of right breast implant (09/2020), breast implants, bilateral (11/30/2017), Otitis media (02/17/2022), PCOS (polycystic ovarian syndrome), Polycystic disease, ovaries, Postpartum depression, Trauma, and Unable to get pregnant, female (12/10/2020).  Surgical: Pt  has a past surgical history that includes Breast surgery (07/2017); Breast surgery (2017); and removal intact breast implant (09/2020).  Family: Pt family history includes Anxiety Disorder in her mother; Bipolar Disorder in her mother; Brain Cancer in her maternal grandmother; Breast Cancer in her maternal aunt; Cancer in her

## 2025-03-06 ENCOUNTER — POSTPARTUM VISIT (OUTPATIENT)
Age: 30
End: 2025-03-06
Payer: COMMERCIAL

## 2025-03-06 VITALS
RESPIRATION RATE: 14 BRPM | BODY MASS INDEX: 41.72 KG/M2 | TEMPERATURE: 98.8 F | WEIGHT: 221 LBS | HEART RATE: 82 BPM | OXYGEN SATURATION: 96 % | SYSTOLIC BLOOD PRESSURE: 111 MMHG | DIASTOLIC BLOOD PRESSURE: 81 MMHG | HEIGHT: 61 IN

## 2025-03-06 DIAGNOSIS — Z30.430 ENCOUNTER FOR IUD INSERTION: Primary | ICD-10-CM

## 2025-03-06 DIAGNOSIS — Z12.4 ROUTINE CERVICAL SMEAR: ICD-10-CM

## 2025-03-06 LAB
HCG, PREGNANCY, URINE, POC: NEGATIVE
VALID INTERNAL CONTROL, POC: YES

## 2025-03-06 PROCEDURE — 0503F POSTPARTUM CARE VISIT: CPT | Performed by: ADVANCED PRACTICE MIDWIFE

## 2025-03-06 PROCEDURE — 81025 URINE PREGNANCY TEST: CPT | Performed by: ADVANCED PRACTICE MIDWIFE

## 2025-03-06 PROCEDURE — 58300 INSERT INTRAUTERINE DEVICE: CPT | Performed by: ADVANCED PRACTICE MIDWIFE

## 2025-03-06 NOTE — PROGRESS NOTES
Lucia Gregory is a 29 y.o. female presents for a problem visit.    Chief Complaint   Patient presents with    Postpartum Care       Problems: none           1. Have you been to the ER, urgent care clinic, or hospitalized since your last visit? No    2. Have you seen or consulted any other health care providers outside of the [facility] since your last visit? No    Device number see MAR    Chart reviewed for the following:   Dianelys LÓPEZ RN, have reviewed the History, Physical and updated the Allergic reactions for Lucia Gregory     TIME OUT performed immediately prior to start of procedure:   Dianelys LÓPEZ RN, have performed the following reviews on Lucia Gregory prior to the start of the procedure:            * Patient was identified by name and date of birth   * Agreement on procedure being performed was verified  * Risks and Benefits explained to the patient  * Procedure site verified and marked as necessary  * Patient was positioned for comfort  * Consent was signed and verified     Time: 1030    Date of procedure: 3/6/2025    Procedure performed by:  CHERYLE BECKFORD - LEONILA       Provider assisted by: ANTHONY Mcclure RN     Patient assisted by: self    How tolerated by patient: tolerated the procedure well with no complications    Post Procedural Pain Scale: 0 - No Hurt    Comments: none    Examination chaperoned by Dianelys Mcclure RN.

## 2025-03-06 NOTE — PROGRESS NOTES
Postpartum Visit    Lucia Gregory is a 29 y.o.  presenting for her postpartum visit.  She delivered on 1/22/25 by Janette Claudio and delivery was uncomplicated.  She has been doing well since discharged from the hospital with a normal postpartum course.    Bleeding - stopped  Perineal/Incisional pain - none  Mood - feels good overall   Feeding Breast feeding well    Contraception - desires IUD today     Past Medical History:   Diagnosis Date    ADHD (attention deficit hyperactivity disorder)     Anemia 2/17/2022    Anemia 02/17/2022    Anxiety     Anxiety     Borderline personality disorder (HCC)     Borderline personality disorder (HCC)     Breast disorder     Chronic cystitis 02/17/2022    Cystitis 02/17/2022    Degeneration of intervertebral disc of lumbar region 2/17/2022    Depression     Esophageal reflux 02/17/2022    History of removal of left breast implant 09/2020    History of removal of right breast implant 09/2020    Hx of breast implants, bilateral 11/30/2017    Removed due to implant reaction    Otitis media 02/17/2022    PCOS (polycystic ovarian syndrome)     Polycystic disease, ovaries     Postpartum depression     Trauma     in the past    Unable to get pregnant, female 12/10/2020       Past Surgical History:   Procedure Laterality Date    BREAST SURGERY  07/2017    BREAST SURGERY  2017    Augmentation    REMOVAL INTACT BREAST IMPLANT  09/2020    bilateral breast implant removal       Family History   Problem Relation Age of Onset    Cancer Mother         skin,thyroid,ovarian    Migraines Mother     Depression Mother     Anxiety Disorder Mother     Bipolar Disorder Mother     Mental Illness Mother     Heart Disease Father     Hypertension Father     Other Father         prediabetes    Diabetes Father         Pre-diabetic    No Known Problems Sister     Other Brother         esbergers    Intellectual Disability Brother         Autism    Breast Cancer Maternal Aunt     Colon Cancer Paternal Aunt

## 2025-03-06 NOTE — PROGRESS NOTES
Lucia Gregory is a 29 y.o. female returns for a routine post-partum follow-up visit     Chief Complaint   Patient presents with    Postpartum Care       Postpartum Depression: Low Risk  (2025)    [place]  Depression Scale     Last EPDS Total Score: 2     Last EPDS Self Harm Result: Never         Type of delivery: normal spontaneous vaginal delivery  Date of Delivery: 2025  Breastfeeding: yes  Bleeding Resolved: yes  Birth Control: unsure. Would like to discuss.  Last Pap:  unsatisfactory        Problems: denies    1. Have you been to the ER, urgent care clinic, or hospitalized since your last visit? No    2. Have you seen or consulted any other health care providers outside of the [facility] since your last visit? No     Dianelys Mcclure RN.

## 2025-03-12 LAB
CYTOLOGIST CVX/VAG CYTO: ABNORMAL
CYTOLOGY CVX/VAG DOC CYTO: ABNORMAL
CYTOLOGY CVX/VAG DOC THIN PREP: ABNORMAL
DX ICD CODE: ABNORMAL
DX ICD CODE: ABNORMAL
HPV GENOTYPE REFLEX: ABNORMAL
HPV I/H RISK 4 DNA CVX QL PROBE+SIG AMP: NEGATIVE
OTHER STN SPEC: ABNORMAL
PATHOLOGIST CVX/VAG CYTO: ABNORMAL
RECOM F/U CVX/VAG CYTO: ABNORMAL
SERVICE CMNT-IMP: ABNORMAL
STAT OF ADQ CVX/VAG CYTO-IMP: ABNORMAL

## 2025-03-25 ENCOUNTER — LACTATION ENCOUNTER (OUTPATIENT)
Facility: HOSPITAL | Age: 30
End: 2025-03-25

## 2025-03-25 NOTE — LACTATION NOTE
This note was copied from a baby's chart.  Asked to consult 2 month old baby admitted with weight that is less than birth weight. Mom said baby has been latching and nursing well. Had frenulectomy 1 month ago and she thought feeding was going better. Mom said she has been doing some pumping and collecting 2-3 ounces.     Mom said baby is feeding constantly.  She said she is hearing the baby swallow at the breast. Baby has been taking some fortified breast milk via bottle. I talked to mom about baby expending energy when she is constantly at the breast. I recommended that she breast feed and then give the baby the bottle of fortified milk. She should not let the baby stay at the breast between feedings. Baby should be able to sleep between feedings.

## 2025-03-26 ENCOUNTER — LACTATION ENCOUNTER (OUTPATIENT)
Facility: HOSPITAL | Age: 30
End: 2025-03-26

## 2025-03-26 NOTE — LACTATION NOTE
This note was copied from a baby's chart.  Failure to thrive baby consult with mother. Mother states that baby is taking fortified breast milk well at 85 ml every three hours. Mother states that she was able to nurse the baby once today successfully. Educated mother to maintain milk supply to consistently pump every 2 to 3 hours for 15 to 20 minutes. Recommend a slow flow nipple and a wide based nipple that is more breast like. Mother will continue to feed baby goal of 85 ml of fortified breast milk every 3 hours.

## 2025-04-02 ENCOUNTER — PATIENT MESSAGE (OUTPATIENT)
Age: 30
End: 2025-04-02

## 2025-04-02 ENCOUNTER — OFFICE VISIT (OUTPATIENT)
Age: 30
End: 2025-04-02
Payer: COMMERCIAL

## 2025-04-02 VITALS
BODY MASS INDEX: 42.67 KG/M2 | DIASTOLIC BLOOD PRESSURE: 84 MMHG | HEART RATE: 76 BPM | WEIGHT: 226 LBS | HEIGHT: 61 IN | SYSTOLIC BLOOD PRESSURE: 122 MMHG | OXYGEN SATURATION: 98 %

## 2025-04-02 DIAGNOSIS — R73.03 PREDIABETES: Primary | ICD-10-CM

## 2025-04-02 DIAGNOSIS — E66.813 CLASS 3 SEVERE OBESITY DUE TO EXCESS CALORIES WITH SERIOUS COMORBIDITY AND BODY MASS INDEX (BMI) OF 40.0 TO 44.9 IN ADULT: ICD-10-CM

## 2025-04-02 PROCEDURE — 99214 OFFICE O/P EST MOD 30 MIN: CPT | Performed by: CLINICAL NURSE SPECIALIST

## 2025-04-02 RX ORDER — METFORMIN HYDROCHLORIDE 500 MG/1
500 TABLET, EXTENDED RELEASE ORAL 2 TIMES DAILY
Qty: 180 TABLET | Refills: 1 | Status: SHIPPED | OUTPATIENT
Start: 2025-04-02 | End: 2025-09-29

## 2025-04-02 NOTE — PROGRESS NOTES
Lucia Gregory (:  1995) is a 29 y.o. female,Established patient, here for evaluation of the following chief complaint(s):  Follow-up (Pt states overall doing well. Pt states she is follow up after pregnancy. )         Assessment & Plan  Prediabetes    We will restart dulaglutide.  May consider trial discontinuation of metformin.  She will continue with lifestyle efforts.    Orders:    dulaglutide (TRULICITY) 0.75 MG/0.5ML SOAJ SC injection; Inject 0.5 mLs into the skin every 7 days    Class 3 severe obesity due to excess calories with serious comorbidity and body mass index (BMI) of 40.0 to 44.9 in adult    Anticipate that GLP-1 will help with weight loss, she will continue with her routine exercise and dietary efforts.    Orders:    metFORMIN (GLUCOPHAGE-XR) 500 MG extended release tablet; Take 1 tablet by mouth in the morning and at bedtime      Return in about 4 months (around 2025).       Subjective   Lucia presents for a follow-up visit.  States that she is generally doing well.  Recently delivered a healthy, baby girl.  Longstanding history of prediabetes, has taken metformin for a couple of years.  Was previously started on dulaglutide however she became pregnant so never had the opportunity to take this.  Interesting in resuming this.  She is mindful of what she eats, and works out multiple days per week.  Admits that she enjoys going to the gym, but it is disheartening to not see any weight loss.        Review of Systems   Respiratory:  Negative for shortness of breath.    Cardiovascular:  Negative for chest pain.   Neurological:  Negative for dizziness and headaches.        Past Medical History:   Diagnosis Date    ADHD (attention deficit hyperactivity disorder)     Anemia 2022    Anemia 2022    Anxiety     Anxiety     Borderline personality disorder (HCC)     Borderline personality disorder (HCC)     Breast disorder     Chronic cystitis 2022    Cystitis 2022

## 2025-04-02 NOTE — PROGRESS NOTES
Chief Complaint   Patient presents with    Follow-up     Pt states overall doing well. Pt states she is follow up after pregnancy.      \"Have you been to the ER, urgent care clinic since your last visit?  Hospitalized since your last visit?\"    NO    “Have you seen or consulted any other health care providers outside our system since your last visit?”    NO

## 2025-04-07 ASSESSMENT — ENCOUNTER SYMPTOMS: SHORTNESS OF BREATH: 0

## 2025-04-14 ENCOUNTER — PATIENT MESSAGE (OUTPATIENT)
Age: 30
End: 2025-04-14

## 2025-04-15 DIAGNOSIS — R73.03 PREDIABETES: ICD-10-CM

## 2025-04-16 DIAGNOSIS — E66.813 CLASS 3 SEVERE OBESITY DUE TO EXCESS CALORIES WITHOUT SERIOUS COMORBIDITY WITH BODY MASS INDEX (BMI) OF 40.0 TO 44.9 IN ADULT: Primary | ICD-10-CM

## 2025-04-16 DIAGNOSIS — E28.2 PCOS (POLYCYSTIC OVARIAN SYNDROME): ICD-10-CM

## 2025-04-17 ENCOUNTER — TELEPHONE (OUTPATIENT)
Age: 30
End: 2025-04-17

## 2025-04-17 NOTE — TELEPHONE ENCOUNTER
Patient called back in regards to medical weight loss referral states she completed orientation asked for new patient paperwork to complete, sent paperwork via email

## 2025-05-08 ENCOUNTER — TELEPHONE (OUTPATIENT)
Age: 30
End: 2025-05-08

## 2025-05-08 NOTE — TELEPHONE ENCOUNTER
Patient has been sent the link for our pre-recorded [facility] Medical Weight Loss Orientation. Patient is required to register, view the recording, call insurance to verify benefits, then send an email to the  to schedule a consultation.

## 2025-06-02 DIAGNOSIS — F41.8 DEPRESSION WITH ANXIETY: Primary | ICD-10-CM

## 2025-06-02 RX ORDER — SERTRALINE HYDROCHLORIDE 25 MG/1
25 TABLET, FILM COATED ORAL DAILY
Qty: 30 TABLET | Refills: 1 | Status: SHIPPED | OUTPATIENT
Start: 2025-06-02

## 2025-06-19 DIAGNOSIS — E66.813 CLASS 3 SEVERE OBESITY DUE TO EXCESS CALORIES WITH SERIOUS COMORBIDITY AND BODY MASS INDEX (BMI) OF 40.0 TO 44.9 IN ADULT (HCC): ICD-10-CM

## 2025-06-19 RX ORDER — METFORMIN HYDROCHLORIDE 500 MG/1
500 TABLET, EXTENDED RELEASE ORAL 2 TIMES DAILY
Qty: 180 TABLET | Refills: 1 | Status: SHIPPED | OUTPATIENT
Start: 2025-06-19 | End: 2025-12-16

## 2025-06-19 NOTE — TELEPHONE ENCOUNTER
Last appt 4/2/2025      Next Apt:     Future Appointments   Date Time Provider Department Center   7/1/2025  9:00 AM Yuli Ryan APRN - CNP [facility] DEP

## 2025-07-08 ENCOUNTER — TELEMEDICINE (OUTPATIENT)
Age: 30
End: 2025-07-08

## 2025-07-08 DIAGNOSIS — E66.813 CLASS 3 SEVERE OBESITY DUE TO EXCESS CALORIES WITH SERIOUS COMORBIDITY AND BODY MASS INDEX (BMI) OF 40.0 TO 44.9 IN ADULT (HCC): Primary | ICD-10-CM

## 2025-07-08 DIAGNOSIS — E28.2 PCOS (POLYCYSTIC OVARIAN SYNDROME): ICD-10-CM

## 2025-07-08 DIAGNOSIS — F41.9 ANXIETY: ICD-10-CM

## 2025-07-08 RX ORDER — METFORMIN HYDROCHLORIDE 500 MG/1
500 TABLET, EXTENDED RELEASE ORAL 2 TIMES DAILY
Qty: 360 TABLET | Refills: 0 | Status: SHIPPED | OUTPATIENT
Start: 2025-07-08 | End: 2026-01-04

## 2025-07-08 ASSESSMENT — ENCOUNTER SYMPTOMS
GASTROINTESTINAL NEGATIVE: 1
RESPIRATORY NEGATIVE: 1

## 2025-07-08 NOTE — PROGRESS NOTES
Subjective    Lucia Gregory is a 29 y.o. female who presents today for the following:  No chief complaint on file.      History of Present Illness  The patient presents via virtual visit for weight loss, anxiety, and medication management.    She has been experiencing stomach discomfort due to her metformin medication. Despite this, she continues to take it daily in the morning. She recently refilled her prescription in August 2025 but is uncertain about her new insurance coverage for metformin. She has not yet collected the refill.    She has lost approximately 7 pounds, which she attributes to her regular gym workouts. She is preparing to participate in a half marathon in November 2025 and currently runs about 10 miles per week. She considered using phentermine but decided against it due to its appetite-suppressing effects.    She was prescribed Zoloft by her midwife for anxiety, which she reports as effective. However, she has not been consistently taking the medication. She experienced a week of heightened anxiety, during which she took Zoloft for 3 days. She now takes it intermittently.          PMH/PSH/Allergies/Social History/medication list and most recent studies reviewed with patient.     reports that she has never smoked. She has never used smokeless tobacco.    reports that she does not currently use alcohol.        Past Medical History:   Diagnosis Date    ADHD (attention deficit hyperactivity disorder)     Anemia 2/17/2022    Anemia 02/17/2022    Anxiety     Anxiety     Borderline personality disorder (HCC)     Borderline personality disorder (HCC)     Breast disorder     Chronic cystitis 02/17/2022    Cystitis 02/17/2022    Degeneration of intervertebral disc of lumbar region 2/17/2022    Depression     Esophageal reflux 02/17/2022    History of removal of left breast implant 09/2020    History of removal of right breast implant 09/2020    Hx of breast implants, bilateral 11/30/2017    Removed due

## 2025-07-30 ENCOUNTER — TELEPHONE (OUTPATIENT)
Age: 30
End: 2025-07-30

## 2025-07-30 NOTE — TELEPHONE ENCOUNTER
Hayden Roldan decrease caffine intake and drink 70oz of water daily.   Pt states shortness of breath is not a new symptom and she is not taking any new medication  Pt will call back in 2 days with update     Pt states she drinks 1 cup of coffee in the morning- is that ok?

## 2025-07-30 NOTE — TELEPHONE ENCOUNTER
Spoke to patient again and gave recommendation per Yuli:  Would hold caffeine for now/consume decaffeinated. Can reassess in a couple of days.   Pt voiced understanding and will call to give update in 2 days

## 2025-07-30 NOTE — TELEPHONE ENCOUNTER
while driving  Pt states HR has been elevated for last 3 weeks- between 90 and 110 while sitting  Pt states she has shortness of breath but no chest pain   Please advise